# Patient Record
Sex: FEMALE | Race: WHITE | NOT HISPANIC OR LATINO | Employment: FULL TIME | URBAN - METROPOLITAN AREA
[De-identification: names, ages, dates, MRNs, and addresses within clinical notes are randomized per-mention and may not be internally consistent; named-entity substitution may affect disease eponyms.]

---

## 2019-06-10 ENCOUNTER — OFFICE VISIT (OUTPATIENT)
Dept: FAMILY MEDICINE | Facility: OTHER | Age: 49
End: 2019-06-10

## 2019-06-10 ENCOUNTER — ANCILLARY PROCEDURE (OUTPATIENT)
Dept: GENERAL RADIOLOGY | Facility: OTHER | Age: 49
End: 2019-06-10
Attending: PHYSICIAN ASSISTANT

## 2019-06-10 ENCOUNTER — TELEPHONE (OUTPATIENT)
Dept: FAMILY MEDICINE | Facility: OTHER | Age: 49
End: 2019-06-10

## 2019-06-10 VITALS
OXYGEN SATURATION: 97 % | TEMPERATURE: 98.3 F | SYSTOLIC BLOOD PRESSURE: 126 MMHG | RESPIRATION RATE: 16 BRPM | HEART RATE: 90 BPM | DIASTOLIC BLOOD PRESSURE: 88 MMHG | WEIGHT: 221 LBS | BODY MASS INDEX: 36.38 KG/M2

## 2019-06-10 DIAGNOSIS — Z90.710 S/P LAPAROSCOPIC HYSTERECTOMY: ICD-10-CM

## 2019-06-10 DIAGNOSIS — M54.50 ACUTE BILATERAL LOW BACK PAIN WITHOUT SCIATICA: ICD-10-CM

## 2019-06-10 DIAGNOSIS — K92.1 TARRY STOOL: ICD-10-CM

## 2019-06-10 DIAGNOSIS — M79.661 PAIN OF RIGHT LOWER LEG: Primary | ICD-10-CM

## 2019-06-10 DIAGNOSIS — R35.0 URINARY FREQUENCY: ICD-10-CM

## 2019-06-10 DIAGNOSIS — E66.01 MORBID OBESITY (H): ICD-10-CM

## 2019-06-10 DIAGNOSIS — R21 FACIAL RASH: ICD-10-CM

## 2019-06-10 DIAGNOSIS — R10.9 ABDOMINAL CRAMPING: ICD-10-CM

## 2019-06-10 DIAGNOSIS — N18.30 CKD (CHRONIC KIDNEY DISEASE) STAGE 3, GFR 30-59 ML/MIN (H): ICD-10-CM

## 2019-06-10 LAB
ALBUMIN UR-MCNC: NEGATIVE MG/DL
APPEARANCE UR: CLEAR
BASOPHILS # BLD AUTO: 0 10E9/L (ref 0–0.2)
BASOPHILS NFR BLD AUTO: 0.3 %
BILIRUB UR QL STRIP: NEGATIVE
COLOR UR AUTO: YELLOW
DIFFERENTIAL METHOD BLD: NORMAL
EOSINOPHIL # BLD AUTO: 0.2 10E9/L (ref 0–0.7)
EOSINOPHIL NFR BLD AUTO: 1.7 %
ERYTHROCYTE [DISTWIDTH] IN BLOOD BY AUTOMATED COUNT: 14.4 % (ref 10–15)
GLUCOSE UR STRIP-MCNC: NEGATIVE MG/DL
HCT VFR BLD AUTO: 40.7 % (ref 35–47)
HEMOCCULT STL QL: NORMAL
HGB BLD-MCNC: 13.1 G/DL (ref 11.7–15.7)
HGB UR QL STRIP: ABNORMAL
KETONES UR STRIP-MCNC: NEGATIVE MG/DL
LEUKOCYTE ESTERASE UR QL STRIP: NEGATIVE
LYMPHOCYTES # BLD AUTO: 2.5 10E9/L (ref 0.8–5.3)
LYMPHOCYTES NFR BLD AUTO: 25.8 %
MCH RBC QN AUTO: 28.2 PG (ref 26.5–33)
MCHC RBC AUTO-ENTMCNC: 32.2 G/DL (ref 31.5–36.5)
MCV RBC AUTO: 88 FL (ref 78–100)
MONOCYTES # BLD AUTO: 0.7 10E9/L (ref 0–1.3)
MONOCYTES NFR BLD AUTO: 6.9 %
NEUTROPHILS # BLD AUTO: 6.3 10E9/L (ref 1.6–8.3)
NEUTROPHILS NFR BLD AUTO: 65.3 %
NITRATE UR QL: NEGATIVE
PH UR STRIP: 5.5 PH (ref 5–7)
PLATELET # BLD AUTO: 415 10E9/L (ref 150–450)
RBC # BLD AUTO: 4.64 10E12/L (ref 3.8–5.2)
SOURCE: ABNORMAL
SP GR UR STRIP: 1.01 (ref 1–1.03)
UROBILINOGEN UR STRIP-ACNC: 0.2 EU/DL (ref 0.2–1)
WBC # BLD AUTO: 9.6 10E9/L (ref 4–11)

## 2019-06-10 PROCEDURE — 82607 VITAMIN B-12: CPT | Performed by: PHYSICIAN ASSISTANT

## 2019-06-10 PROCEDURE — 36415 COLL VENOUS BLD VENIPUNCTURE: CPT | Performed by: PHYSICIAN ASSISTANT

## 2019-06-10 PROCEDURE — 82728 ASSAY OF FERRITIN: CPT | Performed by: PHYSICIAN ASSISTANT

## 2019-06-10 PROCEDURE — 83550 IRON BINDING TEST: CPT | Performed by: PHYSICIAN ASSISTANT

## 2019-06-10 PROCEDURE — 83540 ASSAY OF IRON: CPT | Performed by: PHYSICIAN ASSISTANT

## 2019-06-10 PROCEDURE — 84443 ASSAY THYROID STIM HORMONE: CPT | Performed by: PHYSICIAN ASSISTANT

## 2019-06-10 PROCEDURE — 82272 OCCULT BLD FECES 1-3 TESTS: CPT | Performed by: PHYSICIAN ASSISTANT

## 2019-06-10 PROCEDURE — 74019 RADEX ABDOMEN 2 VIEWS: CPT

## 2019-06-10 PROCEDURE — 99214 OFFICE O/P EST MOD 30 MIN: CPT | Performed by: PHYSICIAN ASSISTANT

## 2019-06-10 PROCEDURE — 80053 COMPREHEN METABOLIC PANEL: CPT | Performed by: PHYSICIAN ASSISTANT

## 2019-06-10 PROCEDURE — 82746 ASSAY OF FOLIC ACID SERUM: CPT | Performed by: PHYSICIAN ASSISTANT

## 2019-06-10 PROCEDURE — 85025 COMPLETE CBC W/AUTO DIFF WBC: CPT | Performed by: PHYSICIAN ASSISTANT

## 2019-06-10 PROCEDURE — 81001 URINALYSIS AUTO W/SCOPE: CPT | Performed by: PHYSICIAN ASSISTANT

## 2019-06-10 PROCEDURE — 72100 X-RAY EXAM L-S SPINE 2/3 VWS: CPT

## 2019-06-10 PROCEDURE — 83735 ASSAY OF MAGNESIUM: CPT | Performed by: PHYSICIAN ASSISTANT

## 2019-06-10 RX ORDER — OMEPRAZOLE 10 MG/1
40 CAPSULE, DELAYED RELEASE ORAL EVERY MORNING
COMMUNITY
End: 2022-12-21

## 2019-06-10 ASSESSMENT — ENCOUNTER SYMPTOMS
DYSURIA: 0
NUMBNESS: 0
MYALGIAS: 1
HEMATOCHEZIA: 1
CHEST TIGHTNESS: 0
FATIGUE: 0
FLANK PAIN: 0
DIFFICULTY URINATING: 0
EYE REDNESS: 0
ABDOMINAL PAIN: 1
COUGH: 0
FREQUENCY: 1
ABDOMINAL DISTENTION: 1
SHORTNESS OF BREATH: 0
CONSTIPATION: 0
PALPITATIONS: 0
BACK PAIN: 1
RECTAL PAIN: 0
DIARRHEA: 0
HEMATURIA: 0
WHEEZING: 0
BRUISES/BLEEDS EASILY: 0
EYE DISCHARGE: 0
DIAPHORESIS: 0
NAUSEA: 0
PARESTHESIAS: 0
FEVER: 0
CHILLS: 0
SORE THROAT: 0
VOMITING: 0
JOINT SWELLING: 0
EYE PAIN: 0
SINUS PAIN: 0
ADENOPATHY: 0
HEADACHES: 1
HEARTBURN: 1

## 2019-06-10 NOTE — TELEPHONE ENCOUNTER
"Please triage appt.  Next 5 appointments (look out 90 days)    Will 10, 2019  6:30 PM CDT  Office Visit with Sulelen Millard PA-C  [facility] [address] [place] [id number] [phone]        \"burning stinging in upper part of rt leg occasionally in left leg - hurts to touch \"  "

## 2019-06-10 NOTE — PROGRESS NOTES
Subjective     Sailaja Mcgee is a 49 year old female who presents to clinic today for the following health issues:    HPI   Rash  Onset: started the same time as leg pain/abdominal pain    Description:   Location: face  Character: round, blotchy, raised, flakey, painful a little bit   Itching (Pruritis): no     Progression of Symptoms:  worsening and intermittent    Accompanying Signs & Symptoms:  Fever: no   Chills: yes  Body aches or joint pain: YES- right leg, mainly right thigh, when standing pelvic pain feels like its ripping.   Sore throat symptoms: YES- a little bit with a lump on the back of her tongue that started a little while ago(today/this afternoon)  Recent cold symptoms: no     History:   Previous similar rash: no     Precipitating factors:   Exposure to similar rash: no   New exposures: None   Recent travel: no     Alleviating factors:  none    Therapies Tried and outcome: noxema to wash, no chemicals Neutrogena(all natural)-nothing seems to help    Right leg pain:  2 months ago with injury pain started 1.5 months ago.   Washing machine fell onto her leg. She was lifting on bottom end, top part fell off and landed on her right leg.  Turned black and blue and eventually went away. Pain was not present until after the bruising went away.   Tender to touch  When standing at work can feel like it is burning (cold burning sensation), if standing too long will go across the groin and into the left leg. Initially only pain with standing but now getting more symptoms with sitting.   Pain getting worse.   Sometimes has some pain into the whole lower back pain.    Has recently had increased urinary frequency.  Sometimes has some small amount of leakage without knowing.  Started around the same time. Didn't have problems prior to the leg pain.  Has urinary frequency in past.    Had tarry stools for about a month, some red in it says she has hemorrhoids.  The last 2 bowel movements have been more normal and  "soft. Has a history of acid reflux but well treated if she takes her daily PPI medication, only rarely has recurrence of symptoms.  Denies tobacco use ( notes a short period of use 30 years ago but nothing regularly).   She'll have stomach contraction, couple times per day, has been going on for about the same amount of time as her other symptoms. Feels like \"air bubbles popping inside\".     From triage note 06/10:  She has multiple concerns that she believes are all related to some leg pain.   Everything started around the same time, about two months ago.   1. She has a pain in the front of her right thigh that is worst with standing. It either feels like it's on fire or it's numb and cold. There is no discoloration of the skin. It feels this way with pressure. Sitting makes it better, but it can still be present. Occasionally it will radiate to her groin and into her other leg and back.   2. She has generalized stomach concerns that can happen at the same time as the leg pain. \"It feels like a contraction\". There is no real \"pain\".   3. Her last two BM's have been \"closer to normal\", but prior to that her BM's have been \"like tar, but not black\". She has noticed blood in her stools, but has a history of hemorrhoids.   4. Her face has broken out in a \"rash\". It's red \"like acne, but not acne\".   Suellen was informed about patients concerns. She will see patient at 6 pm. Patient was advised that an extensive work up will be done and she can be given number for financial counseling to help.     Patient Active Problem List   Diagnosis     GERD (gastroesophageal reflux disease)     CARDIOVASCULAR SCREENING; LDL GOAL LESS THAN 160     Dysmenorrhea     Menorrhagia     Donor of kidney for transplant     Anemia     S/P laparoscopic hysterectomy     Obesity (BMI 35.0-39.9) with comorbidity (H)     Past Surgical History:   Procedure Laterality Date     C LAP,DONOR KIDNEY REMOV,LIVING  4/2/2009    Left laparoscopic " donor nephrectomy.  Donating to brother. [facility].     CYSTOSCOPY  10/24/2011    Procedure:CYSTOSCOPY; Surgeon:NAYELY MADRIGAL; Location:[facility] OR     HC BRAIN MAPPING, 1ST HOUR MD ATTENDANCE  2001    ablation of seizure focus     HC REDUCTION OF LARGE BREAST  2001     HYSTERECTOMY VAGINAL  10/24/2011    Procedure:HYSTERECTOMY VAGINAL; vaginal hysterectomy and cystoscopy; Surgeon:NAYELY MADRIGAL; Location:[facility] OR     HYSTEROSCOPY,ABLATION ENDOMETRIUM  2001       Social History     Tobacco Use     Smoking status: Never Smoker     Smokeless tobacco: Never Used     Tobacco comment: no smokers in the household   Substance Use Topics     Alcohol use: No     Family History   Problem Relation Age of Onset     Cancer Paternal Grandfather         colon cancer     Diabetes Maternal Grandmother      Cerebrovascular Disease Maternal Grandmother      Arthritis Maternal Grandmother      Arthritis Mother      Cardiovascular Maternal Grandfather         heart attack x2     Heart Disease Maternal Grandfather         heart attack x2     Gastrointestinal Disease Paternal Grandmother         liver failure     Genitourinary Problems Brother         kidney failure     Cancer Father         lung cancer diagnosed 7/11 due to chemical exposure war     Cancer Maternal Aunt      Asthma No family hx of      C.A.D. No family hx of      Hypertension No family hx of      Breast Cancer No family hx of      Cancer - colorectal No family hx of      Prostate Cancer No family hx of      Alzheimer Disease No family hx of      Blood Disease No family hx of      Circulatory No family hx of      Eye Disorder No family hx of      Lipids No family hx of      Musculoskeletal Disorder No family hx of      Neurologic Disorder No family hx of      Respiratory No family hx of      Thyroid Disease No family hx of          Current Outpatient Medications   Medication Sig Dispense Refill     omeprazole (PRILOSEC) 10 MG DR capsule Take 20 mg by mouth 2 times daily        Allergies   Allergen Reactions     Aspirin Rash     Rash on face     BP Readings from Last 3 Encounters:   06/10/19 126/88   04/12/16 104/80   04/21/15 126/76    Wt Readings from Last 3 Encounters:   06/10/19 100.2 kg (221 lb)   04/12/16 95.9 kg (211 lb 6.4 oz)   04/21/15 98.9 kg (218 lb)           Reviewed and updated as needed this visit by Provider  Tobacco  Allergies  Meds  Problems  Med Hx  Surg Hx  Fam Hx         Review of Systems   Constitutional: Negative for chills, diaphoresis, fatigue and fever.   HENT: Negative for congestion, ear pain, sinus pain and sore throat.    Eyes: Negative for pain, discharge, redness and visual disturbance.   Respiratory: Negative for cough, chest tightness, shortness of breath and wheezing.    Cardiovascular: Negative for chest pain, palpitations and peripheral edema.   Gastrointestinal: Positive for abdominal distention, abdominal pain, heartburn and hematochezia. Negative for constipation, diarrhea, nausea, rectal pain and vomiting.   Endocrine: Negative for cold intolerance and heat intolerance.   Genitourinary: Positive for frequency and urgency. Negative for decreased urine volume, difficulty urinating, dysuria, flank pain, hematuria, pelvic pain, vaginal bleeding, vaginal discharge and vaginal pain.   Musculoskeletal: Positive for back pain and myalgias. Negative for gait problem and joint swelling.   Skin: Positive for rash.   Neurological: Positive for headaches. Negative for syncope, numbness and paresthesias.   Hematological: Negative for adenopathy. Does not bruise/bleed easily.          Objective    /88   Pulse 90   Temp 98.3  F (36.8  C) (Temporal)   Resp 16   Wt 100.2 kg (221 lb)   LMP 09/19/2011   SpO2 97%   BMI 36.38 kg/m    Body mass index is 36.38 kg/m .  Physical Exam   GENERAL: healthy, alert and no distress  EYES: Eyes grossly normal to inspection, PERRL and conjunctivae and sclerae normal  HENT: ear canals and TM's normal, nose and  mouth without ulcers or lesions  NECK: no adenopathy, no asymmetry, masses, or scars and thyroid normal to palpation  RESP: lungs clear to auscultation - no rales, rhonchi or wheezes  CV: regular rate and rhythm, normal S1 S2, no S3 or S4, no murmur, click or rub, no peripheral edema and peripheral pulses strong  ABDOMEN: soft, mild distension noted of abdomen and noted abdominal muscle contraction RLQ region, mildly tender to palpation over area of contraction, no hepatosplenomegaly, no masses and bowel sounds normal  MS: no gross musculoskeletal defects noted, no edema, full active ROM of bilateral hips, lumbar spine, knees.  Tenderness to palpation along the right lumbar paraspinal region, right SI joint, mildly tender over the left SI joint, non-tender over the lumbar spinous processes.  Tenderness to light touch over the anterior right thigh, mild tenderness over the lateral and medial right thigh, non-tender over the posterior right thigh. Non-tender to palpation of the entire left thigh.  Non-tender to palpation over right knee and right calf muscles.  5/5 lower extremity strength.   SKIN: face mild erythema over cheeks with scattered papules, no contusions or erythema of the skin.   NEURO: Normal strength and tone, mentation intact and speech normal, gait normal, CN II-XII grossly intact.   PSYCH: mentation appears normal, affect normal/bright    Diagnostic Test Results:  Results for orders placed or performed in visit on 06/10/19 (from the past 24 hour(s))   CBC with platelets differential   Result Value Ref Range    WBC 9.6 4.0 - 11.0 10e9/L    RBC Count 4.64 3.8 - 5.2 10e12/L    Hemoglobin 13.1 11.7 - 15.7 g/dL    Hematocrit 40.7 35.0 - 47.0 %    MCV 88 78 - 100 fl    MCH 28.2 26.5 - 33.0 pg    MCHC 32.2 31.5 - 36.5 g/dL    RDW 14.4 10.0 - 15.0 %    Platelet Count 415 150 - 450 10e9/L    % Neutrophils 65.3 %    % Lymphocytes 25.8 %    % Monocytes 6.9 %    % Eosinophils 1.7 %    % Basophils 0.3 %     Absolute Neutrophil 6.3 1.6 - 8.3 10e9/L    Absolute Lymphocytes 2.5 0.8 - 5.3 10e9/L    Absolute Monocytes 0.7 0.0 - 1.3 10e9/L    Absolute Eosinophils 0.2 0.0 - 0.7 10e9/L    Absolute Basophils 0.0 0.0 - 0.2 10e9/L    Diff Method Automated Method    Comprehensive metabolic panel   Result Value Ref Range    Sodium 140 133 - 144 mmol/L    Potassium 3.7 3.4 - 5.3 mmol/L    Chloride 108 94 - 109 mmol/L    Carbon Dioxide 26 20 - 32 mmol/L    Anion Gap 6 3 - 14 mmol/L    Glucose 89 70 - 99 mg/dL    Urea Nitrogen 13 7 - 30 mg/dL    Creatinine 1.17 (H) 0.52 - 1.04 mg/dL    GFR Estimate 55 (L) >60 mL/min/[1.73_m2]    GFR Estimate If Black 64 >60 mL/min/[1.73_m2]    Calcium 8.5 8.5 - 10.1 mg/dL    Bilirubin Total 0.2 0.2 - 1.3 mg/dL    Albumin 3.3 (L) 3.4 - 5.0 g/dL    Protein Total 7.8 6.8 - 8.8 g/dL    Alkaline Phosphatase 94 40 - 150 U/L    ALT 17 0 - 50 U/L    AST 12 0 - 45 U/L   Iron and iron binding capacity   Result Value Ref Range    Iron 31 (L) 35 - 180 ug/dL    Iron Binding Cap 272 240 - 430 ug/dL    Iron Saturation Index 11 (L) 15 - 46 %   Magnesium   Result Value Ref Range    Magnesium 2.2 1.6 - 2.3 mg/dL   Ferritin   Result Value Ref Range    Ferritin 119 8 - 252 ng/mL   TSH with free T4 reflex   Result Value Ref Range    TSH 2.98 0.40 - 4.00 mU/L   Occult blood stool   Result Value Ref Range    Occult Blood Canceled, Test credited NEG^Negative   *UA reflex to Microscopic   Result Value Ref Range    Color Urine Yellow     Appearance Urine Clear     Glucose Urine Negative NEG^Negative mg/dL    Bilirubin Urine Negative NEG^Negative    Ketones Urine Negative NEG^Negative mg/dL    Specific Gravity Urine 1.015 1.003 - 1.035    Blood Urine Trace (A) NEG^Negative    pH Urine 5.5 5.0 - 7.0 pH    Protein Albumin Urine Negative NEG^Negative mg/dL    Urobilinogen Urine 0.2 0.2 - 1.0 EU/dL    Nitrite Urine Negative NEG^Negative    Leukocyte Esterase Urine Negative NEG^Negative    Source Unspecified Urine    Urine  Microscopic   Result Value Ref Range    WBC Urine 0 - 5 OTO5^0 - 5 /HPF    RBC Urine O - 2 OTO2^O - 2 /HPF    Squamous Epithelial /LPF Urine Few FEW^Few /LPF    Bacteria Urine Few (A) NEG^Negative /HPF     LUMBAR SPINE TWO-THREE  VIEWS  6/10/2019 6:49 PM      HISTORY: Acute bilateral low back pain without sciatica     COMPARISON: None.     FINDINGS: There is normal osseous alignment.  No fractures are  identified.  There is loss of disc space height at L5-S1. No  spondylolysis or spondylolisthesis is identified.                                                                      IMPRESSION: Degenerative disc disease at L5-S1.     PEDRO ORANTES MD      ABDOMEN TWO VIEWS 6/10/2019 6:50 PM      HISTORY: Urinary frequency; Abdominal cramping; Tarry stool     COMPARISON: None.                                                                      IMPRESSION: Surgical clips project in the medial left upper and mid  abdomen. Prominent stool in the ascending and proximal transverse  colon. Nonobstructive bowel gas pattern. No evidence for  pneumoperitoneum.     CHICHO RAHMAN MD        RIGHT LEG VENOUS ULTRASOUND 6/11/2019 11:34 AM     HISTORY:  Crushing type injury two months ago, hematoma developed,  persistent pain of right lower leg.     TECHNIQUE: Venous Doppler ultrasound with color flow and spectral  Doppler with waveform analysis performed. Compression and augmentation  performed.     COMPARISON:  None.     FINDINGS: There is no evidence for deep vein thrombus in the right  common femoral, superficial femoral, popliteal, or visualized portions  of posterior tibial veins. No greater saphenous vein thrombus. No  sonographic abnormality along the mid lateral thigh, negative for  fluid collection.                                                                      IMPRESSION:  Negative for deep vein thrombus right leg.     CHICHO RAHMAN MD    Assessment & Plan       ICD-10-CM    1. Pain of right lower leg M79.661 CBC  with platelets differential     Comprehensive metabolic panel     Iron and iron binding capacity     Magnesium     Ferritin     Folate     Vitamin B12     US Lower Extremity Venous Duplex Right     TSH with free T4 reflex   2. Acute bilateral low back pain without sciatica M54.5 CBC with platelets differential     Comprehensive metabolic panel     Iron and iron binding capacity     Magnesium     Ferritin     Folate     Vitamin B12     XR Lumbar Spine 2/3 Views     TSH with free T4 reflex   3. Abdominal cramping R10.9 CBC with platelets differential     Occult blood stool     XR Abdomen 2 Views     Comprehensive metabolic panel     Iron and iron binding capacity     Magnesium     Ferritin     Folate     Vitamin B12     TSH with free T4 reflex   4. Tarry stool K92.1 CBC with platelets differential     Occult blood stool     XR Abdomen 2 Views     Comprehensive metabolic panel     Iron and iron binding capacity     Magnesium     Ferritin     Folate     Vitamin B12   5. Urinary frequency R35.0 CBC with platelets differential     XR Abdomen 2 Views     Comprehensive metabolic panel     *UA reflex to Microscopic     Urine Microscopic   6. Facial rash R21 Comprehensive metabolic panel     TSH with free T4 reflex   7. Morbid obesity (H) E66.01    8. S/P laparoscopic hysterectomy Z90.710         Uncertain if her symptoms are related or not.  Differential diagnosis includes but not limited to hematoma of right leg, deep venous thrombosis, bilateral back pain with radiculopathy, cauda equina, UTI, upper or lower GI bleed, GERD, gastritis, abdominal perforation, constipation, hemorrhoids, ulcer disease, colon cancer, rosacea, acne, lupus malar rash, anemia.      In regards to the leg feel it is important to obtain x-ray of the lumbar spine, evaluate for degenerative disc disease, Recommended ultrasound to rule out deep venous thrombosis and any hematoma collection, no concerns about arterial blockage due to normal pulses and  coloration of the lower leg.  Consider MRI lumbar spine as this could be neuropathic pain causing symptoms but due to trauma ruling out local concerns to the thigh.     Because of patient noting dark tarry stools which are now more normal recommended obtain fecal occult lab, anemia work-up.  She is to stay on acid reducing medication. Obtained x-ray to make sure no air fluid levels or free air or signs of overt constipation.  Uncertain the cause of the abdominal cramping witnessed on exam, obtained metabolic panel.  Consider endoscopy/colonoscopy to further evaluate.     Urinary frequency low suspicion for cauda equina as she is not overtly losing control but has some leakage, could be constipation related with the abdomen.  Ruled out infection with UA.  Consider MRI to rule out back cause.     The rash on face suspicious for rosacea,  notes worse with sunlight, may be developing due to stress with these other symptoms and some acne type treatments are helping.      06/11/19: Her lab work thus far shows no immediate concerns that would explain all her symptoms.  Her hemoglobin is normal but her iron levels are low. Her creatinine is slightly elevated but has been in past and is not much changed from the past.  Her urine is negative for infection - trace blood noted on the dip but microscopic RBC are 0-2 so low concern. At this time would recommend going forward with MRI of the lumbar spine to see if this is the cause of the symptoms as the ultrasound showed no deep venous thrombosis or signs of fluid collection and her pain does not appear to be with the hip itself as she has more tenderness to touch not much change with movement. Recommend starting Miralax to try and clear out the bowels as stool was noted in colon.  Consider referral to GI and/or endoscopy to further assess if GI symptoms persist.  Recommend avoidance of any medications which could hurt kidneys and recommend starting iron supplement.  She  still needs to complete fecal occult to determine if blood is in stool. Her folate and vitamin B12 results were normal.     Advised ED if severe symptoms develop.     Return in about 1 week (around 6/17/2019) for If not improving, sooner if worse or new concerns.     Options for treatment and follow-up care were reviewed with the patient and/or guardian. Patient and/or guardian engaged in the decision making process and verbalized understanding of the options discussed and agreed with the final plan.     Suellen Millard PA-C  [facility]

## 2019-06-10 NOTE — TELEPHONE ENCOUNTER
"I spoke with patient.   She has multiple concerns that she believes are all related to some leg pain.   Everything started around the same time, about two months ago.   1. She has a pain in the front of her right thigh that is worst with standing. It either feels like it's on fire or it's numb and cold. There is no discoloration of the skin. It feels this way with pressure. Sitting makes it better, but it can still be present. Occasionally it will radiate to her groin and into her other leg and back.   2. She has generalized stomach concerns that can happen at the same time as the leg pain. \"It feels like a contraction\". There is no real \"pain\".   3. Her last two BM's have been \"closer to normal\", but prior to that her BM's have been \"like tar, but not black\". She has noticed blood in her stools, but has a history of hemorrhoids.   4. Her face has broken out in a \"rash\". It's red \"like acne, but not acne\".   Suellen was informed about patients concerns. She will see patient at 6 pm. Patient was advised that an extensive work up will be done and she can be given number for financial counseling to help.     Next 5 appointments (look out 90 days)    Will 10, 2019  6:30 PM CDT  Office Visit with Suellen Millard PA-C  [facility] [address]  [facility] [id number] [phone]        Thelma Darnell, RN, BSN    "

## 2019-06-11 ENCOUNTER — ANCILLARY PROCEDURE (OUTPATIENT)
Dept: ULTRASOUND IMAGING | Facility: OTHER | Age: 49
End: 2019-06-11
Attending: PHYSICIAN ASSISTANT

## 2019-06-11 DIAGNOSIS — M79.661 PAIN OF RIGHT LOWER LEG: ICD-10-CM

## 2019-06-11 LAB
ALBUMIN SERPL-MCNC: 3.3 G/DL (ref 3.4–5)
ALP SERPL-CCNC: 94 U/L (ref 40–150)
ALT SERPL W P-5'-P-CCNC: 17 U/L (ref 0–50)
ANION GAP SERPL CALCULATED.3IONS-SCNC: 6 MMOL/L (ref 3–14)
AST SERPL W P-5'-P-CCNC: 12 U/L (ref 0–45)
BACTERIA #/AREA URNS HPF: ABNORMAL /HPF
BILIRUB SERPL-MCNC: 0.2 MG/DL (ref 0.2–1.3)
BUN SERPL-MCNC: 13 MG/DL (ref 7–30)
CALCIUM SERPL-MCNC: 8.5 MG/DL (ref 8.5–10.1)
CHLORIDE SERPL-SCNC: 108 MMOL/L (ref 94–109)
CO2 SERPL-SCNC: 26 MMOL/L (ref 20–32)
CREAT SERPL-MCNC: 1.17 MG/DL (ref 0.52–1.04)
FERRITIN SERPL-MCNC: 119 NG/ML (ref 8–252)
FOLATE SERPL-MCNC: 9.8 NG/ML
GFR SERPL CREATININE-BSD FRML MDRD: 55 ML/MIN/{1.73_M2}
GLUCOSE SERPL-MCNC: 89 MG/DL (ref 70–99)
IRON SATN MFR SERPL: 11 % (ref 15–46)
IRON SERPL-MCNC: 31 UG/DL (ref 35–180)
MAGNESIUM SERPL-MCNC: 2.2 MG/DL (ref 1.6–2.3)
NON-SQ EPI CELLS #/AREA URNS LPF: ABNORMAL /LPF
POTASSIUM SERPL-SCNC: 3.7 MMOL/L (ref 3.4–5.3)
PROT SERPL-MCNC: 7.8 G/DL (ref 6.8–8.8)
RBC #/AREA URNS AUTO: ABNORMAL /HPF
SODIUM SERPL-SCNC: 140 MMOL/L (ref 133–144)
TIBC SERPL-MCNC: 272 UG/DL (ref 240–430)
TSH SERPL DL<=0.005 MIU/L-ACNC: 2.98 MU/L (ref 0.4–4)
VIT B12 SERPL-MCNC: 474 PG/ML (ref 193–986)
WBC #/AREA URNS AUTO: ABNORMAL /HPF

## 2019-06-11 PROCEDURE — 93971 EXTREMITY STUDY: CPT | Mod: RT

## 2019-06-17 ENCOUNTER — TELEPHONE (OUTPATIENT)
Dept: FAMILY MEDICINE | Facility: OTHER | Age: 49
End: 2019-06-17

## 2019-06-17 NOTE — TELEPHONE ENCOUNTER
Spoke with patient.   Symptoms are not improving.   Waiting to figure out what they used for her Brain surgery so waiting to hear back to schedule MRI if able.   She needs to come in to do lab for occult stool, if positive consider endoscopy versus CT Abdomen/Pelvis.   Uncertain if there is possibly some abdominal mass that could be causing her symptoms.  She will keep us up to date and will determine next step based on results.     Suellen Millard PA-C

## 2019-06-20 ENCOUNTER — HOSPITAL ENCOUNTER (OUTPATIENT)
Dept: MRI IMAGING | Facility: CLINIC | Age: 49
Discharge: HOME | End: 2019-06-20
Attending: PHYSICIAN ASSISTANT | Admitting: PHYSICIAN ASSISTANT

## 2019-06-20 ENCOUNTER — TELEPHONE (OUTPATIENT)
Dept: FAMILY MEDICINE | Facility: OTHER | Age: 49
End: 2019-06-20

## 2019-06-20 DIAGNOSIS — R19.00 PELVIC MASS: Primary | ICD-10-CM

## 2019-06-20 PROCEDURE — 72148 MRI LUMBAR SPINE W/O DYE: CPT

## 2019-06-20 NOTE — TELEPHONE ENCOUNTER
Spoke with patient regarding results. Will get CT Abdomen/Pelvis scheduled for patient Monday and contact with time of imaging.     Suellen Millard PA-C

## 2019-06-20 NOTE — TELEPHONE ENCOUNTER
Received call from radiology     MRI lumbar spine   - Diffuse degenerative disc disease   - 17 cm cystic mass in peritoneum     Recommend MRI abdomen or CT ABD/Pelvis for further evalation     Results relayed to patient's ordering provider, SIOMARA Brito PA-C  [facility]

## 2019-06-21 ENCOUNTER — TELEPHONE (OUTPATIENT)
Dept: FAMILY MEDICINE | Facility: OTHER | Age: 49
End: 2019-06-21

## 2019-06-21 ENCOUNTER — HOSPITAL ENCOUNTER (OUTPATIENT)
Dept: CT IMAGING | Facility: CLINIC | Age: 49
Discharge: HOME | End: 2019-06-21
Attending: PHYSICIAN ASSISTANT | Admitting: PHYSICIAN ASSISTANT

## 2019-06-21 DIAGNOSIS — R19.00 PELVIC MASS: Primary | ICD-10-CM

## 2019-06-21 DIAGNOSIS — R19.00 PELVIC MASS: ICD-10-CM

## 2019-06-21 PROCEDURE — 25000128 H RX IP 250 OP 636: Performed by: PHYSICIAN ASSISTANT

## 2019-06-21 PROCEDURE — 25000125 ZZHC RX 250: Performed by: PHYSICIAN ASSISTANT

## 2019-06-21 PROCEDURE — 74177 CT ABD & PELVIS W/CONTRAST: CPT

## 2019-06-21 RX ORDER — IOPAMIDOL 755 MG/ML
500 INJECTION, SOLUTION INTRAVASCULAR ONCE
Status: COMPLETED | OUTPATIENT
Start: 2019-06-21 | End: 2019-06-21

## 2019-06-21 RX ADMIN — SODIUM CHLORIDE 60 ML: 9 INJECTION, SOLUTION INTRAVENOUS at 13:38

## 2019-06-21 RX ADMIN — IOPAMIDOL 100 ML: 755 INJECTION, SOLUTION INTRAVENOUS at 13:38

## 2019-06-21 NOTE — TELEPHONE ENCOUNTER
Called patient and she can go today to get this done.  Scheduled her at [place] for 12:30, NPO 2 hours prior.     Patient placed on 3L O2 via [place].

## 2019-06-25 ENCOUNTER — ONCOLOGY VISIT (OUTPATIENT)
Dept: ONCOLOGY | Facility: CLINIC | Age: 49
End: 2019-06-25
Attending: PHYSICIAN ASSISTANT

## 2019-06-25 VITALS
BODY MASS INDEX: 35.27 KG/M2 | TEMPERATURE: 98.2 F | WEIGHT: 219.5 LBS | OXYGEN SATURATION: 99 % | HEIGHT: 66 IN | HEART RATE: 105 BPM | DIASTOLIC BLOOD PRESSURE: 91 MMHG | SYSTOLIC BLOOD PRESSURE: 135 MMHG

## 2019-06-25 DIAGNOSIS — R19.03 RIGHT LOWER QUADRANT ABDOMINAL MASS: ICD-10-CM

## 2019-06-25 LAB
CANCER AG125 SERPL-ACNC: 282 U/ML (ref 0–30)
CEA SERPL-MCNC: 6.9 UG/L (ref 0–2.5)

## 2019-06-25 PROCEDURE — 99204 OFFICE O/P NEW MOD 45 MIN: CPT | Performed by: INTERNAL MEDICINE

## 2019-06-25 PROCEDURE — 36415 COLL VENOUS BLD VENIPUNCTURE: CPT | Performed by: INTERNAL MEDICINE

## 2019-06-25 PROCEDURE — 86304 IMMUNOASSAY TUMOR CA 125: CPT | Performed by: INTERNAL MEDICINE

## 2019-06-25 PROCEDURE — 82378 CARCINOEMBRYONIC ANTIGEN: CPT | Performed by: INTERNAL MEDICINE

## 2019-06-25 RX ORDER — MULTIPLE VITAMINS W/ MINERALS TAB 9MG-400MCG
1 TAB ORAL EVERY MORNING
COMMUNITY
End: 2019-08-06

## 2019-06-25 ASSESSMENT — MIFFLIN-ST. JEOR: SCORE: 1634.46

## 2019-06-25 ASSESSMENT — PAIN SCALES - GENERAL: PAINLEVEL: WORST PAIN (10)

## 2019-06-25 NOTE — LETTER
"    6/25/2019         RE: Sailaja Mcgee  [address] [facility] [place] [address]        Dear Colleague,    Thank you for referring your patient, Sailaja Mcgee, to the [facility]. Please see a copy of my visit note below.    Oncology Rooming Note    June 25, 2019 2:53 PM   Sailaja Mcgee is a 49 year old female who presents for:    Chief Complaint   Patient presents with     Consult     Pelvic Mass Ref: Suellen Millard     Initial Vitals: BP (!) 135/91 (BP Location: Right arm, Patient Position: Sitting, Cuff Size: Adult Regular)   Pulse 105   Temp 98.2  F (36.8  C) (Temporal)   Ht 1.664 m (5' 5.5\")   Wt 99.6 kg (219 lb 8 oz)   LMP 09/19/2011   SpO2 99%   BMI 35.97 kg/m    Estimated body mass index is 35.97 kg/m  as calculated from the following:    Height as of this encounter: 1.664 m (5' 5.5\").    Weight as of this encounter: 99.6 kg (219 lb 8 oz). Body surface area is 2.15 meters squared.  Worst Pain (10) Comment: Data Unavailable   Patient's last menstrual period was 09/19/2011.  Allergies reviewed: Yes  Medications reviewed: Yes    Medications: Medication refills not needed today.  Pharmacy name entered into [employer]: [facility] [address] [place] [address] [id number] [place] [address]    Clinical concerns: none Dr. Recinos was notified.      Nikia Weber              DATE OF VISIT: Jun 25, 2019    REASON FOR REFERRAL: Pelvic mass    CHIEF COMPLAINT:   Chief Complaint   Patient presents with     Consult     Pelvic Mass Ref: Suellen Millard       HISTORY OF PRESENT ILLNESS:   Sailaja Mcgee 49-year-old female patient who presented with right leg pain mostly in the right thigh for more than 1 month.  Feels like burning sensation that is tender to touch.  Patient also has been complaining of abdominal distention.  Subsequently she went on to have MRI of the lumbar spine on June 20, 2019.  It showed L5-S1 severe degenerative disc disease without neural impingement.  There is a very large multiloculated cystic lesion occupying " most of the pelvic peritoneum.  A CT of the abdomen was done showed large multiseptated cystic mass with thickened septae in the abdomen and pelvis measuring 27 x 15 x 19 concerning for malignancy.  There is moderate amount of ascites mild stranding in the peritoneal adipose tissue anteriorly.  Patient has been having increasing abdominal distention and occasional nausea and poor appetite.  Here today to discuss further management.    REVIEW OF SYSTEMS:   Constitutional: Negative for fever, chills, and night sweats.  Skin: negative.  Eyes: negative.  Ears/Nose/Throat: negative.  Respiratory: No shortness of breath, dyspnea on exertion, cough, or hemoptysis.  Cardiovascular: negative.  Gastrointestinal:  Genitourinary: negative.  Musculoskeletal: negative.  Neurologic: negative.  Psychiatric: negative.  Hematologic/Lymphatic/Immunologic: negative.  Endocrine: negative.    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  Donating to brother. [facility].     CYSTOSCOPY  10/24/2011    Procedure:CYSTOSCOPY; Surgeon:NAYELY MADRIGAL; Location:[facility] OR     HC BRAIN MAPPING, 1ST HOUR MD ATTENDANCE  2001    ablation of seizure focus     HC REDUCTION OF LARGE BREAST  2001     HYSTERECTOMY VAGINAL  10/24/2011    Procedure:HYSTERECTOMY VAGINAL; vaginal hysterectomy and cystoscopy; Surgeon:NAYELY MADRIGAL; Location:[facility] OR     HYSTEROSCOPY,ABLATION ENDOMETRIUM  2001       ALLERGIES:   Allergies as of 06/25/2019 - Reviewed 06/25/2019   Allergen Reaction Noted     Aspirin Rash 10/20/2011       MEDICATIONS:   Current  Outpatient Medications   Medication Sig Dispense Refill     multivitamin w/minerals (MULTI-VITAMIN) tablet Take 1 tablet by mouth daily       omeprazole (PRILOSEC) 10 MG DR capsule Take 20 mg by mouth 2 times daily          FAMILY HISTORY:   Family History   Problem Relation Age of Onset     Cancer Paternal Grandfather         colon cancer     Diabetes Maternal Grandmother      Cerebrovascular Disease Maternal Grandmother      Arthritis Maternal Grandmother      Arthritis Mother      Cardiovascular Maternal Grandfather         heart attack x2     Heart Disease Maternal Grandfather         heart attack x2     Gastrointestinal Disease Paternal Grandmother         liver failure     Genitourinary Problems Brother         kidney failure     Cancer Father         lung cancer diagnosed 7/11 due to chemical exposure war     Cancer Maternal Aunt      Asthma No family hx of      C.A.D. No family hx of      Hypertension No family hx of      Breast Cancer No family hx of      Cancer - colorectal No family hx of      Prostate Cancer No family hx of      Alzheimer Disease No family hx of      Blood Disease No family hx of      Circulatory No family hx of      Eye Disorder No family hx of      Lipids No family hx of      Musculoskeletal Disorder No family hx of      Neurologic Disorder No family hx of      Respiratory No family hx of      Thyroid Disease No family hx of         SOCIAL HISTORY:   Social History     Socioeconomic History     Marital status:      Spouse name: None     Number of children: None     Years of education: None     Highest education level: None   Occupational History     None   Social Needs     Financial resource strain: None     Food insecurity:     Worry: None     Inability: None     Transportation needs:     Medical: None     Non-medical: None   Tobacco Use     Smoking status: Never Smoker     Smokeless tobacco: Never Used     Tobacco comment: no smokers in the household   Substance and Sexual  "Activity     Alcohol use: No     Drug use: No     Sexual activity: Yes     Partners: Male   Lifestyle     Physical activity:     Days per week: None     Minutes per session: None     Stress: None   Relationships     Social connections:     Talks on phone: None     Gets together: None     Attends Confucianism service: None     Active member of club or organization: None     Attends meetings of clubs or organizations: None     Relationship status: None     Intimate partner violence:     Fear of current or ex partner: None     Emotionally abused: None     Physically abused: None     Forced sexual activity: None   Other Topics Concern     Parent/sibling w/ CABG, MI or angioplasty before 65F 55M? Not Asked   Social History Narrative     None       PHYSICAL EXAMINATION:   BP (!) 135/91 (BP Location: Right arm, Patient Position: Sitting, Cuff Size: Adult Regular)   Pulse 105   Temp 98.2  F (36.8  C) (Temporal)   Ht 1.664 m (5' 5.5\")   Wt 99.6 kg (219 lb 8 oz)   LMP 09/19/2011   SpO2 99%   BMI 35.97 kg/m     Wt Readings from Last 10 Encounters:   06/25/19 99.6 kg (219 lb 8 oz)   06/10/19 100.2 kg (221 lb)   04/12/16 95.9 kg (211 lb 6.4 oz)   04/21/15 98.9 kg (218 lb)   02/10/14 93 kg (205 lb)   01/17/14 91.6 kg (202 lb)   01/09/14 92.5 kg (204 lb)   01/05/14 93 kg (205 lb)   11/05/11 97.1 kg (214 lb)   10/24/11 98.7 kg (217 lb 9.5 oz)      ECOG performance status: 1  GENERAL APPEARANCE: Healthy, alert and in no acute distress.  HEENT: Sclerae anicteric. PERRLA. Oropharynx without ulcers, lesions, or thrush.  NECK: Supple. No asymmetry or masses.  LYMPHATICS: No palpable cervical, supraclavicular, axillary, or inguinal lymphadenopathy.  RESP: Lungs clear to auscultation bilaterally without rales, rhonchi or wheezes.  CARDIOVASCULAR: Regular rate and rhythm. Normal S1, S2; no S3 or S4. No murmur, gallop, or rub.  ABDOMEN: Abdominal distention tenderness in the suprapubic area. Bowel sounds normal. No palpable organomegaly " or masses.  MUSCULOSKELETAL: Extremities without gross deformities noted. No edema of bilateral lower extremities.  SKIN: No suspicious lesions or rashes.  NEURO: Alert and oriented x 3. Cranial nerves II-XII grossly intact.  PSYCHIATRIC: Mentation and affect appear normal.    LABORATORY RESULTS:  Hospital Outpatient Visit on 06/12/2019   Component Date Value Ref Range Status     Radiologist flags 06/20/2019 Large abdominal and pelvic cystic mass, probably an*  Final       IMAGING RESULTS:  Recent Results (from the past 744 hour(s))   XR Lumbar Spine 2/3 Views    Narrative    LUMBAR SPINE TWO-THREE  VIEWS  6/10/2019 6:49 PM     HISTORY: Acute bilateral low back pain without sciatica    COMPARISON: None.    FINDINGS: There is normal osseous alignment.  No fractures are  identified.  There is loss of disc space height at L5-S1. No  spondylolysis or spondylolisthesis is identified.      Impression    IMPRESSION: Degenerative disc disease at L5-S1.    PEDRO ORANTES MD   XR Abdomen 2 Views    Narrative    ABDOMEN TWO VIEWS 6/10/2019 6:50 PM     HISTORY: Urinary frequency; Abdominal cramping; Tarry stool    COMPARISON: None.      Impression    IMPRESSION: Surgical clips project in the medial left upper and mid  abdomen. Prominent stool in the ascending and proximal transverse  colon. Nonobstructive bowel gas pattern. No evidence for  pneumoperitoneum.    CHICHO RAHMAN MD   US Lower Extremity Venous Duplex Right    Narrative    RIGHT LEG VENOUS ULTRASOUND 6/11/2019 11:34 AM    HISTORY:  Crushing type injury two months ago, hematoma developed,  persistent pain of right lower leg.    TECHNIQUE: Venous Doppler ultrasound with color flow and spectral  Doppler with waveform analysis performed. Compression and augmentation  performed.    COMPARISON:  None.     FINDINGS: There is no evidence for deep vein thrombus in the right  common femoral, superficial femoral, popliteal, or visualized portions  of posterior tibial veins. No  greater saphenous vein thrombus. No  sonographic abnormality along the mid lateral thigh, negative for  fluid collection.      Impression    IMPRESSION:  Negative for deep vein thrombus right leg.    CHICHO RAHMAN MD   MR Lumbar Spine w/o Contrast   Result Value    Radiologist flags (Urgent)     Large abdominal and pelvic cystic mass, probably an    Narrative    MRI LUMBAR SPINE WITHOUT CONTRAST   6/20/2019 3:12 PM     HISTORY: Bilateral back pain, leg pain, tingling and numbness.    TECHNIQUE: Multiplanar, multisequence MRI of the lumbar spine without  contrast.    COMPARISON: Radiographs 6/10/2019    FINDINGS: Radiographs show 5 lumbar-type vertebral bodies. The distal  spinal cord and cauda equina appear normal.    T12-L1: Normal disc, facet joints, spinal canal and neural foramina.      L1-L2: Normal disc, facet joints, spinal canal and neural foramina.      L2-L3: Normal disc, facet joints, spinal canal and neural foramina.      L3-L4: Normal disc, facet joints, spinal canal and neural foramina.     L4-L5: Normal disc, facet joints, spinal canal and neural foramina.      L5-S1: Severe degenerative disc disease. Posterior disc bulge does not  cause any neural impingement. Normal facet joints, spinal canal and  neural foramina.    Paraspinous soft tissues: There is a very large multiloculated cystic  lesion occupying all of the peritoneum seen on the scan, at least 17  cm transverse diameter.    Bone marrow: Normal.       Impression    IMPRESSION:    1. L5-S1 severe degenerative disc disease without neural impingement.  2. There is a very large multiloculated cystic lesion occupying most  of the pelvic peritoneum. MRI or CT scan of the abdomen and pelvis is  recommended for further evaluation.    I called the report to the clinic and talked with Tae Coppola at 4:05 PM.    [Access Center: Large abdominal and pelvic cystic mass, probably an  ovarian tumor.]    This report will be copied to the  [facility] to ensure a  provider acknowledges the finding. Access Center is available Monday  through Friday 8 am-3:30 pm.       FARA LOWERY MD   CT Abdomen Pelvis w Contrast    Narrative    CT ABDOMEN AND PELVIS WITH CONTRAST  6/21/2019 1:43 PM    HISTORY: 17 cm cystic pelvic mass found on Lumbar MRI. Pelvic mass.    TECHNIQUE: Scans obtained from the diaphragm through the pelvis with  IV contrast. Radiation dose for this scan was reduced using automated  exposure control, adjustment of the mA and/or kV according to patient  size, or iterative reconstruction technique.    COMPARISON:  MRI lumbar spine dated 6/20/2019, CT abdomen dated  7/21/2008.    FINDINGS: Calcified granuloma measuring 0.4 cm is seen in the right  lower lobe medially. Visualized portions of the lung bases are  otherwise grossly clear. There is a moderate-sized hiatal hernia  containing a portion of the stomach, some fluid, and adipose tissue.  Calcified subcarinal lymph node is noted. Visualized mediastinal  contents are otherwise grossly unremarkable. There are no aggressive  osseous lesions.      The liver, gallbladder, pancreas, spleen, bilateral adrenal glands and  right kidney enhance normally. Left kidney is surgically absent.  Patient was a renal donor per history. No hydronephrosis,  nephrolithiasis, hydroureter or ureteral calculus. Urinary bladder is  unremarkable.    There is a large mostly cystic multiseptated mass with some thickening  of septae in the lower abdomen and pelvis measuring up to  approximately 27.0 x 14.5 x 18.9 cm in the transverse, AP and  craniocaudal dimensions, respectively. This is of uncertain etiology  but could be ovarian. Patient is status post hysterectomy. Possible  collapsing cyst is seen in the right adnexa, likely in the right  ovary. The left ovary is not well seen.    There is a moderate amount of ascites. There are some stranding in the  peritoneal adipose tissue anteriorly in the  abdomen. This could  represent edema versus early omental caking.    No adenopathy or free air is seen in the peritoneal cavity. Aorta is  grossly normal in appearance.    The colon is of normal caliber without pericolonic inflammatory change  to suggest diverticulitis. Appendix is not well seen. No pericecal  inflammatory change to suggest acute appendicitis. Small bowel is  grossly of normal caliber.    IMPRESSION  1. Large multiseptated cystic mass with thickened septae in the  abdomen and pelvis measures up to 27 x 15 x 19 cm. 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 A benign serocystadenoma is considered  less likely.  2. Moderate amount of ascites could represent malignant fluid or  ascites of other etiology.  3. Mild stranding in the peritoneal adipose tissues anteriorly in the  upper abdomen could represent early omental caking but could also  represent edema of other cause.  4. Moderate-sized hiatal hernia. Left nephrectomy. Evidence of chronic  granulomatous disease of the right lung.    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  Today I will check Ca125 and CEA level.  We would make arrangements for the patient to see gynecologic oncology as treatment options.  I gave the patient overview for very malignancies, we talked about different modalities in the treatment including systemic chemotherapy.  We also talked about symptom management.    The patient is ready to learn, no apparent learning barriers were identified, Diagnosis and treatment plans were explained to the patient. The patient expressed understanding of the content. The patient questions were answered to her satisfaction.    Bert Recinos MD     Time spent 45 minutes more than 50% of the time in counseling and  coordination of care including reviewing imaging studies, discussion of differential diagnosis, management of pelvic malignancies, side effects of chemotherapy, follow-up and prognosis.    Chart documentation with Dragon Voice recognition Software. Although reviewed after completion, some words and grammatical errors may remain.      Again, thank you for allowing me to participate in the care of your patient.        Sincerely,        Bert Recinos MD

## 2019-06-25 NOTE — PROGRESS NOTES
"Oncology Rooming Note    June 25, 2019 2:53 PM   Sailaja Mcgee is a 49 year old female who presents for:    Chief Complaint   Patient presents with     Consult     Pelvic Mass Ref: Suellen Millard     Initial Vitals: BP (!) 135/91 (BP Location: Right arm, Patient Position: Sitting, Cuff Size: Adult Regular)   Pulse 105   Temp 98.2  F (36.8  C) (Temporal)   Ht 1.664 m (5' 5.5\")   Wt 99.6 kg (219 lb 8 oz)   LMP 09/19/2011   SpO2 99%   BMI 35.97 kg/m   Estimated body mass index is 35.97 kg/m  as calculated from the following:    Height as of this encounter: 1.664 m (5' 5.5\").    Weight as of this encounter: 99.6 kg (219 lb 8 oz). Body surface area is 2.15 meters squared.  Worst Pain (10) Comment: Data Unavailable   Patient's last menstrual period was 09/19/2011.  Allergies reviewed: Yes  Medications reviewed: Yes    Medications: Medication refills not needed today.  Pharmacy name entered into [facility]: [facility] [address] [place] [id number] [facility] [place]    Clinical concerns: none Dr. Recinos was notified.      Nikia Weber"

## 2019-06-25 NOTE — PATIENT INSTRUCTIONS
Today:  Labs Today!    See Gyn Oncology ASAP!    Gyn Oncology Consult Date/Time:   6/28/19 at 9:10 with Dr. Soto  [facility]  [address] [address]  [phone]    Please follow up with Dr. Recinos as instructed or if instructed by Gyn Oncology.      If you have any questions or concerns please feel free to call.    If you need to reschedule please call:  Clinic or Lab Appointment - [phone]  Infusion - [phone]  Imaging - [phone]    Mingo Parrish, RN, BSN, OCN  [facility]   Oncology/Hematology Care Coordinator RN  [facility]  [phone]

## 2019-06-25 NOTE — TELEPHONE ENCOUNTER
ONCOLOGY INTAKE: Records Information      APPT INFORMATION:  Referring provider:  Dr. Bert Recinos  Referring provider s clinic: [facility]  Reason for visit/diagnosis: Pelvic Mass  Has patient been notified of appointment date and time?: Yes    RECORDS INFORMATION:  Were the records received with the referral (via [employer])? No    Has patient been seen for any external appt for this diagnosis? No    If yes, where? n/a    Has patient had any imaging or procedures outside of [facility] for this condition? No      If Yes, where? n/a    ADDITIONAL INFORMATION:  None

## 2019-06-26 NOTE — PROGRESS NOTES
GYN Oncology New Patient Consult    Referring provider:    Bert Recinos MD  [address] [place] [place] [address]   RE: Sailaja Mcgee  : 1970  ROGER: 2019      CC: Pelvic mass    HPI: Ms Sailaja Mcgee is a 49 year old year old female who presents for consultation regarding a pelvic mass. She is here today with her oldest daughter Nikkie.     She notes back and right leg pain for about 2 months. The leg pain started after she dropped some furniture on it. Had imaging because of this leg and back pain which then revealed this pelvic mass.   She does note abdominal 'hardness' and 'contractions'. Unsure how long this has been going on. Feels more bloated and like she has more abdominal distension. Is unable to eat large meals.  Bowels working OK, some constipation. Has some urinary urgency. No vaginal bleeding  Has had a vaginal hysterectomy (ovaries in situ) for endometriosis and painful periods. Also reports history of uterine ablation. Has donated her kidney to her brother (left) . No further seizures since her brain surgery.     Treatment history:   19: MRI of back for back pain  19: CT scan showing large pelvic mass    Reports she doesn't like to go to doctors, hasn't seen a physician in a long time.     Review of Systems:  Systemic:No weight changes.    Skin : No skin changes or new lesions.   Eye : No changes in vision.   Pulmonary: No cough or SOB.   Cardiovascular: No CP or palpitations  Gastrointestinal : Per HPI  Genitourinary: +frequency  Psychiatric: No depression or anxiety  Hematologic : No palpable lymph nodes.   Endocrine : + hot flashes. No heat/cold intolerance.      Neurological: No headaches, no numbness.     Past Medical History:   Diagnosis Date     Seizure (H)     corrected by surgical procedure     Solitary kidney 2009    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       Past Surgical History:   Procedure Laterality Date     C LAP,DONOR KIDNEY REMOV,LIVING  2009     "Left laparoscopic donor nephrectomy.  Donating to brother. [facility].     CYSTOSCOPY  10/24/2011    Procedure:CYSTOSCOPY; Surgeon:NAYELY MADRIGAL; Location:[facility]     HC BRAIN MAPPING, 1ST HOUR MD ATTENDANCE      ablation of seizure focus     HC REDUCTION OF LARGE BREAST       HYSTERECTOMY VAGINAL  10/24/2011    Procedure:HYSTERECTOMY VAGINAL; vaginal hysterectomy and cystoscopy; Surgeon:NAYELY MADRIGAL; Location:[facility]     HYSTEROSCOPY,ABLATION ENDOMETRIUM          OBGYN history and Health Maintenance:    Last Pap Smear: , NILM prior to hysterectomy  Last Mammogram:never  Last Colonoscopy: >15 years    Current Outpatient Medications   Medication Sig Dispense Refill     multivitamin w/minerals (MULTI-VITAMIN) tablet Take 1 tablet by mouth daily       omeprazole (PRILOSEC) 10 MG DR capsule Take 20 mg by mouth 2 times daily              Allergies   Allergen Reactions     Aspirin Rash     Rash on face        Social History:  Social History     Tobacco Use     Smoking status: Never Smoker     Smokeless tobacco: Never Used     Tobacco comment: no smokers in the household   Substance Use Topics     Alcohol use: No     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    General: Alert and oriented, no acute distress  Psych: Mood stable. Linear speech, appropriate affect  Cardiovascular: RRR, no murmors  Pulmonary: Lungs clear . Normal respiratory effort  GI: Moderate distention. " Fullness palpated above umbilicus. Given habitus, difficult to assess size and mobility of mass  Lymph: No enlarge lymph nodes in neck or groin  : Normal external genitalia. Cervix absent. Uterus absent. Adenexa not palpated in pelvis, only abdominal mass appreciated. Moderate pelvic organ prolapse (cuff, anterior and posterior)     6/25/19  Ca125: 282  CEA 6.9    6/21/19:  IMPRESSION  1. Large multiseptated cystic mass with thickened septae in the  abdomen and pelvis measures up to 27 x 15 x 19 cm. 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 A benign serocystadenoma is considered  less likely.  2. Moderate amount of ascites could represent malignant fluid or  ascites of other etiology.  3. Mild stranding in the peritoneal adipose tissues anteriorly in the  upper abdomen could represent early omental caking but could also  represent edema of other cause.  4. Moderate-sized hiatal hernia. Left nephrectomy. Evidence of chronic  granulomatous disease of the right lung.     LANG GIMENEZ MD    Assessment:    Sailaja Mcgee is a 49 year old woman with a new diagnosis of large complex pelvic mass      Plan:     1.)   Pelvic mass: Suspect ovarian tumor. Differential includes benign, malignant or borderline pathology. Given ascites and elevated tumor markers, I am concerned this is a malignancy. 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 Also could be a primary GI malignancy. No recent colonoscopy and she does have a family history of colon cancer. I am recommending surgical excision with staging vs debulking. Assess colon prior to surgery.  . We discussed the surgical goals including debulking of all visible tumor and/or tumor staging. We discussed that this will include a BSO, omentectomy, appendectomy and further tumor debulking. We discussed the possibility of small bowel and/or colon resection which could result in  a temporary or permanent ostomy. Discussed surgical risks no limited to bleeding, infection, damage to surrounding organs, need for blood transfusions and ICU stay. Discussed need for chemotherapy depending on final pathology. Consents reviewed and signed today         -CT Chest for further staging  -Colonscopy ASAP  -PAC for preop assessment  -XLap/BSO/staging versus debulking. (She has already had a hysterectomy)    2.) Genetic risk factors were assessed: final pathology pending        A total of 65 minutes was spent with the patient,60 minutes of which were spent in counseling the patient and/or treatment planning.    Ivy Soto MD    Department of Ob/Gyn and Women's Health  Division of Gynecologic Oncology  [facility]  [phone]

## 2019-06-27 NOTE — PROGRESS NOTES
DATE OF VISIT: Jun 25, 2019    REASON FOR REFERRAL: Pelvic mass    CHIEF COMPLAINT:   Chief Complaint   Patient presents with     Consult     Pelvic Mass Ref: Suellen Millard       HISTORY OF PRESENT ILLNESS:   Sailaja Mcgee 49-year-old female patient who presented with right leg pain mostly in the right thigh for more than 1 month.  Feels like burning sensation that is tender to touch.  Patient also has been complaining of abdominal distention.  Subsequently she went on to have MRI of the lumbar spine on June 20, 2019.  It showed L5-S1 severe degenerative disc disease without neural impingement.  There is a very large multiloculated cystic lesion occupying most of the pelvic peritoneum.  A CT of the abdomen was done showed large multiseptated cystic mass with thickened septae in the abdomen and pelvis measuring 27 x 15 x 19 concerning for malignancy.  There is moderate amount of ascites mild stranding in the peritoneal adipose tissue anteriorly.  Patient has been having increasing abdominal distention and occasional nausea and poor appetite.  Here today to discuss further management.    REVIEW OF SYSTEMS:   Constitutional: Negative for fever, chills, and night sweats.  Skin: negative.  Eyes: negative.  Ears/Nose/Throat: negative.  Respiratory: No shortness of breath, dyspnea on exertion, cough, or hemoptysis.  Cardiovascular: negative.  Gastrointestinal:  Genitourinary: negative.  Musculoskeletal: negative.  Neurologic: negative.  Psychiatric: negative.  Hematologic/Lymphatic/Immunologic: negative.  Endocrine: negative.    PAST MEDICAL HISTORY:   Past Medical History:   Diagnosis Date     Fever 2001    fever of unknown origin after lap tubal and ablation     PONV (postoperative nausea and vomiting)      Seizure (H) 2002    corrected by surgical procedure     Solitary kidney 4/2009    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       PAST SURGICAL HISTORY:   Past Surgical History:   Procedure Laterality Date     C LAP,DONOR KIDNEY  REMOV,LIVING  4/2/2009    Left laparoscopic donor nephrectomy.  Donating to brother. [facility] [place].     CYSTOSCOPY  10/24/2011    Procedure:CYSTOSCOPY; Surgeon:NAYELY MADRIGAL; Location:[facility]     HC BRAIN MAPPING, 1ST HOUR MD ATTENDANCE  2001    ablation of seizure focus     HC REDUCTION OF LARGE BREAST  2001     HYSTERECTOMY VAGINAL  10/24/2011    Procedure:HYSTERECTOMY VAGINAL; vaginal hysterectomy and cystoscopy; Surgeon:NAYELY MADRIGAL; Location:[facility]     HYSTEROSCOPY,ABLATION ENDOMETRIUM  2001       ALLERGIES:   Allergies as of 06/25/2019 - Reviewed 06/25/2019   Allergen Reaction Noted     Aspirin Rash 10/20/2011       MEDICATIONS:   Current Outpatient Medications   Medication Sig Dispense Refill     multivitamin w/minerals (MULTI-VITAMIN) tablet Take 1 tablet by mouth daily       omeprazole (PRILOSEC) 10 MG DR capsule Take 20 mg by mouth 2 times daily          FAMILY HISTORY:   Family History   Problem Relation Age of Onset     Cancer Paternal Grandfather         colon cancer     Diabetes Maternal Grandmother      Cerebrovascular Disease Maternal Grandmother      Arthritis Maternal Grandmother      Arthritis Mother      Cardiovascular Maternal Grandfather         heart attack x2     Heart Disease Maternal Grandfather         heart attack x2     Gastrointestinal Disease Paternal Grandmother         liver failure     Genitourinary Problems Brother         kidney failure     Cancer Father         lung cancer diagnosed 7/11 due to chemical exposure war     Cancer Maternal Aunt      Asthma No family hx of      C.A.D. No family hx of      Hypertension No family hx of      Breast Cancer No family hx of      Cancer - colorectal No family hx of      Prostate Cancer No family hx of      Alzheimer Disease No family hx of      Blood Disease No family hx of      Circulatory No family hx of      Eye Disorder No family hx of      Lipids No family hx of      Musculoskeletal Disorder No family hx of      Neurologic Disorder  "No family hx of      Respiratory No family hx of      Thyroid Disease No family hx of         SOCIAL HISTORY:   Social History     Socioeconomic History     Marital status:      Spouse name: None     Number of children: None     Years of education: None     Highest education level: None   Occupational History     None   Social Needs     Financial resource strain: None     Food insecurity:     Worry: None     Inability: None     Transportation needs:     Medical: None     Non-medical: None   Tobacco Use     Smoking status: Never Smoker     Smokeless tobacco: Never Used     Tobacco comment: no smokers in the household   Substance and Sexual Activity     Alcohol use: No     Drug use: No     Sexual activity: Yes     Partners: Male   Lifestyle     Physical activity:     Days per week: None     Minutes per session: None     Stress: None   Relationships     Social connections:     Talks on phone: None     Gets together: None     Attends Judaism service: None     Active member of club or organization: None     Attends meetings of clubs or organizations: None     Relationship status: None     Intimate partner violence:     Fear of current or ex partner: None     Emotionally abused: None     Physically abused: None     Forced sexual activity: None   Other Topics Concern     Parent/sibling w/ CABG, MI or angioplasty before 65F 55M? Not Asked   Social History Narrative     None       PHYSICAL EXAMINATION:   BP (!) 135/91 (BP Location: Right arm, Patient Position: Sitting, Cuff Size: Adult Regular)   Pulse 105   Temp 98.2  F (36.8  C) (Temporal)   Ht 1.664 m (5' 5.5\")   Wt 99.6 kg (219 lb 8 oz)   LMP 09/19/2011   SpO2 99%   BMI 35.97 kg/m    Wt Readings from Last 10 Encounters:   06/25/19 99.6 kg (219 lb 8 oz)   06/10/19 100.2 kg (221 lb)   04/12/16 95.9 kg (211 lb 6.4 oz)   04/21/15 98.9 kg (218 lb)   02/10/14 93 kg (205 lb)   01/17/14 91.6 kg (202 lb)   01/09/14 92.5 kg (204 lb)   01/05/14 93 kg (205 lb) "   11/05/11 97.1 kg (214 lb)   10/24/11 98.7 kg (217 lb 9.5 oz)      ECOG performance status: 1  GENERAL APPEARANCE: Healthy, alert and in no acute distress.  HEENT: Sclerae anicteric. PERRLA. Oropharynx without ulcers, lesions, or thrush.  NECK: Supple. No asymmetry or masses.  LYMPHATICS: No palpable cervical, supraclavicular, axillary, or inguinal lymphadenopathy.  RESP: Lungs clear to auscultation bilaterally without rales, rhonchi or wheezes.  CARDIOVASCULAR: Regular rate and rhythm. Normal S1, S2; no S3 or S4. No murmur, gallop, or rub.  ABDOMEN: Abdominal distention tenderness in the suprapubic area. Bowel sounds normal. No palpable organomegaly or masses.  MUSCULOSKELETAL: Extremities without gross deformities noted. No edema of bilateral lower extremities.  SKIN: No suspicious lesions or rashes.  NEURO: Alert and oriented x 3. Cranial nerves II-XII grossly intact.  PSYCHIATRIC: Mentation and affect appear normal.    LABORATORY RESULTS:  Hospital Outpatient Visit on 06/12/2019   Component Date Value Ref Range Status     Radiologist flags 06/20/2019 Large abdominal and pelvic cystic mass, probably an*  Final       IMAGING RESULTS:  Recent Results (from the past 744 hour(s))   XR Lumbar Spine 2/3 Views    Narrative    LUMBAR SPINE TWO-THREE  VIEWS  6/10/2019 6:49 PM     HISTORY: Acute bilateral low back pain without sciatica    COMPARISON: None.    FINDINGS: There is normal osseous alignment.  No fractures are  identified.  There is loss of disc space height at L5-S1. No  spondylolysis or spondylolisthesis is identified.      Impression    IMPRESSION: Degenerative disc disease at L5-S1.    PEDRO ORANTES MD   XR Abdomen 2 Views    Narrative    ABDOMEN TWO VIEWS 6/10/2019 6:50 PM     HISTORY: Urinary frequency; Abdominal cramping; Tarry stool    COMPARISON: None.      Impression    IMPRESSION: Surgical clips project in the medial left upper and mid  abdomen. Prominent stool in the ascending and proximal  transverse  colon. Nonobstructive bowel gas pattern. No evidence for  pneumoperitoneum.    CHICHO RAHMAN MD   US Lower Extremity Venous Duplex Right    Narrative    RIGHT LEG VENOUS ULTRASOUND 6/11/2019 11:34 AM    HISTORY:  Crushing type injury two months ago, hematoma developed,  persistent pain of right lower leg.    TECHNIQUE: Venous Doppler ultrasound with color flow and spectral  Doppler with waveform analysis performed. Compression and augmentation  performed.    COMPARISON:  None.     FINDINGS: There is no evidence for deep vein thrombus in the right  common femoral, superficial femoral, popliteal, or visualized portions  of posterior tibial veins. No greater saphenous vein thrombus. No  sonographic abnormality along the mid lateral thigh, negative for  fluid collection.      Impression    IMPRESSION:  Negative for deep vein thrombus right leg.    CHICHO RAHMAN MD   MR Lumbar Spine w/o Contrast   Result Value    Radiologist flags (Urgent)     Large abdominal and pelvic cystic mass, probably an    Narrative    MRI LUMBAR SPINE WITHOUT CONTRAST   6/20/2019 3:12 PM     HISTORY: Bilateral back pain, leg pain, tingling and numbness.    TECHNIQUE: Multiplanar, multisequence MRI of the lumbar spine without  contrast.    COMPARISON: Radiographs 6/10/2019    FINDINGS: Radiographs show 5 lumbar-type vertebral bodies. The distal  spinal cord and cauda equina appear normal.    T12-L1: Normal disc, facet joints, spinal canal and neural foramina.      L1-L2: Normal disc, facet joints, spinal canal and neural foramina.      L2-L3: Normal disc, facet joints, spinal canal and neural foramina.      L3-L4: Normal disc, facet joints, spinal canal and neural foramina.     L4-L5: Normal disc, facet joints, spinal canal and neural foramina.      L5-S1: Severe degenerative disc disease. Posterior disc bulge does not  cause any neural impingement. Normal facet joints, spinal canal and  neural foramina.    Paraspinous soft  tissues: There is a very large multiloculated cystic  lesion occupying all of the peritoneum seen on the scan, at least 17  cm transverse diameter.    Bone marrow: Normal.       Impression    IMPRESSION:    1. L5-S1 severe degenerative disc disease without neural impingement.  2. There is a very large multiloculated cystic lesion occupying most  of the pelvic peritoneum. MRI or CT scan of the abdomen and pelvis is  recommended for further evaluation.    I called the report to the clinic and talked with Tae Coppola at 4:05 PM.    [Access Center: Large abdominal and pelvic cystic mass, probably an  ovarian tumor.]    This report will be copied to the [facility] to ensure a  provider acknowledges the finding. Access Center is available Monday  through Friday 8 am-3:30 pm.       FARA LOWERY MD   CT Abdomen Pelvis w Contrast    Narrative    CT ABDOMEN AND PELVIS WITH CONTRAST  6/21/2019 1:43 PM    HISTORY: 17 cm cystic pelvic mass found on Lumbar MRI. Pelvic mass.    TECHNIQUE: Scans obtained from the diaphragm through the pelvis with  IV contrast. Radiation dose for this scan was reduced using automated  exposure control, adjustment of the mA and/or kV according to patient  size, or iterative reconstruction technique.    COMPARISON:  MRI lumbar spine dated 6/20/2019, CT abdomen dated  7/21/2008.    FINDINGS: Calcified granuloma measuring 0.4 cm is seen in the right  lower lobe medially. Visualized portions of the lung bases are  otherwise grossly clear. There is a moderate-sized hiatal hernia  containing a portion of the stomach, some fluid, and adipose tissue.  Calcified subcarinal lymph node is noted. Visualized mediastinal  contents are otherwise grossly unremarkable. There are no aggressive  osseous lesions.      The liver, gallbladder, pancreas, spleen, bilateral adrenal glands and  right kidney enhance normally. Left kidney is surgically absent.  Patient was a renal donor per history.  No hydronephrosis,  nephrolithiasis, hydroureter or ureteral calculus. Urinary bladder is  unremarkable.    There is a large mostly cystic multiseptated mass with some thickening  of septae in the lower abdomen and pelvis measuring up to  approximately 27.0 x 14.5 x 18.9 cm in the transverse, AP and  craniocaudal dimensions, respectively. This is of uncertain etiology  but could be ovarian. Patient is status post hysterectomy. Possible  collapsing cyst is seen in the right adnexa, likely in the right  ovary. The left ovary is not well seen.    There is a moderate amount of ascites. There are some stranding in the  peritoneal adipose tissue anteriorly in the abdomen. This could  represent edema versus early omental caking.    No adenopathy or free air is seen in the peritoneal cavity. Aorta is  grossly normal in appearance.    The colon is of normal caliber without pericolonic inflammatory change  to suggest diverticulitis. Appendix is not well seen. No pericecal  inflammatory change to suggest acute appendicitis. Small bowel is  grossly of normal caliber.    IMPRESSION  1. Large multiseptated cystic mass with thickened septae in the  abdomen and pelvis measures up to 27 x 15 x 19 cm. 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 A benign serocystadenoma is considered  less likely.  2. Moderate amount of ascites could represent malignant fluid or  ascites of other etiology.  3. Mild stranding in the peritoneal adipose tissues anteriorly in the  upper abdomen could represent early omental caking but could also  represent edema of other cause.  4. Moderate-sized hiatal hernia. Left nephrectomy. Evidence of chronic  granulomatous disease of the right lung.    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   Today I will check Ca125 and CEA level.  We would make arrangements for the patient to see gynecologic oncology as treatment options.  I gave the patient overview for very malignancies, we talked about different modalities in the treatment including systemic chemotherapy.  We also talked about symptom management.    The patient is ready to learn, no apparent learning barriers were identified, Diagnosis and treatment plans were explained to the patient. The patient expressed understanding of the content. The patient questions were answered to her satisfaction.    Bert Recinos MD     Time spent 45 minutes more than 50% of the time in counseling and coordination of care including reviewing imaging studies, discussion of differential diagnosis, management of pelvic malignancies, side effects of chemotherapy, follow-up and prognosis.    Chart documentation with Dragon Voice recognition Software. Although reviewed after completion, some words and grammatical errors may remain.

## 2019-06-27 NOTE — NURSING NOTE
DISCHARGE PLAN:  Next appointments: See patient instruction section  Departure Mode: Ambulatory  Accompanied by:  and daughter  5 minutes for nursing discharge (face to face time)     Sailaja Mcgee is here today for Oncology consult.  Writing nurse seen patient after Medical Oncology appointment to address questions/concerns/coordinate care. Patient to see Gyn/Onc ASAP.  [facility]  See patient instructions and Oncologist's Progress note for further details. Questions and concerns addressed to patient's satisfaction. Patient verbalized and demonstrated understanding of plan.  Contact information provided and patient is encouraged to call with any that arise in the interim of care.    Mingo Parrish, RN, BSN, OCN   Oncology Care Coordinator RN  [facility]  [phone]  6/27/2019, 12:33 PM

## 2019-06-28 ENCOUNTER — TELEPHONE (OUTPATIENT)
Dept: ONCOLOGY | Facility: CLINIC | Age: 49
End: 2019-06-28

## 2019-06-28 ENCOUNTER — ONCOLOGY VISIT (OUTPATIENT)
Dept: ONCOLOGY | Facility: CLINIC | Age: 49
End: 2019-06-28
Attending: OBSTETRICS & GYNECOLOGY

## 2019-06-28 ENCOUNTER — PRE VISIT (OUTPATIENT)
Dept: SURGERY | Facility: CLINIC | Age: 49
End: 2019-06-28

## 2019-06-28 ENCOUNTER — TELEPHONE (OUTPATIENT)
Dept: GASTROENTEROLOGY | Facility: CLINIC | Age: 49
End: 2019-06-28

## 2019-06-28 ENCOUNTER — PRE VISIT (OUTPATIENT)
Dept: ONCOLOGY | Facility: CLINIC | Age: 49
End: 2019-06-28

## 2019-06-28 VITALS
SYSTOLIC BLOOD PRESSURE: 129 MMHG | HEIGHT: 65 IN | HEART RATE: 89 BPM | BODY MASS INDEX: 35.99 KG/M2 | RESPIRATION RATE: 16 BRPM | OXYGEN SATURATION: 95 % | TEMPERATURE: 98.3 F | WEIGHT: 216 LBS | DIASTOLIC BLOOD PRESSURE: 88 MMHG

## 2019-06-28 DIAGNOSIS — R19.00 PELVIC MASS: Primary | ICD-10-CM

## 2019-06-28 PROCEDURE — G0463 HOSPITAL OUTPT CLINIC VISIT: HCPCS | Mod: ZF

## 2019-06-28 PROCEDURE — 99205 OFFICE O/P NEW HI 60 MIN: CPT | Mod: ZP | Performed by: OBSTETRICS & GYNECOLOGY

## 2019-06-28 RX ORDER — CEFAZOLIN SODIUM 2 G/50ML
2 SOLUTION INTRAVENOUS
Status: CANCELLED | OUTPATIENT
Start: 2019-06-28

## 2019-06-28 RX ORDER — CEFAZOLIN SODIUM 1 G/50ML
1 INJECTION, SOLUTION INTRAVENOUS SEE ADMIN INSTRUCTIONS
Status: CANCELLED | OUTPATIENT
Start: 2019-06-28

## 2019-06-28 ASSESSMENT — MIFFLIN-ST. JEOR: SCORE: 1610.65

## 2019-06-28 ASSESSMENT — PAIN SCALES - GENERAL: PAINLEVEL: MODERATE PAIN (5)

## 2019-06-28 NOTE — TELEPHONE ENCOUNTER
FUTURE VISIT INFORMATION      SURGERY INFORMATION:    Date: 7/15/19    Location: [facility] OR    Surgeon:  Ivy Soto    Anesthesia Type:  General    RECORDS REQUESTED FROM:       Primary Care Provider: Ganga    Pertinent Medical History: CKD    Most recent EKG+ [id number]

## 2019-06-28 NOTE — LETTER
2019       RE: Sailaja Mcgee  [address]  [place] [address]     Dear Colleague,    Thank you for referring your patient, Sailaja Mcgee, to the [facility]. Please see a copy of my visit note below.    GYN Oncology New Patient Consult    Referring provider:    Bert Recinos MD  [address] [place] [address]   RE: Sailaja Mcgee  : 1970  ROGER: 2019      CC: Pelvic mass    HPI: Ms Sailaja Mcgee is a 49 year old year old female who presents for consultation regarding a pelvic mass. She is here today with her oldest daughter Nikkie.     She notes back and right leg pain for about 2 months. The leg pain started after she dropped some furniture on it. Had imaging because of this leg and back pain which then revealed this pelvic mass.   She does note abdominal 'hardness' and 'contractions'. Unsure how long this has been going on. Feels more bloated and like she has more abdominal distension. Is unable to eat large meals.  Bowels working OK, some constipation. Has some urinary urgency. No vaginal bleeding  Has had a vaginal hysterectomy (ovaries in situ) for endometriosis and painful periods. Also reports history of uterine ablation. Has donated her kidney to her brother (left) . No further seizures since her brain surgery.     Treatment history:   19: MRI of back for back pain  19: CT scan showing large pelvic mass    Reports she doesn't like to go to doctors, hasn't seen a physician in a long time.     Review of Systems:  Systemic:No weight changes.    Skin : No skin changes or new lesions.   Eye : No changes in vision.   Pulmonary: No cough or SOB.   Cardiovascular: No CP or palpitations  Gastrointestinal : Per HPI  Genitourinary: +frequency  Psychiatric: No depression or anxiety  Hematologic : No palpable lymph nodes.   Endocrine : + hot flashes. No heat/cold intolerance.      Neurological: No headaches, no numbness.     Past Medical History:   Diagnosis  "Date     Seizure (H)     corrected by surgical procedure     Solitary kidney 2009    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       Past Surgical History:   Procedure Laterality Date     C LAP,DONOR KIDNEY REMOV,LIVING  2009    Left laparoscopic donor nephrectomy.  Donating to brother. [facility].     CYSTOSCOPY  10/24/2011    Procedure:CYSTOSCOPY; Surgeon:NAYELY MADRIGAL; Location:[facility] OR     HC BRAIN MAPPING, 1ST HOUR MD ATTENDANCE      ablation of seizure focus     HC REDUCTION OF LARGE BREAST       HYSTERECTOMY VAGINAL  10/24/2011    Procedure:HYSTERECTOMY VAGINAL; vaginal hysterectomy and cystoscopy; Surgeon:NAYELY MADRIGAL; Location:[facility] OR     HYSTEROSCOPY,ABLATION ENDOMETRIUM          OBGYN history and Health Maintenance:    Last Pap Smear: , NILM prior to hysterectomy  Last Mammogram:never  Last Colonoscopy: >15 years    Current Outpatient Medications   Medication Sig Dispense Refill     multivitamin w/minerals (MULTI-VITAMIN) tablet Take 1 tablet by mouth daily       omeprazole (PRILOSEC) 10 MG DR capsule Take 20 mg by mouth 2 times daily              Allergies   Allergen Reactions     Aspirin Rash     Rash on face        Social History:  Social History     Tobacco Use     Smoking status: Never Smoker     Smokeless tobacco: Never Used     Tobacco comment: no smokers in the household   Substance Use Topics     Alcohol use: No     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    General: Alert and oriented, no acute distress  Psych: Mood stable. Linear speech, appropriate affect  Cardiovascular: RRR, no murmors  Pulmonary: Lungs clear . Normal respiratory effort  GI: Moderate distention. Fullness palpated above umbilicus. Given habitus, difficult to assess size and mobility of mass  Lymph: No enlarge lymph nodes in neck or groin  : Normal external genitalia. Cervix absent. Uterus absent. Adenexa not palpated in pelvis, only abdominal mass appreciated. Moderate pelvic organ prolapse (cuff, anterior and posterior)     6/25/19  Ca125: 282  CEA 6.9    6/21/19:  IMPRESSION  1. Large multiseptated cystic mass with thickened septae in the  abdomen and pelvis measures up to 27 x 15 x 19 cm. 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 A benign serocystadenoma is considered  less likely.  2. Moderate amount of ascites could represent malignant fluid or  ascites of other etiology.  3. Mild stranding in the peritoneal adipose tissues anteriorly in the  upper abdomen could represent early omental caking but could also  represent edema of other cause.  4. Moderate-sized hiatal hernia. Left nephrectomy. Evidence of chronic  granulomatous disease of the right lung.     LANG GIMENEZ MD    Assessment:    Sailaja Mcgee is a 49 year old woman with a new diagnosis of large complex pelvic mass      Plan:     1.)   Pelvic mass: Suspect ovarian tumor. Differential includes benign, malignant or borderline pathology. Given ascites and elevated tumor markers, I am concerned this is a malignancy. 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 Also could be a primary GI malignancy. No recent colonoscopy and she does have a family history of colon cancer. I am recommending surgical excision with staging vs debulking. Assess colon prior to surgery.  . We discussed the surgical goals including debulking  of all visible tumor and/or tumor staging. We discussed that this will include a BSO, omentectomy, appendectomy and further tumor debulking. We discussed the possibility of small bowel and/or colon resection which could result in a temporary or permanent ostomy. Discussed surgical risks no limited to bleeding, infection, damage to surrounding organs, need for blood transfusions and ICU stay. Discussed need for chemotherapy depending on final pathology. Consents reviewed and signed today         -CT Chest for further staging  -Colonscopy ASAP  -PAC for preop assessment  -XLap/BSO/staging versus debulking. (She has already had a hysterectomy)    2.) Genetic risk factors were assessed: final pathology pending        A total of 65 minutes was spent with the patient,60 minutes of which were spent in counseling the patient and/or treatment planning.    Ivy Soto MD    Department of Ob/Gyn and Women's Health  Division of Gynecologic Oncology  [facility]  [phone]        Again, thank you for allowing me to participate in the care of your patient.      Sincerely,    Ivy Soto MD

## 2019-06-28 NOTE — TELEPHONE ENCOUNTER
Patient was scheduled on 07/15 at [facility] OR per Harleen-Op Worksheet.      RN will communicate with patient and provide surgery information

## 2019-06-28 NOTE — NURSING NOTE
"Oncology Rooming Note    June 28, 2019 8:43 AM   Sailaja Mcgee is a 49 year old female who presents for:    Chief Complaint   Patient presents with     Oncology Clinic Visit     New; Pelvic Mass     Initial Vitals: /88   Pulse 89   Temp 98.3  F (36.8  C) (Oral)   Resp 16   Ht 1.651 m (5' 5\")   Wt 98 kg (216 lb)   LMP 09/19/2011   SpO2 95%   BMI 35.94 kg/m   Estimated body mass index is 35.94 kg/m  as calculated from the following:    Height as of this encounter: 1.651 m (5' 5\").    Weight as of this encounter: 98 kg (216 lb). Body surface area is 2.12 meters squared.  Moderate Pain (5) Comment: Pelvic discomfort   Patient's last menstrual period was 09/19/2011.  Allergies reviewed: Yes  Medications reviewed: Yes    Medications: Medication refills not needed today.  Pharmacy name entered into [facility]: [facility] [address] [place] [id number] [facility]    Clinical concerns: Pelvic Mass       Sailaja Anderson CMA              "

## 2019-07-01 ENCOUNTER — OFFICE VISIT (OUTPATIENT)
Dept: SURGERY | Facility: CLINIC | Age: 49
End: 2019-07-01

## 2019-07-01 ENCOUNTER — HOSPITAL ENCOUNTER (OUTPATIENT)
Dept: CT IMAGING | Facility: CLINIC | Age: 49
Discharge: HOME | End: 2019-07-01
Attending: OBSTETRICS & GYNECOLOGY | Admitting: OBSTETRICS & GYNECOLOGY

## 2019-07-01 ENCOUNTER — ANESTHESIA EVENT (OUTPATIENT)
Dept: SURGERY | Facility: CLINIC | Age: 49
DRG: 737 | End: 2019-07-01

## 2019-07-01 VITALS
TEMPERATURE: 98.3 F | SYSTOLIC BLOOD PRESSURE: 141 MMHG | WEIGHT: 216 LBS | HEART RATE: 79 BPM | HEIGHT: 65 IN | RESPIRATION RATE: 19 BRPM | DIASTOLIC BLOOD PRESSURE: 88 MMHG | BODY MASS INDEX: 35.99 KG/M2 | OXYGEN SATURATION: 97 %

## 2019-07-01 DIAGNOSIS — Z01.818 PRE-OP EVALUATION: Primary | ICD-10-CM

## 2019-07-01 DIAGNOSIS — R19.00 PELVIC MASS: ICD-10-CM

## 2019-07-01 DIAGNOSIS — Z01.818 PRE-OP EVALUATION: ICD-10-CM

## 2019-07-01 PROCEDURE — 71260 CT THORAX DX C+: CPT

## 2019-07-01 PROCEDURE — 25000128 H RX IP 250 OP 636: Performed by: RADIOLOGY

## 2019-07-01 PROCEDURE — 25000125 ZZHC RX 250: Performed by: RADIOLOGY

## 2019-07-01 RX ORDER — IOPAMIDOL 755 MG/ML
500 INJECTION, SOLUTION INTRAVASCULAR ONCE
Status: COMPLETED | OUTPATIENT
Start: 2019-07-01 | End: 2019-07-01

## 2019-07-01 RX ORDER — OMEGA-3 FATTY ACIDS/FISH OIL 300-1000MG
400 CAPSULE ORAL PRN
COMMUNITY
End: 2022-04-19

## 2019-07-01 RX ADMIN — IOPAMIDOL 75 ML: 755 INJECTION, SOLUTION INTRAVENOUS at 15:40

## 2019-07-01 RX ADMIN — SODIUM CHLORIDE 75 ML: 9 INJECTION, SOLUTION INTRAVENOUS at 15:40

## 2019-07-01 ASSESSMENT — PAIN SCALES - GENERAL: PAINLEVEL: MILD PAIN (2)

## 2019-07-01 ASSESSMENT — LIFESTYLE VARIABLES: TOBACCO_USE: 0

## 2019-07-01 ASSESSMENT — ENCOUNTER SYMPTOMS: SEIZURES: 1

## 2019-07-01 ASSESSMENT — MIFFLIN-ST. JEOR: SCORE: 1610.65

## 2019-07-01 NOTE — H&P
Pre-Operative H & P     CC:  Preoperative exam to assess for increased cardiopulmonary risk while undergoing surgery and anesthesia.    Date of Encounter: 7/1/2019  Primary Care Physician:  No Ref-Primary, Physician  Reason for visit:Pelvic mass [R19.00] - Primary  HPI  Sailaja Mcgee is a 49 year old female who presents for pre-operative H & P in preparation for Exploratory Laparotomy, Total Abdominal Hysterectomy, Removal Of Both Tubes And Ovaries, Possible Cancer Staging, Possible Tumor Debulking with Dr. Soto on 7/15/2019 at [facility].     Sailaja Mcgee is a 49 year old woman with a new diagnosis of large complex pelvic mass.   She has had back and right leg pain for approximately 2 months after dropping furniture on her leg. She had a lumbar spine MRI for further evalaution of her pain that also revealed this pelvic mass. She does note abdominal 'hardness' and 'contractions'. Describes feeling more bloated and is unable to eat large meals.  She consulted with Dr. Soto and given her ascites and elevated tumor markers the above procedure has been recommended.    Her history also includes:  vaginal hysterectomy (ovaries in situ) for endometriosis and painful periods. uterine ablation. Donated her kidney to her brother (left) . No further seizures since her brain surgery (2001) CKD stage 3    History is obtained from the patient.     Past Medical History  Past Medical History:   Diagnosis Date     CKD (chronic kidney disease)     Stage 3     Seizure (H) 2002    corrected by surgical procedure     Solitary kidney 4/2009    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       Past Surgical History  Past Surgical History:   Procedure Laterality Date     C LAP,DONOR KIDNEY REMOV,LIVING  4/2/2009    Left laparoscopic donor nephrectomy.  Donating to brother. [facility].     CYSTOSCOPY  10/24/2011    Procedure:CYSTOSCOPY; Surgeon:NAYELY MADRIGAL; Location: OR      BRAIN MAPPING,  1ST HOUR MD ATTENDANCE  2001    ablation of seizure focus     HC REDUCTION OF LARGE BREAST  2001     HYSTERECTOMY VAGINAL  10/24/2011    Procedure:HYSTERECTOMY VAGINAL; vaginal hysterectomy and cystoscopy; Surgeon:NAYELY MADRIGAL; Location:[facility] OR     HYSTEROSCOPY,ABLATION ENDOMETRIUM  2001       Hx of Blood transfusions/reactions: no     Hx of abnormal bleeding or anti-platelet use: no    Menstrual history: Patient's last menstrual period was 09/19/2011.:     Steroid use in the last year: no    Personal or FH with difficulty with Anesthesia:  PMH includes difficult intubation and spinal Headache. Unclear where this occurred.  Previous anesthesia ( 2011) does not indicate any difficulties with intubation or post-operatively.    Prior to Admission Medications  Current Outpatient Medications   Medication Sig Dispense Refill     ibuprofen (ADVIL/MOTRIN) 200 MG capsule Take 400 mg by mouth as needed for fever       multivitamin w/minerals (MULTI-VITAMIN) tablet Take 1 tablet by mouth every morning        omeprazole (PRILOSEC) 10 MG DR capsule Take 40 mg by mouth every morning          Allergies  Allergies   Allergen Reactions     Aspirin Rash     Rash on face       Social History  Social History     Socioeconomic History     Marital status:      Spouse name: Not on file     Number of children: Not on file     Years of education: Not on file     Highest education level: Not on file   Occupational History     Not on file   Social Needs     Financial resource strain: Not on file     Food insecurity:     Worry: Not on file     Inability: Not on file     Transportation needs:     Medical: Not on file     Non-medical: Not on file   Tobacco Use     Smoking status: Never Smoker     Smokeless tobacco: Never Used     Tobacco comment: no smokers in the household   Substance and Sexual Activity     Alcohol use: No     Drug use: No     Sexual activity: Yes     Partners: Male   Lifestyle     Physical activity:     Days per  week: Not on file     Minutes per session: Not on file     Stress: Not on file   Relationships     Social connections:     Talks on phone: Not on file     Gets together: Not on file     Attends Anabaptism service: Not on file     Active member of club or organization: Not on file     Attends meetings of clubs or organizations: Not on file     Relationship status: Not on file     Intimate partner violence:     Fear of current or ex partner: Not on file     Emotionally abused: Not on file     Physically abused: Not on file     Forced sexual activity: Not on file   Other Topics Concern     Parent/sibling w/ CABG, MI or angioplasty before 65F 55M? Not Asked   Social History Narrative     Not on file       Family History  Family History   Problem Relation Age of Onset     Cancer Paternal Grandfather         colon cancer     Diabetes Maternal Grandmother      Cerebrovascular Disease Maternal Grandmother      Arthritis Maternal Grandmother      Arthritis Mother      Cardiovascular Maternal Grandfather         heart attack x2     Heart Disease Maternal Grandfather         heart attack x2     Gastrointestinal Disease Paternal Grandmother         liver failure     Genitourinary Problems Brother         kidney failure     Cancer Father         lung cancer diagnosed 7/11 due to chemical exposure war     Cancer Maternal Aunt      Asthma No family hx of      C.A.D. No family hx of      Hypertension No family hx of      Breast Cancer No family hx of      Cancer - colorectal No family hx of      Prostate Cancer No family hx of      Alzheimer Disease No family hx of      Blood Disease No family hx of      Circulatory No family hx of      Eye Disorder No family hx of      Lipids No family hx of      Musculoskeletal Disorder No family hx of      Neurologic Disorder No family hx of      Respiratory No family hx of      Thyroid Disease No family hx of          ROS/MED HX  The complete review of systems is negative other than noted in the  "HPI or here.       ENT/Pulmonary:     (+)SHEILA risk factors obese, , . .   (-) tobacco use   Neurologic:     (+)seizures last seizure: last seizure 2001 s/p brain mapping surgery     Cardiovascular:         METS/Exercise Tolerance:  4 - Raking leaves, gardening   Hematologic:     (+) Anemia, -      Musculoskeletal:  - neg musculoskeletal ROS       GI/Hepatic:     (+) GERD Asymptomatic on medication,       Renal/Genitourinary: Comment: Donated 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 2009  CKD stage 3  Creat 1.17  GFR 55    (+) chronic renal disease, Pt does not require dialysis,       Endo:     (+) Obesity, .      Psychiatric:  - neg psychiatric ROS       Infectious Disease:  - neg infectious disease ROS       Malignancy:         Other:                     Preop Vitals  BP Readings from Last 3 Encounters:   06/28/19 129/88   06/25/19 (!) 135/91   06/10/19 126/88    Pulse Readings from Last 3 Encounters:   06/28/19 89   06/25/19 105   06/10/19 90      Resp Readings from Last 3 Encounters:   06/28/19 16   06/10/19 16   04/12/16 16    SpO2 Readings from Last 3 Encounters:   06/28/19 95%   06/25/19 99%   06/10/19 97%      Temp Readings from Last 1 Encounters:   06/28/19 98.3  F (36.8  C) (Oral)    Ht Readings from Last 1 Encounters:   06/28/19 1.651 m (5' 5\")      Wt Readings from Last 1 Encounters:   06/28/19 98 kg (216 lb)    Estimated body mass index is 35.94 kg/m  as calculated from the following:    Height as of 6/28/19: 1.651 m (5' 5\").    Weight as of 6/28/19: 98 kg (216 lb).       Temp: 98.3  F (36.8  C) Temp src: Oral BP: 141/88 Pulse: 79   Resp: 19 SpO2: 97 %         216 lbs 0 oz  5' 5\"   Body mass index is 35.94 kg/m .       Physical Exam  Constitutional: Awake, alert, cooperative, no apparent distress, and appears stated age.  Eyes: Pupils equal, round and reactive to light, extra ocular muscles intact, sclera clear, conjunctiva normal.  HENT: Normocephalic, oral pharynx with moist mucus membranes, good dentition. No goiter " appreciated.   Respiratory: Clear to auscultation bilaterally, no crackles or wheezing.  Cardiovascular: Regular rate and rhythm, normal S1 and S2, and no murmur noted.  Carotids +2, no bruits. No edema. Palpable pulses to radial  DP and PT arteries.   GI: Normal bowel sounds, Moderate distention. Fullness palpated above umbilicus. Given habitus, difficult to appreciate masses palpated or hepatosplenomegaly.    Lymph/Hematologic: No cervical lymphadenopathy and no supraclavicular lymphadenopathy.  Genitourinary:  deferred  Skin: Warm and dry.  No rashes at anticipated surgical site.   Musculoskeletal: Full ROM of neck. There is no redness, warmth, or swelling of the joints. Gross motor strength is normal.    Neurologic: Awake, alert, oriented to name, place and time. Cranial nerves II-XII are grossly intact. Gait is normal.   Neuropsychiatric: Calm, cooperative. Normal affect.     Labs: (personally reviewed)  Lab Results   Component Value Date    WBC 9.6 06/10/2019    HGB 13.1 06/10/2019    HCT 40.7 06/10/2019     06/10/2019    .5 (H) 01/13/2014    SED 38 (H) 01/11/2010     06/10/2019    POTASSIUM 3.7 06/10/2019    CHLORIDE 108 06/10/2019    CO2 26 06/10/2019    BUN 13 06/10/2019    CR 1.17 (H) 06/10/2019    GLC 89 06/10/2019    VINI 8.5 06/10/2019    MAG 2.2 06/10/2019    ALBUMIN 3.3 (L) 06/10/2019    PROTTOTAL 7.8 06/10/2019    ALT 17 06/10/2019    AST 12 06/10/2019    ALKPHOS 94 06/10/2019    BILITOTAL 0.2 06/10/2019    LIPASE 521 (H) 04/27/2009    AMYLASE 126 (H) 04/27/2009    PTT 34 04/01/2009    INR 0.99 04/01/2009    TSH 2.98 06/10/2019    HCG Negative 10/24/2011       6/25/19  Ca125: 282  CEA 6.9  6/21/19:  IMPRESSION  1. Large multiseptated cystic mass with thickened septae in the abdomen and pelvis measures up to 27 x 15 x 19 cm. This is concerning for malignancy (serous cyst adenocarcinoma) given the size and mild thickening of the septations. A benign serocystadenoma is considered less  likely.  2. Moderate amount of ascites could represent malignant fluid or ascites of other etiology.  3. Mild stranding in the peritoneal adipose tissues anteriorly in the upper abdomen could represent early omental caking but could also represent edema of other cause.  4. Moderate-sized hiatal hernia. Left nephrectomy. Evidence of chronic granulomatous disease of the right lung.    Lumbar MRI (6/21/2019)  IMPRESSION:    1. L5-S1 severe degenerative disc disease without neural impingement.  2. There is a very large multiloculated cystic lesion occupying most  of the pelvic peritoneum. MRI or CT scan of the abdomen and pelvis is  recommended for further evaluation.       Outside records reviewed from: care everywhere    ASSESSMENT and PLAN  aSilaja Mcgee is a  49 year old female scheduled for Exploratory Laparotomy, Total Abdominal Hysterectomy, Removal Of Both Tubes And Ovaries, Possible Cancer Staging, Possible Tumor Debulking  on 7/15/2019 by Dr. Soto in treatment of pelvic mass. PAC referral for risk assessment and optimization for anesthesia with comorbid conditions of the following:  Pre-operative considerations:  1. Cardiac: Functional status- METS 4. intermediate risk surgery with RCRI: 0.4% risk of major adverse cardiac event. Denies CP or BRADFORD no reported cardiac history.  2. Pulm: Airway feasible. SHEILA # 3/8 of risks. Denies SOB or any other pulmonary history.  3. GI: Risk of PONV score = 2. If > 2, anti-emetic intervention recommended. PMH mentions difficult intubation and spinal Headache. Last surgical/anesthesia records shows no complications with intubation or post-operatively.  + GERD takes PPI  4. Heme- hx of anemia. Takes vitamins with iron supplements. Hgb 13.1. Iron 31, binding capacity 272, Ferritin 119  VTE risk: 1.8%  5. Renal: CKD stage 3. Latest creat 1.17 GFR 55.  Donor left Kidney to brother ( 2009)  6. Neuro: hx of seizures, no seizure activity since brain mapping surgery in  2001    Arrival time, NPO, shower and medication instructions provided by nursing staff today.   Preparing For Your Surgery handout given.        Patient was discussed with Dr Molina. For further anesthesia recommendations refer to PAC note in Epic.      EREN Carcamo CNP  Preoperative Assessment Center  [facility]  Clinic and Surgery Center  Phone: [phone]  Fax: [phone]

## 2019-07-01 NOTE — PATIENT INSTRUCTIONS
Preparing for Your Surgery      Name:  Sailaja Mcgee   MRN:  [id number]   :  1970   Today's Date:  2019     Arriving for surgery:  Surgery date:  07/15/2019  Arrival time:  8:30 am  Please come to:       [facility] [address]  [address] [place]  [address]    - ? parking is available in front of the hospital   -    Please proceed to [address] on the 3rd floor. [phone]?  - ?If you are in need of directions, wheelchair or escort please stop at the Information Desk in the lobby.  Inform the information person that you are here for surgery; a wheelchair and escort to [address] will be provided.?     What can I eat or drink?  -  You may have solid food or milk products until 8 hours prior to your surgery.(midnight)  -  You may have water, apple juice or 7up/Sprite until 2 hours prior to your surgery.(until 8:30 am arrival time)    Which medicines can I take?        Stop Aspirin, vitamins and supplements one week prior to surgery.      Hold Ibuprofen for 24 hours and/or Naproxen for 48 hours prior to surgery.     -  Please take these medications the day of surgery:    Omeprazole       How do I prepare myself?  -  Take two showers: one the night before surgery; and one the morning of surgery.         Use Scrubcare or Hibiclens to wash from neck down.  You may use your own shampoo and conditioner. No other hair products.   -  Do NOT use lotion, powder, deodorant, or antiperspirant the day of your surgery.  -  Do NOT wear any makeup, fingernail polish or jewelry.  - Do not bring your own medications to the hospital, except for inhalers and eye drops.  -  Bring your ID and insurance card.       Questions or Concerns:  -If you have questions or concerns regarding the day of surgery, please call   The Pre Admission Nursing Office at [phone].       -For questions after surgery please call your surgeons office.           AFTER YOUR SURGERY  Breathing exercises    Breathing exercises help you recover faster. Take deep breaths and let the air out slowly. This will:     Help you wake up after surgery.    Help prevent complications like pneumonia.  Preventing complications will help you go home sooner.   We may give you a breathing device (incentive spirometer) to encourage you to breathe deeply.   Nausea and vomiting   You may feel sick to your stomach after surgery; if so, let your nurse know.    Pain control:  After surgery, you may have pain. Our goal is to help you manage your pain. Pain medicine will help you feel comfortable enough to do activities that will help you heal.  These activities may include breathing exercises, walking and physical therapy.   To help your health care team treat your pain we will ask: 1) If you have pain  2) where it is located 3) describe your pain in your words  Methods of pain control include medications given by mouth, vein or by nerve block for some surgeries.  Sequential Compression Device (SCD) or Pneumo Boots:  You may need to wear SCD S on your legs or feet. These are wraps connected to a machine that pumps in air and releases it. The repeated pumping helps prevent blood clots from forming.

## 2019-07-01 NOTE — ANESTHESIA PREPROCEDURE EVALUATION
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  Donating to brother. [facility].     CYSTOSCOPY  10/24/2011    Procedure:CYSTOSCOPY; Surgeon:NAYELY MADRIGAL; Location:[facility] OR     HC BRAIN MAPPING, 1ST HOUR MD ATTENDANCE      ablation of seizure focus     HC REDUCTION OF LARGE BREAST       HYSTERECTOMY VAGINAL  10/24/2011    Procedure:HYSTERECTOMY VAGINAL; vaginal hysterectomy and cystoscopy; Surgeon:NAYELY MADRIGAL; Location:[facility] OR     HYSTEROSCOPY,ABLATION ENDOMETRIUM            Anesthesia Evaluation     . Pt has had prior anesthetic. Type: General    History of anesthetic complications   - difficult intubation and PONV  difficult intubation in medical history. Anesthesia in  no difficulties noted      ROS/MED HX    ENT/Pulmonary:     (+)SHEILA risk factors obese, , . .   (-) tobacco use   Neurologic:     (+)seizures last seizure: last seizure  s/p brain mapping surgery     Cardiovascular:         METS/Exercise Tolerance:  4 - Raking leaves, gardening   Hematologic:     (+) Anemia, -      Musculoskeletal:  - neg musculoskeletal ROS       GI/Hepatic:     (+) GERD Asymptomatic on medication,       Renal/Genitourinary: Comment: Donated 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   CKD stage 3  Creat 1.17  GFR 55    (+) chronic renal disease, Pt does not require dialysis,       Endo:     (+) Obesity, .      Psychiatric:  - neg  "psychiatric ROS       Infectious Disease:  - neg infectious disease ROS       Malignancy:         Other:                         PHYSICAL EXAM:   Mental Status/Neuro: A/A/O   Airway: Facies: Feasible  Mallampati: II  Mouth/Opening: Full  TM distance: > 6 cm  Neck ROM: Full   Respiratory: Auscultation: CTAB     Resp. Rate: Normal     Resp. Effort: Normal      CV: Rhythm: Regular  Rate: Age appropriate  Heart: Normal Sounds   Comments:      Dental:  Dental Comments: Back molars chipped, one missing left tooth                Lab Results   Component Value Date    WBC 9.6 06/10/2019    HGB 13.1 06/10/2019    HCT 40.7 06/10/2019     06/10/2019    .5 (H) 01/13/2014    SED 38 (H) 01/11/2010     06/10/2019    POTASSIUM 3.7 06/10/2019    CHLORIDE 108 06/10/2019    CO2 26 06/10/2019    BUN 13 06/10/2019    CR 1.17 (H) 06/10/2019    GLC 89 06/10/2019    VINI 8.5 06/10/2019    MAG 2.2 06/10/2019    ALBUMIN 3.3 (L) 06/10/2019    PROTTOTAL 7.8 06/10/2019    ALT 17 06/10/2019    AST 12 06/10/2019    ALKPHOS 94 06/10/2019    BILITOTAL 0.2 06/10/2019    LIPASE 521 (H) 04/27/2009    AMYLASE 126 (H) 04/27/2009    PTT 34 04/01/2009    INR 0.99 04/01/2009    TSH 2.98 06/10/2019    HCG Negative 10/24/2011       Preop Vitals  BP Readings from Last 3 Encounters:   06/28/19 129/88   06/25/19 (!) 135/91   06/10/19 126/88    Pulse Readings from Last 3 Encounters:   06/28/19 89   06/25/19 105   06/10/19 90      Resp Readings from Last 3 Encounters:   06/28/19 16   06/10/19 16   04/12/16 16    SpO2 Readings from Last 3 Encounters:   06/28/19 95%   06/25/19 99%   06/10/19 97%      Temp Readings from Last 1 Encounters:   06/28/19 98.3  F (36.8  C) (Oral)    Ht Readings from Last 1 Encounters:   06/28/19 1.651 m (5' 5\")      Wt Readings from Last 1 Encounters:   06/28/19 98 kg (216 lb)    Estimated body mass index is 35.94 kg/m  as calculated from the following:    Height as of 6/28/19: 1.651 m (5' 5\").    Weight as of 6/28/19: " 98 kg (216 lb).     LDA:  Peripheral IV 06/21/19 Left Upper forearm (Active)   Number of days: 10            Assessment:   ASA SCORE: 2       Documentation: H&P complete; Preop Testing complete; Consents complete   Proceeding: Proceed without further delay  Tobacco Use:  NO Active use of Tobacco/UNKNOWN Tobacco use status     Plan:   Anes. Type:  General   Pre-Induction: Midazolam IV; Acetaminophen PO   Induction:  IV (Standard); Propofol; Rocuronium   Airway: Oral ETT   Access/Monitoring: PIV; 2nd PIV   Maintenance: Balanced; Propofol   Emergence: Procedure Site   Logistics: Same Day Surgery     Postop Pain/Sedation Strategy:  Standard-Options: Opioids PRN     PONV Management:  Adult Risk Factors: Female, Non-Smoker, Postop Opioids  Prevention: Ondansetron; Scop. Patch     CONSENT: Direct conversation   Plan and risks discussed with: Patient   Blood Products: Consented (ALL Blood Products)       Comments for Plan/Consent:  History of difficult intubation is difficult to understand.  Very ordinary airway with great mouth opening and neck extension.  Will proceed with adjuncts but will first attempt with DL for addressing airway management..  Jesse                  PAC Discussion and Assessment    ASA Classification: 3  Case is suitable for: [place]  Anesthetic techniques and relevant risks discussed: GA  Invasive monitoring and risk discussed: Yes  Types:   Possibility and Risk of blood transfusion discussed: Yes  NPO instructions given:   Additional anesthetic preparation and risks discussed:   Needs early admission to pre-op area:   Other:     PAC Resident/NP Anesthesia Assessment:  Sailaja Mcgee is a  49 year old female scheduled for Exploratory Laparotomy, Total Abdominal Hysterectomy, Removal Of Both Tubes And Ovaries, Possible Cancer Staging, Possible Tumor Debulking  on 7/15/2019 by Dr. Soto in treatment of pelvic mass. PAC referral for risk assessment and optimization for anesthesia with comorbid  conditions of the following:  Pre-operative considerations:  1. Cardiac: Functional status- METS 4. intermediate risk surgery with RCRI: 0.4% risk of major adverse cardiac event. Denies CP or [place] no reported cardiac history.  2. Pulm: Airway feasible. SHEILA # 3/8 of risks. Denies SOB or any other pulmonary history.  3. GI: Risk of PONV score = 2. If > 2, anti-emetic intervention recommended. PMH mentions difficult intubation and spinal Headache. Last surgical/anesthesia records shows no complications with intubation or post-operatively.  + GERD takes PPI  4. Heme- hx of anemia. Takes vitamins with iron supplements. Hgb 13.1. Iron 31, binding capacity 272, Ferritin 119  VTE risk: 1.8%  5. Renal: CKD stage 3. Latest creat 1.17 GFR 55.  Donor left Kidney to brother ( 2009)  6. Neuro: hx of seizures, no seizure activity since brain mapping surgery in 2001  Patient is optimized and is acceptable candidate for the proposed procedure. No further diagnostic evaluation is needed.  Patient also discussed with Dr. Molina. See recommendations below.  For further details of assessment, testing, and physical exam please see H and P completed on same date.    Reviewed and Signed by PAC Mid-Level Provider/Resident  Mid-Level Provider/Resident: Chelle JASON CNP  Date: 7/1/2019  Time:     Attending Anesthesiologist Anesthesia Assessment:        Anesthesiologist:   Date:   Time:   Pass/Fail:   Disposition:     PAC Pharmacist Assessment:        Pharmacist:   Date:   Time:        EREN Carcamo CNP

## 2019-07-02 ENCOUNTER — TELEPHONE (OUTPATIENT)
Dept: GASTROENTEROLOGY | Facility: CLINIC | Age: 49
End: 2019-07-02

## 2019-07-02 DIAGNOSIS — R19.00 PELVIC MASS: Primary | ICD-10-CM

## 2019-07-02 NOTE — NURSING NOTE
Pre Op Nurse Teaching Template    Relevant Diagnosis: pelvic mass    Teaching Topic: exploratory laparotomy  Removal of tubes and ovaries possible cancer staging  And debulking    Person(s) involved in teaching :  Patient  & significant other  Motivation Level:  Asks Questions:    Yes      Eager to Learn:     Yes     Cooperative:          Yes    Receptive (willing. Able to accept information):    Yes      Patient and those who are listed above demonstrates understanding of the following:   Reason for the appointment, diagnosis and treatment plan:   Yes   Knowledge of proper use of medications and conditions for which they are ordered (with special attention to potential side effects or drug interactions): Yes   Which situations necessitate calling provider and whom to contact: Yes         Nutritional needs and diet plan:  Yes      Pain management techniques:     Yes, Pain Scale   Diet:   Yes, [facility] Diet Instructions    Teaching Concerns addressed: Yes    Infection Prevention:  Patient and those who are listed above demonstrate understanding of the following:  Pre-Op CHG Bathing Instructions: Yes  Surgical procedure site care taught:   Yes   Signs and symptoms of infection taught: Yes       Instructional Materials Used/Given:  The [place] Before [facility] Surgery Booklet  Showering or Bathing before Surgery Instructions & CHG Product  Hysterectomy Guidelines  Pain Assessment Tool   Home Care after Major Abdominal or Vaginal Surgery  Map  Accommodations Brochure  Phone numbers for [facility] and [facility]  Copy of Surgical Consent    Done Today:  Lab work, EKG, Chest Xray,   Preop Visit needed/not needed   Tests Ordered: colonoscopy  Post Op Visit Scheduled:8/6  Comments:    Surgery date/time:7/15    Consent to file in medical records  .

## 2019-07-02 NOTE — TELEPHONE ENCOUNTER
Patient Name: Sailaja Mcgee   : 1970  MRN: [id number]       : [x] N/A   [] Yes:  Language /  ID:     VM with request pt contact Endoscopy Pre-assessment RN to review upcoming procedure information.  Telephone call-back number provided.    Morenita Ruiz RN  [facility]    Additional Information regarding appointment:      Patient scheduled for:  [] EGD  [x] Colonoscopy  [] EUS  [] Flex Sig   [] Other:     Indication for procedure. [] Screening   [x] coPelvic mass    Sedation Type: [x] Conscious Sedation   [] MAC   [] None    Procedure Provider:  Lam Jones      Referring Provider. Ivy Soto; Zi Recinos; Suellen Millard    Arrival time verified: Wed / 7/10/19 / 1130    Facility location verified:   [facility] - [address], 1st Floor, [address]    Pt meets medical necessity for outpatient procedure in hospital Endoscopy Unit:   [x] [facility] [place], 5th floor     [][facility] OR - [address], 3rd Floor Surgery check-in      Prep Type:   [x]Golytely eRx: (not sent) ;  [] MoviPrep: , [] MiraLax: , [] Other:   []NPO /p [place], No solid food /p 2200 the night before    Anticoagulants or blood thinners: []None [] ASA 81mg  - may continue           [] Warfarin   [] Warfarin + Lovenox bridge [] Plavix [] Effient [] Eliquis         [] Xarelto  [] Brilinta [x] NSAIDS  [] Other    LAST anticoagulant dose: Date/Time:   INR:     Electronic implanted devices: [x] No  [] IPG  []  ICD  []  LVAD  []     H&P / Pre op physical completed: [x] N/A, [] Complete, Date , [] Scheduled, Date , [] No,     Additional Information: Instructions mailed 19 - No email or Proxsyshart.    _______________________________________________

## 2019-07-03 RX ORDER — BISACODYL 5 MG/1
10 TABLET, DELAYED RELEASE ORAL DAILY
Qty: 4 TABLET | Refills: 0 | Status: ON HOLD | OUTPATIENT
Start: 2019-07-03 | End: 2019-07-17

## 2019-07-03 NOTE — TELEPHONE ENCOUNTER
VM with request pt contact Endoscopy Pre-assessment RN to review upcoming procedure information.  Information needed regarding pre-assessment screening included.  Telephone call-back number provided.     Morenita Ruiz RN  [facility] Endoscopy

## 2019-07-03 NOTE — TELEPHONE ENCOUNTER
Patient scheduled for:  [] EGD  [x] Colonoscopy  [] EUS  [] Flex Sig   [] Other:     Indication for procedure. [] Screening   [x] Pelvic mass    Sedation Type: [x] Conscious Sedation   [] MAC   [] None    Procedure Provider:  Lam Jones                          Referring Provider. Ivy Soto; Zi Recinos; Suellen Millard    Arrival time verified: Wed / 7/10/19 / 1130    Facility location verified:   [][facility] - [address], 1st Floor, [address]    Pt meets medical necessity for outpatient procedure in hospital Endoscopy Unit:   [x] [facility] [place], 5th floor      [][facility] OR - [address], 3rd Floor Surgery check-in       Prep Type:   [x]Golytely eRx:[facility] - [place], [place] - [address];  [] MoviPrep: , [] MiraLax: , [] Other:   []NPO /p [place], No solid food /p 2200 the night before    Anticoagulants or blood thinners: []None [] ASA 81mg  - may continue           [] Warfarin   [] Warfarin + Lovenox bridge [] Plavix [] Effient [] Eliquis         [] Xarelto  [] Brilinta [x] NSAIDS  [] Other    LAST anticoagulant dose: Date/Time:   INR:     Electronic implanted devices: [x] No  [] IPG  []  ICD  []  LVAD  []     H&P / Pre op physical completed: [x] N/A, [] Complete, Date , [] Scheduled, Date , [] No,     Additional Information: Instructions mailed 7/2/19 - No email or PartSimple.  _______________________________________________      Instructions given: [] Rec'd & Read   [x] Reviewed         [] Resent via PartSimple -      [] Resent via eMail -       Pre procedure teaching completed: [x] Yes - Reviewed, [] No,     [x] No questions regarding Sedation as ordered, []     Transportation from procedure & responsible adult to be with patient following procedure for a minimum of 6 hrs (Conscious Sedation) 24 hrs (MAC): [x] Yes  - confirmed will have post-procedure companionship as required, [] Pending , [] No     Morenita Ruiz RN  [facility]/[place]  Endoscopy

## 2019-07-10 ENCOUNTER — RESEARCH ENCOUNTER (OUTPATIENT)
Dept: ONCOLOGY | Facility: CLINIC | Age: 49
End: 2019-07-10

## 2019-07-10 ENCOUNTER — HOSPITAL ENCOUNTER (OUTPATIENT)
Facility: AMBULATORY SURGERY CENTER | Age: 49
End: 2019-07-10
Attending: INTERNAL MEDICINE

## 2019-07-10 VITALS
DIASTOLIC BLOOD PRESSURE: 72 MMHG | SYSTOLIC BLOOD PRESSURE: 115 MMHG | TEMPERATURE: 98.1 F | RESPIRATION RATE: 16 BRPM | HEART RATE: 77 BPM | OXYGEN SATURATION: 96 %

## 2019-07-10 LAB — COLONOSCOPY: NORMAL

## 2019-07-10 RX ORDER — ONDANSETRON 2 MG/ML
4 INJECTION INTRAMUSCULAR; INTRAVENOUS
Status: DISCONTINUED | OUTPATIENT
Start: 2019-07-10 | End: 2019-07-11 | Stop reason: HOSPADM

## 2019-07-10 RX ORDER — FENTANYL CITRATE 50 UG/ML
INJECTION, SOLUTION INTRAMUSCULAR; INTRAVENOUS PRN
Status: DISCONTINUED | OUTPATIENT
Start: 2019-07-10 | End: 2019-07-10 | Stop reason: HOSPADM

## 2019-07-10 RX ORDER — LIDOCAINE 40 MG/G
CREAM TOPICAL
Status: DISCONTINUED | OUTPATIENT
Start: 2019-07-10 | End: 2019-07-11 | Stop reason: HOSPADM

## 2019-07-10 RX ORDER — SIMETHICONE
LIQUID (ML) MISCELLANEOUS PRN
Status: DISCONTINUED | OUTPATIENT
Start: 2019-07-10 | End: 2019-07-10 | Stop reason: HOSPADM

## 2019-07-10 NOTE — PATIENT INSTRUCTIONS
Surgery scheduled on 7/15/19  PAC and colonoscopy appt to be scheduled prior to surgery   CT chest scheduled, you will be notified with results

## 2019-07-10 NOTE — PROGRESS NOTES
[id number]: Informed Consent Note     The consent form, including purpose, risks and benefits, was reviewed with Sailaja Mcgee, and all questions were answered before she signed the consent form. The patient understands that the study involves an active treatment phase as well as a post-treatment follow up phase.     Present during the discussion was the patient's significant other. A copy of the signed form was provided to the patient. No procedures specific to this study were performed prior to the patient signing the consent form.    Consent Version Date: 07 DEC 2018  Consent obtained by: Allan Pereira    Date: 06/28/2019  HIPAA authorization signed?: yes  HIPAA authorization version date: 21 NOV 2018    Allan Grad    Form 503.03.01 (Version 2)     Effective date: 01AUG2018     Next Review Date: 01AUG2020  :  Nelly Stacy  Phone: [phone]  Primary Clinical Research Coordinator:  Ava Small  Phone: [phone]  Pager: [phone]

## 2019-07-15 ENCOUNTER — ANESTHESIA (OUTPATIENT)
Dept: SURGERY | Facility: CLINIC | Age: 49
DRG: 737 | End: 2019-07-15

## 2019-07-15 ENCOUNTER — SURGERY (OUTPATIENT)
Age: 49
End: 2019-07-15

## 2019-07-15 ENCOUNTER — HOSPITAL ENCOUNTER (INPATIENT)
Facility: CLINIC | Age: 49
LOS: 3 days | Discharge: HOME | DRG: 737 | End: 2019-07-18
Attending: OBSTETRICS & GYNECOLOGY | Admitting: OBSTETRICS & GYNECOLOGY

## 2019-07-15 DIAGNOSIS — R19.00 PELVIC MASS: ICD-10-CM

## 2019-07-15 DIAGNOSIS — Z90.722 S/P BSO (BILATERAL SALPINGO-OOPHORECTOMY): Primary | ICD-10-CM

## 2019-07-15 PROBLEM — T88.4XXA HARD TO INTUBATE: Status: ACTIVE | Noted: 2019-07-15

## 2019-07-15 LAB
ABO + RH BLD: NORMAL
ABO + RH BLD: NORMAL
BASE DEFICIT BLDA-SCNC: 1.4 MMOL/L
BLD GP AB SCN SERPL QL: NORMAL
BLOOD BANK CMNT PATIENT-IMP: NORMAL
BLOOD BANK CMNT PATIENT-IMP: NORMAL
CA-I BLD-MCNC: 4.7 MG/DL (ref 4.4–5.2)
CREAT SERPL-MCNC: 1.02 MG/DL (ref 0.52–1.04)
GFR SERPL CREATININE-BSD FRML MDRD: 65 ML/MIN/{1.73_M2}
GLUCOSE BLD-MCNC: 123 MG/DL (ref 70–99)
GLUCOSE BLDC GLUCOMTR-MCNC: 90 MG/DL (ref 70–99)
HCO3 BLD-SCNC: 23 MMOL/L (ref 21–28)
HGB BLD-MCNC: 11 G/DL (ref 11.7–15.7)
LACTATE BLD-SCNC: 1 MMOL/L (ref 0.7–2)
O2/TOTAL GAS SETTING VFR VENT: 58 %
PCO2 BLD: 39 MM HG (ref 35–45)
PH BLD: 7.39 PH (ref 7.35–7.45)
PO2 BLD: 113 MM HG (ref 80–105)
POTASSIUM BLD-SCNC: 4 MMOL/L (ref 3.4–5.3)
POTASSIUM SERPL-SCNC: 4.3 MMOL/L (ref 3.4–5.3)
SODIUM BLD-SCNC: 138 MMOL/L (ref 133–144)
SPECIMEN EXP DATE BLD: NORMAL

## 2019-07-15 PROCEDURE — C9290 INJ, BUPIVACAINE LIPOSOME: HCPCS | Performed by: ANESTHESIOLOGY

## 2019-07-15 PROCEDURE — 40000170 ZZH STATISTIC PRE-PROCEDURE ASSESSMENT II: Performed by: OBSTETRICS & GYNECOLOGY

## 2019-07-15 PROCEDURE — 37000009 ZZH ANESTHESIA TECHNICAL FEE, EACH ADDTL 15 MIN: Performed by: OBSTETRICS & GYNECOLOGY

## 2019-07-15 PROCEDURE — 88305 TISSUE EXAM BY PATHOLOGIST: CPT | Performed by: OBSTETRICS & GYNECOLOGY

## 2019-07-15 PROCEDURE — 36415 COLL VENOUS BLD VENIPUNCTURE: CPT | Performed by: OBSTETRICS & GYNECOLOGY

## 2019-07-15 PROCEDURE — 0DBW0ZX EXCISION OF PERITONEUM, OPEN APPROACH, DIAGNOSTIC: ICD-10-PCS | Performed by: OBSTETRICS & GYNECOLOGY

## 2019-07-15 PROCEDURE — 25000128 H RX IP 250 OP 636: Performed by: NURSE ANESTHETIST, CERTIFIED REGISTERED

## 2019-07-15 PROCEDURE — 0DBU0ZZ EXCISION OF OMENTUM, OPEN APPROACH: ICD-10-PCS | Performed by: OBSTETRICS & GYNECOLOGY

## 2019-07-15 PROCEDURE — 58952 RESECT OVARIAN MALIGNANCY: CPT | Mod: GC | Performed by: OBSTETRICS & GYNECOLOGY

## 2019-07-15 PROCEDURE — 88112 CYTOPATH CELL ENHANCE TECH: CPT | Performed by: OBSTETRICS & GYNECOLOGY

## 2019-07-15 PROCEDURE — 25000131 ZZH RX MED GY IP 250 OP 636 PS 637: Performed by: STUDENT IN AN ORGANIZED HEALTH CARE EDUCATION/TRAINING PROGRAM

## 2019-07-15 PROCEDURE — 0DTJ0ZZ RESECTION OF APPENDIX, OPEN APPROACH: ICD-10-PCS | Performed by: OBSTETRICS & GYNECOLOGY

## 2019-07-15 PROCEDURE — P9041 ALBUMIN (HUMAN),5%, 50ML: HCPCS | Performed by: NURSE ANESTHETIST, CERTIFIED REGISTERED

## 2019-07-15 PROCEDURE — 25000128 H RX IP 250 OP 636: Performed by: ANESTHESIOLOGY

## 2019-07-15 PROCEDURE — 25000125 ZZHC RX 250: Performed by: NURSE ANESTHETIST, CERTIFIED REGISTERED

## 2019-07-15 PROCEDURE — 25000128 H RX IP 250 OP 636: Performed by: OBSTETRICS & GYNECOLOGY

## 2019-07-15 PROCEDURE — 12000001 ZZH R&B MED SURG/OB UMMC

## 2019-07-15 PROCEDURE — 25800030 ZZH RX IP 258 OP 636: Performed by: ANESTHESIOLOGY

## 2019-07-15 PROCEDURE — 40000014 ZZH STATISTIC ARTERIAL MONITORING DAILY

## 2019-07-15 PROCEDURE — 25000132 ZZH RX MED GY IP 250 OP 250 PS 637: Performed by: STUDENT IN AN ORGANIZED HEALTH CARE EDUCATION/TRAINING PROGRAM

## 2019-07-15 PROCEDURE — 84132 ASSAY OF SERUM POTASSIUM: CPT | Performed by: OBSTETRICS & GYNECOLOGY

## 2019-07-15 PROCEDURE — 0UT70ZZ RESECTION OF BILATERAL FALLOPIAN TUBES, OPEN APPROACH: ICD-10-PCS | Performed by: OBSTETRICS & GYNECOLOGY

## 2019-07-15 PROCEDURE — 0DJD8ZZ INSPECTION OF LOWER INTESTINAL TRACT, VIA NATURAL OR ARTIFICIAL OPENING ENDOSCOPIC: ICD-10-PCS | Performed by: OBSTETRICS & GYNECOLOGY

## 2019-07-15 PROCEDURE — 84295 ASSAY OF SERUM SODIUM: CPT | Performed by: OBSTETRICS & GYNECOLOGY

## 2019-07-15 PROCEDURE — 36000062 ZZH SURGERY LEVEL 4 1ST 30 MIN - UMMC: Performed by: OBSTETRICS & GYNECOLOGY

## 2019-07-15 PROCEDURE — 44955 APPENDECTOMY ADD-ON: CPT | Mod: 59 | Performed by: OBSTETRICS & GYNECOLOGY

## 2019-07-15 PROCEDURE — 88309 TISSUE EXAM BY PATHOLOGIST: CPT | Performed by: OBSTETRICS & GYNECOLOGY

## 2019-07-15 PROCEDURE — 88331 PATH CONSLTJ SURG 1 BLK 1SPC: CPT | Performed by: OBSTETRICS & GYNECOLOGY

## 2019-07-15 PROCEDURE — 37000008 ZZH ANESTHESIA TECHNICAL FEE, 1ST 30 MIN: Performed by: OBSTETRICS & GYNECOLOGY

## 2019-07-15 PROCEDURE — 25800030 ZZH RX IP 258 OP 636: Performed by: STUDENT IN AN ORGANIZED HEALTH CARE EDUCATION/TRAINING PROGRAM

## 2019-07-15 PROCEDURE — 25000132 ZZH RX MED GY IP 250 OP 250 PS 637: Performed by: NURSE ANESTHETIST, CERTIFIED REGISTERED

## 2019-07-15 PROCEDURE — 40000275 ZZH STATISTIC RCP TIME EA 10 MIN

## 2019-07-15 PROCEDURE — 25800030 ZZH RX IP 258 OP 636: Performed by: NURSE ANESTHETIST, CERTIFIED REGISTERED

## 2019-07-15 PROCEDURE — 71000015 ZZH RECOVERY PHASE 1 LEVEL 2 EA ADDTL HR: Performed by: OBSTETRICS & GYNECOLOGY

## 2019-07-15 PROCEDURE — 00000155 ZZHCL STATISTIC H-CELL BLOCK W/STAIN: Performed by: OBSTETRICS & GYNECOLOGY

## 2019-07-15 PROCEDURE — 82565 ASSAY OF CREATININE: CPT | Performed by: OBSTETRICS & GYNECOLOGY

## 2019-07-15 PROCEDURE — 25000565 ZZH ISOFLURANE, EA 15 MIN: Performed by: OBSTETRICS & GYNECOLOGY

## 2019-07-15 PROCEDURE — 83605 ASSAY OF LACTIC ACID: CPT | Performed by: OBSTETRICS & GYNECOLOGY

## 2019-07-15 PROCEDURE — 88304 TISSUE EXAM BY PATHOLOGIST: CPT | Performed by: OBSTETRICS & GYNECOLOGY

## 2019-07-15 PROCEDURE — 71000014 ZZH RECOVERY PHASE 1 LEVEL 2 FIRST HR: Performed by: OBSTETRICS & GYNECOLOGY

## 2019-07-15 PROCEDURE — 00000146 ZZHCL STATISTIC GLUCOSE BY METER IP

## 2019-07-15 PROCEDURE — 82330 ASSAY OF CALCIUM: CPT | Performed by: OBSTETRICS & GYNECOLOGY

## 2019-07-15 PROCEDURE — 82803 BLOOD GASES ANY COMBINATION: CPT | Performed by: OBSTETRICS & GYNECOLOGY

## 2019-07-15 PROCEDURE — 36000064 ZZH SURGERY LEVEL 4 EA 15 ADDTL MIN - UMMC: Performed by: OBSTETRICS & GYNECOLOGY

## 2019-07-15 PROCEDURE — 27210794 ZZH OR GENERAL SUPPLY STERILE: Performed by: OBSTETRICS & GYNECOLOGY

## 2019-07-15 PROCEDURE — 82947 ASSAY GLUCOSE BLOOD QUANT: CPT | Performed by: OBSTETRICS & GYNECOLOGY

## 2019-07-15 PROCEDURE — 00000102 ZZHCL STATISTIC CYTO WRIGHT STAIN TC: Performed by: OBSTETRICS & GYNECOLOGY

## 2019-07-15 PROCEDURE — 0UT20ZZ RESECTION OF BILATERAL OVARIES, OPEN APPROACH: ICD-10-PCS | Performed by: OBSTETRICS & GYNECOLOGY

## 2019-07-15 PROCEDURE — 88307 TISSUE EXAM BY PATHOLOGIST: CPT | Performed by: OBSTETRICS & GYNECOLOGY

## 2019-07-15 RX ORDER — CEFAZOLIN SODIUM 2 G/100ML
2 INJECTION, SOLUTION INTRAVENOUS
Status: COMPLETED | OUTPATIENT
Start: 2019-07-15 | End: 2019-07-15

## 2019-07-15 RX ORDER — NALOXONE HYDROCHLORIDE 0.4 MG/ML
.1-.4 INJECTION, SOLUTION INTRAMUSCULAR; INTRAVENOUS; SUBCUTANEOUS
Status: ACTIVE | OUTPATIENT
Start: 2019-07-15 | End: 2019-07-16

## 2019-07-15 RX ORDER — FLUMAZENIL 0.1 MG/ML
0.2 INJECTION, SOLUTION INTRAVENOUS
Status: DISCONTINUED | OUTPATIENT
Start: 2019-07-15 | End: 2019-07-15 | Stop reason: HOSPADM

## 2019-07-15 RX ORDER — ONDANSETRON 2 MG/ML
4 INJECTION INTRAMUSCULAR; INTRAVENOUS EVERY 30 MIN PRN
Status: DISCONTINUED | OUTPATIENT
Start: 2019-07-15 | End: 2019-07-15 | Stop reason: HOSPADM

## 2019-07-15 RX ORDER — SODIUM CHLORIDE, SODIUM LACTATE, POTASSIUM CHLORIDE, CALCIUM CHLORIDE 600; 310; 30; 20 MG/100ML; MG/100ML; MG/100ML; MG/100ML
INJECTION, SOLUTION INTRAVENOUS CONTINUOUS
Status: DISCONTINUED | OUTPATIENT
Start: 2019-07-15 | End: 2019-07-15 | Stop reason: HOSPADM

## 2019-07-15 RX ORDER — DEXAMETHASONE SODIUM PHOSPHATE 4 MG/ML
INJECTION, SOLUTION INTRA-ARTICULAR; INTRALESIONAL; INTRAMUSCULAR; INTRAVENOUS; SOFT TISSUE PRN
Status: DISCONTINUED | OUTPATIENT
Start: 2019-07-15 | End: 2019-07-15

## 2019-07-15 RX ORDER — DIPHENHYDRAMINE HYDROCHLORIDE 50 MG/ML
25 INJECTION INTRAMUSCULAR; INTRAVENOUS EVERY 6 HOURS PRN
Status: DISCONTINUED | OUTPATIENT
Start: 2019-07-15 | End: 2019-07-18 | Stop reason: HOSPADM

## 2019-07-15 RX ORDER — LIDOCAINE HYDROCHLORIDE 20 MG/ML
INJECTION, SOLUTION INFILTRATION; PERINEURAL PRN
Status: DISCONTINUED | OUTPATIENT
Start: 2019-07-15 | End: 2019-07-15

## 2019-07-15 RX ORDER — ONDANSETRON 4 MG/1
4 TABLET, ORALLY DISINTEGRATING ORAL EVERY 8 HOURS
Status: DISPENSED | OUTPATIENT
Start: 2019-07-15 | End: 2019-07-16

## 2019-07-15 RX ORDER — LIDOCAINE 40 MG/G
CREAM TOPICAL
Status: DISCONTINUED | OUTPATIENT
Start: 2019-07-15 | End: 2019-07-15 | Stop reason: HOSPADM

## 2019-07-15 RX ORDER — PROPOFOL 10 MG/ML
INJECTION, EMULSION INTRAVENOUS CONTINUOUS PRN
Status: DISCONTINUED | OUTPATIENT
Start: 2019-07-15 | End: 2019-07-15

## 2019-07-15 RX ORDER — FENTANYL CITRATE 50 UG/ML
25-50 INJECTION, SOLUTION INTRAMUSCULAR; INTRAVENOUS
Status: DISCONTINUED | OUTPATIENT
Start: 2019-07-15 | End: 2019-07-15 | Stop reason: HOSPADM

## 2019-07-15 RX ORDER — AMOXICILLIN 250 MG
1 CAPSULE ORAL 2 TIMES DAILY PRN
Status: DISCONTINUED | OUTPATIENT
Start: 2019-07-15 | End: 2019-07-18 | Stop reason: HOSPADM

## 2019-07-15 RX ORDER — ALBUTEROL SULFATE 90 UG/1
AEROSOL, METERED RESPIRATORY (INHALATION) PRN
Status: DISCONTINUED | OUTPATIENT
Start: 2019-07-15 | End: 2019-07-15

## 2019-07-15 RX ORDER — FENTANYL CITRATE 50 UG/ML
INJECTION, SOLUTION INTRAMUSCULAR; INTRAVENOUS PRN
Status: DISCONTINUED | OUTPATIENT
Start: 2019-07-15 | End: 2019-07-15

## 2019-07-15 RX ORDER — ONDANSETRON 2 MG/ML
INJECTION INTRAMUSCULAR; INTRAVENOUS PRN
Status: DISCONTINUED | OUTPATIENT
Start: 2019-07-15 | End: 2019-07-15

## 2019-07-15 RX ORDER — CEFAZOLIN SODIUM 1 G/3ML
1 INJECTION, POWDER, FOR SOLUTION INTRAMUSCULAR; INTRAVENOUS SEE ADMIN INSTRUCTIONS
Status: DISCONTINUED | OUTPATIENT
Start: 2019-07-15 | End: 2019-07-15 | Stop reason: HOSPADM

## 2019-07-15 RX ORDER — OXYCODONE HYDROCHLORIDE 5 MG/1
5-10 TABLET ORAL
Status: DISCONTINUED | OUTPATIENT
Start: 2019-07-15 | End: 2019-07-18 | Stop reason: HOSPADM

## 2019-07-15 RX ORDER — LIDOCAINE 40 MG/G
CREAM TOPICAL
Status: DISCONTINUED | OUTPATIENT
Start: 2019-07-15 | End: 2019-07-18 | Stop reason: HOSPADM

## 2019-07-15 RX ORDER — ONDANSETRON 4 MG/1
4 TABLET, ORALLY DISINTEGRATING ORAL EVERY 8 HOURS PRN
Status: DISCONTINUED | OUTPATIENT
Start: 2019-07-16 | End: 2019-07-18 | Stop reason: HOSPADM

## 2019-07-15 RX ORDER — ONDANSETRON 4 MG/1
4 TABLET, ORALLY DISINTEGRATING ORAL EVERY 30 MIN PRN
Status: DISCONTINUED | OUTPATIENT
Start: 2019-07-15 | End: 2019-07-15 | Stop reason: HOSPADM

## 2019-07-15 RX ORDER — SODIUM CHLORIDE 9 MG/ML
INJECTION, SOLUTION INTRAVENOUS CONTINUOUS
Status: DISCONTINUED | OUTPATIENT
Start: 2019-07-15 | End: 2019-07-16

## 2019-07-15 RX ORDER — HYDROMORPHONE HCL/0.9% NACL/PF 0.2MG/0.2
0.2 SYRINGE (ML) INTRAVENOUS
Status: DISCONTINUED | OUTPATIENT
Start: 2019-07-15 | End: 2019-07-17

## 2019-07-15 RX ORDER — NALOXONE HYDROCHLORIDE 0.4 MG/ML
.1-.4 INJECTION, SOLUTION INTRAMUSCULAR; INTRAVENOUS; SUBCUTANEOUS
Status: DISCONTINUED | OUTPATIENT
Start: 2019-07-16 | End: 2019-07-18 | Stop reason: HOSPADM

## 2019-07-15 RX ORDER — AMOXICILLIN 250 MG
2 CAPSULE ORAL 2 TIMES DAILY PRN
Status: DISCONTINUED | OUTPATIENT
Start: 2019-07-15 | End: 2019-07-18 | Stop reason: HOSPADM

## 2019-07-15 RX ORDER — HYDROMORPHONE HYDROCHLORIDE 1 MG/ML
.3-.5 INJECTION, SOLUTION INTRAMUSCULAR; INTRAVENOUS; SUBCUTANEOUS EVERY 5 MIN PRN
Status: DISCONTINUED | OUTPATIENT
Start: 2019-07-15 | End: 2019-07-15 | Stop reason: HOSPADM

## 2019-07-15 RX ORDER — BUPIVACAINE HYDROCHLORIDE 2.5 MG/ML
INJECTION, SOLUTION EPIDURAL; INFILTRATION; INTRACAUDAL PRN
Status: DISCONTINUED | OUTPATIENT
Start: 2019-07-15 | End: 2019-07-15

## 2019-07-15 RX ORDER — EPHEDRINE SULFATE 50 MG/ML
INJECTION, SOLUTION INTRAMUSCULAR; INTRAVENOUS; SUBCUTANEOUS PRN
Status: DISCONTINUED | OUTPATIENT
Start: 2019-07-15 | End: 2019-07-15

## 2019-07-15 RX ORDER — BISACODYL 10 MG
10 SUPPOSITORY, RECTAL RECTAL DAILY
Status: DISCONTINUED | OUTPATIENT
Start: 2019-07-15 | End: 2019-07-18 | Stop reason: HOSPADM

## 2019-07-15 RX ORDER — DIPHENHYDRAMINE HCL 25 MG
25 CAPSULE ORAL EVERY 6 HOURS PRN
Status: DISCONTINUED | OUTPATIENT
Start: 2019-07-15 | End: 2019-07-18 | Stop reason: HOSPADM

## 2019-07-15 RX ORDER — SIMETHICONE 80 MG
80 TABLET,CHEWABLE ORAL 4 TIMES DAILY PRN
Status: DISCONTINUED | OUTPATIENT
Start: 2019-07-15 | End: 2019-07-18 | Stop reason: HOSPADM

## 2019-07-15 RX ORDER — ACETAMINOPHEN 325 MG/1
650 TABLET ORAL EVERY 6 HOURS
Status: DISCONTINUED | OUTPATIENT
Start: 2019-07-15 | End: 2019-07-18 | Stop reason: HOSPADM

## 2019-07-15 RX ORDER — NALOXONE HYDROCHLORIDE 0.4 MG/ML
.1-.4 INJECTION, SOLUTION INTRAMUSCULAR; INTRAVENOUS; SUBCUTANEOUS
Status: DISCONTINUED | OUTPATIENT
Start: 2019-07-15 | End: 2019-07-15 | Stop reason: HOSPADM

## 2019-07-15 RX ORDER — PROPOFOL 10 MG/ML
INJECTION, EMULSION INTRAVENOUS PRN
Status: DISCONTINUED | OUTPATIENT
Start: 2019-07-15 | End: 2019-07-15

## 2019-07-15 RX ORDER — ALBUMIN, HUMAN INJ 5% 5 %
SOLUTION INTRAVENOUS CONTINUOUS PRN
Status: DISCONTINUED | OUTPATIENT
Start: 2019-07-15 | End: 2019-07-15

## 2019-07-15 RX ADMIN — CEFAZOLIN 1 G: 1 INJECTION, POWDER, FOR SOLUTION INTRAMUSCULAR; INTRAVENOUS at 11:55

## 2019-07-15 RX ADMIN — FENTANYL CITRATE 50 MCG: 50 INJECTION, SOLUTION INTRAMUSCULAR; INTRAVENOUS at 09:52

## 2019-07-15 RX ADMIN — OXYCODONE HYDROCHLORIDE 5 MG: 5 TABLET ORAL at 18:38

## 2019-07-15 RX ADMIN — SODIUM CHLORIDE: 9 INJECTION, SOLUTION INTRAVENOUS at 21:43

## 2019-07-15 RX ADMIN — FENTANYL CITRATE 50 MCG: 50 INJECTION INTRAMUSCULAR; INTRAVENOUS at 14:07

## 2019-07-15 RX ADMIN — Medication 5 MG: at 10:51

## 2019-07-15 RX ADMIN — PROPOFOL 130 MG: 10 INJECTION, EMULSION INTRAVENOUS at 09:42

## 2019-07-15 RX ADMIN — FENTANYL CITRATE 25 MCG: 50 INJECTION, SOLUTION INTRAMUSCULAR; INTRAVENOUS at 10:09

## 2019-07-15 RX ADMIN — ALBUMIN HUMAN: 0.05 INJECTION, SOLUTION INTRAVENOUS at 11:10

## 2019-07-15 RX ADMIN — ROCURONIUM BROMIDE 40 MG: 10 INJECTION INTRAVENOUS at 09:42

## 2019-07-15 RX ADMIN — ONDANSETRON 4 MG: 4 TABLET, ORALLY DISINTEGRATING ORAL at 16:48

## 2019-07-15 RX ADMIN — ALBUMIN HUMAN: 0.05 INJECTION, SOLUTION INTRAVENOUS at 11:32

## 2019-07-15 RX ADMIN — ACETAMINOPHEN 650 MG: 325 TABLET, FILM COATED ORAL at 21:43

## 2019-07-15 RX ADMIN — ROCURONIUM BROMIDE 20 MG: 10 INJECTION INTRAVENOUS at 10:52

## 2019-07-15 RX ADMIN — ROCURONIUM BROMIDE 10 MG: 10 INJECTION INTRAVENOUS at 10:10

## 2019-07-15 RX ADMIN — DEXAMETHASONE SODIUM PHOSPHATE 6 MG: 4 INJECTION, SOLUTION INTRA-ARTICULAR; INTRALESIONAL; INTRAMUSCULAR; INTRAVENOUS; SOFT TISSUE at 10:41

## 2019-07-15 RX ADMIN — FENTANYL CITRATE 50 MCG: 50 INJECTION INTRAMUSCULAR; INTRAVENOUS at 13:40

## 2019-07-15 RX ADMIN — SODIUM CHLORIDE: 9 INJECTION, SOLUTION INTRAVENOUS at 13:33

## 2019-07-15 RX ADMIN — ALBUTEROL SULFATE 6 PUFF: 90 AEROSOL, METERED RESPIRATORY (INHALATION) at 12:46

## 2019-07-15 RX ADMIN — SODIUM CHLORIDE, POTASSIUM CHLORIDE, SODIUM LACTATE AND CALCIUM CHLORIDE: 600; 310; 30; 20 INJECTION, SOLUTION INTRAVENOUS at 10:00

## 2019-07-15 RX ADMIN — METRONIDAZOLE 500 MG: 500 INJECTION, SOLUTION INTRAVENOUS at 12:10

## 2019-07-15 RX ADMIN — LIDOCAINE HYDROCHLORIDE 80 MG: 20 INJECTION, SOLUTION INFILTRATION; PERINEURAL at 09:42

## 2019-07-15 RX ADMIN — FENTANYL CITRATE 75 MCG: 50 INJECTION, SOLUTION INTRAMUSCULAR; INTRAVENOUS at 09:42

## 2019-07-15 RX ADMIN — SODIUM CHLORIDE, POTASSIUM CHLORIDE, SODIUM LACTATE AND CALCIUM CHLORIDE: 600; 310; 30; 20 INJECTION, SOLUTION INTRAVENOUS at 11:01

## 2019-07-15 RX ADMIN — BUPIVACAINE HYDROCHLORIDE 20 ML: 2.5 INJECTION, SOLUTION EPIDURAL; INFILTRATION; INTRACAUDAL; PERINEURAL at 13:05

## 2019-07-15 RX ADMIN — ACETAMINOPHEN 650 MG: 325 TABLET, FILM COATED ORAL at 16:48

## 2019-07-15 RX ADMIN — BUPIVACAINE 20 ML: 13.3 INJECTION, SUSPENSION, LIPOSOMAL INFILTRATION at 13:05

## 2019-07-15 RX ADMIN — HYDROMORPHONE HYDROCHLORIDE 0.5 MG: 1 INJECTION, SOLUTION INTRAMUSCULAR; INTRAVENOUS; SUBCUTANEOUS at 14:40

## 2019-07-15 RX ADMIN — OXYCODONE HYDROCHLORIDE 10 MG: 5 TABLET ORAL at 20:14

## 2019-07-15 RX ADMIN — ONDANSETRON 4 MG: 2 INJECTION INTRAMUSCULAR; INTRAVENOUS at 12:27

## 2019-07-15 RX ADMIN — PHENYLEPHRINE HYDROCHLORIDE 100 MCG: 10 INJECTION INTRAVENOUS at 10:56

## 2019-07-15 RX ADMIN — PHENYLEPHRINE HYDROCHLORIDE 100 MCG: 10 INJECTION INTRAVENOUS at 11:10

## 2019-07-15 RX ADMIN — SUGAMMADEX 200 MG: 100 INJECTION, SOLUTION INTRAVENOUS at 12:53

## 2019-07-15 RX ADMIN — ROCURONIUM BROMIDE 10 MG: 10 INJECTION INTRAVENOUS at 11:33

## 2019-07-15 RX ADMIN — FENTANYL CITRATE 50 MCG: 50 INJECTION, SOLUTION INTRAMUSCULAR; INTRAVENOUS at 10:15

## 2019-07-15 RX ADMIN — MIDAZOLAM 2 MG: 1 INJECTION INTRAMUSCULAR; INTRAVENOUS at 09:32

## 2019-07-15 RX ADMIN — OXYCODONE HYDROCHLORIDE 5 MG: 5 TABLET ORAL at 16:48

## 2019-07-15 RX ADMIN — CEFAZOLIN SODIUM 2 G: 2 INJECTION, SOLUTION INTRAVENOUS at 09:55

## 2019-07-15 RX ADMIN — PROPOFOL 125 MCG/KG/MIN: 10 INJECTION, EMULSION INTRAVENOUS at 09:45

## 2019-07-15 RX ADMIN — SODIUM CHLORIDE, POTASSIUM CHLORIDE, SODIUM LACTATE AND CALCIUM CHLORIDE: 600; 310; 30; 20 INJECTION, SOLUTION INTRAVENOUS at 09:32

## 2019-07-15 RX ADMIN — ROCURONIUM BROMIDE 5 MG: 10 INJECTION INTRAVENOUS at 12:15

## 2019-07-15 RX ADMIN — HYDROMORPHONE HYDROCHLORIDE 0.5 MG: 1 INJECTION, SOLUTION INTRAMUSCULAR; INTRAVENOUS; SUBCUTANEOUS at 12:27

## 2019-07-15 RX ADMIN — MIDAZOLAM 1 MG: 1 INJECTION INTRAMUSCULAR; INTRAVENOUS at 11:33

## 2019-07-15 ASSESSMENT — PAIN DESCRIPTION - DESCRIPTORS
DESCRIPTORS: SORE
DESCRIPTORS: ACHING
DESCRIPTORS: SORE
DESCRIPTORS: ACHING
DESCRIPTORS: ACHING;CONSTANT
DESCRIPTORS: ACHING

## 2019-07-15 ASSESSMENT — ACTIVITIES OF DAILY LIVING (ADL)
ADLS_ACUITY_SCORE: 14
ADLS_ACUITY_SCORE: 14

## 2019-07-15 ASSESSMENT — MIFFLIN-ST. JEOR: SCORE: 1615.81

## 2019-07-15 NOTE — DISCHARGE SUMMARY
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  - s/p XL, BSO, omentectomy, appendectomy, peritoneal biopsies    Patient Active Problem List   Diagnosis     GERD (gastroesophageal reflux disease)     CARDIOVASCULAR SCREENING; LDL GOAL LESS THAN 160     Dysmenorrhea     Menorrhagia     Donor of kidney for transplant     Anemia     S/P laparoscopic hysterectomy     Obesity (BMI 35.0-39.9) with comorbidity (H)     CKD (chronic kidney disease) stage 3, GFR 30-59 ml/min (H)     Right lower quadrant abdominal mass     Difficult intubation     S/P BSO (bilateral salpingo-oophorectomy)     Procedures:  XL, BSO, omentectomy, appendectomy, peritoneal biopsies    Prior to Admission Medications:  Medications Prior to Admission   Medication Sig Dispense Refill Last Dose     multivitamin w/minerals (MULTI-VITAMIN) tablet Take 1 tablet by mouth every morning    Past Month at Unknown time     omeprazole (PRILOSEC) 10 MG DR capsule Take 40 mg by mouth every morning    7/15/2019 at 0600     ibuprofen (ADVIL/MOTRIN) 200 MG capsule Take 400 mg by mouth as needed for fever   More than a month at Unknown time     [DISCONTINUED] bisacodyl (DULCOLAX) 5 MG EC tablet Take 2 tablets (10 mg) by mouth daily for 2 doses 4 tablet 0      [DISCONTINUED] polyethylene glycol (GOLYTELY/NULYTELY) 236 g suspension Take 4,000 mLs (4 L) by mouth once for 1 dose 4000 mL 0      Discharge Medications:     Review of your medicines      START taking      Dose / Directions   acetaminophen 325 MG tablet  Commonly known as:  TYLENOL      Dose:  650 mg  Take 2 tablets (650 mg) by mouth every 6 hours  Quantity:  12  tablet  Refills:  0     oxyCODONE 5 MG tablet  Commonly known as:  ROXICODONE      Dose:  5-10 mg  Take 1-2 tablets (5-10 mg) by mouth every 3 hours as needed for pain  Quantity:  16 tablet  Refills:  0     senna-docusate 8.6-50 MG tablet  Commonly known as:  SENOKOT-S/PERICOLACE      Dose:  2 tablet  Take 2 tablets by mouth 2 times daily as needed for constipation  Quantity:  30 tablet  Refills:  0        CONTINUE these medicines which have NOT CHANGED      Dose / Directions   ibuprofen 200 MG capsule  Commonly known as:  ADVIL/MOTRIN      Dose:  400 mg  Take 400 mg by mouth as needed for fever  Refills:  0     Multi-vitamin tablet      Dose:  1 tablet  Take 1 tablet by mouth every morning  Refills:  0     omeprazole 10 MG DR capsule  Commonly known as:  priLOSEC  Indication:  Pt isn't sure if she is taking 20 or 40 mg a day      Dose:  40 mg  Take 40 mg by mouth every morning  Refills:  0        STOP taking    bisacodyl 5 MG EC tablet  Commonly known as:  DULCOLAX        polyethylene glycol 236 g suspension  Commonly known as:  GoLYTELY/NuLYTELY              Where to get your medicines      These medications were sent to [facility] [place], [place] - [address] [address]    Phone:  [phone]     acetaminophen 325 MG tablet    senna-docusate 8.6-50 MG tablet     Some of these will need a paper prescription and others can be bought over the counter. Ask your nurse if you have questions.    Bring a paper prescription for each of these medications    oxyCODONE 5 MG tablet       Consultations:   Anesthesia    Brief History of Illness:  Sailaja Mcgee is a 48YO  with a PMH of brain seizures (s/p brain surgery, no more seizures), CKD and L nephrectomy (donated to brother) who presented to Dr. Soto's clinic on 2019 for evaluation of a  pelvic mass. Approximately 2 months prior to presenting to clinic, Sailaja had noted worsening back and R leg pain  "which prompted CT/MRI. She also had associated symptoms of abdominal distention, abdominal hardness, \"contractions,\" early satiety, constipation, and urinary urgency. CT/MRI revealed moderate ascites and a large multiseptated cystic mass with thickened septae in the abdomen and pelvis concerning for possible ovarian malignancy. 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 Colonoscopy was obtained to rule out GI origin and was found to be normal. Patient underwent XL, BSO, omentectomy, appendectomy, peritoneal biopsies on 7/15/2019.     Hospital Course:  Dz:   - Preoperative diagnosis was pelvic mass concerning for malignancy.  Frozen section at the time of the surgery showed mucinous borderline tumor.  Final pathology is pending at the time of discharge.  She will follow-up postoperatively for a care plan.  FEN:   - She was maintained on IVF until POD#1, when her diet was slowly advanced . By discharge, she was tolerating a regular diet without nausea and vomiting and able to maintain her hydration without IVF supplementation.  Pain:   - Her pain was initially controlled on IV dilaudid.  Once tolerating PO pain meds, she was transitioned to a PO oxycodone and tylenol. Her pain was well controlled on this and she was discharged home with these medications.  CV:   - She has no history of CV issues.  Her vital signs were stable while in house and she had no acute CV issues.  PULM:   - She has no history of pulmonary issues.  She was initially given O2 supplementation in to maintain her O2 sats in the immediate postop period and was transitioned off of this without difficulty.  By discharge, her O2 sats were greater than 94% on RA.  She was encouraged to use her bedside IS while in house.  She had no acute pulmonary issues while in house.  HEME:   - Her preoperative Hgb was 11.0. Her hgb dropped to 8.3 postoperatively and was stable at 8.0 at the time of discharge.  She had " no other acute heme issues while in house.  GI:   - She was made NPO prior to the procedure. On POD#0, her diet was advanced to clear liquids and then advanced slowly as tolerated.  At the time of discharge, she was tolerating a regular diet without nausea and vomiting.  She will be discharged with a bowel regimen to prevent constipation in the postoperative period.  She had no acute GI issues while in house.  :    -  A batres catheter was placed at the time of the surgery.  Once ambulating unassisted, the batres catheter was removed.  Prior to discharge, the patient was voiding spontaneously without difficulty. Patient has a history of TVH (for endometriosis), L nephrectomy and CKD. Creatinine was 1.02 preoperatively and remained stable throughout hospitalization. She had no acute  issues while in house.  ID:   - The patient was AF during her hospitalization.  She received standard preoperative antibiotics without incident.    ENDO:   - no issues  PSYCH/NEURO:   - Patient has a h/o seizure disorder, s/p ablation of seizure focus, and she was not on any medications  PPX:    -  She was given SCDs, IS, PPI and lovenox during her hospital course.  She tolerated these prophylactic interventions without incident.  They were discontinued at the time of her discharge. We had originally planned to discharge the patient with 28 days of lovenox due to suspicion for malignancy despite frozen pathology showing borderline, but due to lack of insurance the patient was unwilling to pay out of pocket for the lovenox ($900). She was discharged without lovenox and will follow-up with financial counseling post-operatively.    Discharge Instructions and Follow up:  Ms. Sailaja Mcgee was discharged from the hospital with follow up for treatment planning.    Discharge Diet: Regular  Discharge Activity:   No lifting, driving, or strenuous exercise for 6 week(s)  No heavy lifting, pushing, pulling for 6 week(s)  No sex for 6 week(s)  No  driving or operating machinery while on narcotic analgesics  Pelvic rest: abstain from intercourse and do not use tampons for 6 week(s)  Discharge Follow up:   8/6/2019 10:50 AM Ivy Soto MD  Gyn Oncology     Discharge Disposition:  Discharged to home    Discharge Staff: YOLA ISDIRO MD Jill Miller MD  OBGYN Resident PGY3  07/18/2019 9:48 AM     The patient was seen and examined with the resident, the labs and vital signs were reviewed.The discharge care plan outlined above was provided under my directives and direct supervision. Patient ready for discharge    Yola Isidro MD, MPH  Gynecologic Oncology

## 2019-07-15 NOTE — ANESTHESIA PROCEDURE NOTES
Arterial Line Procedure Note  Staff:     Anesthesiologist:  Fish Molina MD  Location: In OR After Induction  Procedure Start/Stop Times:     patient identified, IV checked, site marked, risks and benefits discussed, informed consent, monitors and equipment checked, pre-op evaluation and at physician/surgeon's request      Correct Patient: Yes      Correct Position: Yes      Correct Site: Yes      Correct Procedure: Yes      Correct Laterality:  Yes    Site Marked:  Yes  Line Placement:     Procedure:  Arterial Line    Insertion Site:  Radial    Insertion laterality:  Left    Skin Prep: Chloraprep      Patient Prep: patient draped, mask, sterile gloves, hat and hand hygiene      Local skin infiltration:  None    Ultrasound Guided?: No      Catheter size:  20 gauge, Quick cath    Cath secured with: anchor securement device      Dressing:  Tegaderm    Complications:  None obvious    Arterial waveform: Yes      IBP within 10% of NIBP: Yes

## 2019-07-15 NOTE — PROGRESS NOTES
"Gynecologic Oncology Postoperative Check Note  7/15/2019    S: Patient reports she is doing well postoperatively. Reports abdominal and back pain that is well controlled with current pain medications. Reports some R hand swelling and mild numbness. Has not yet ambulated. Braswell in place and draining clear yellow urine. Tolerating crackers and water without nausea or vomiting. Has not yet had bowel movement or passed gas. Denies chest pain, shortness of breath, dizziness, or other concerns at this time.     O:  Vitals:    07/15/19 0844   BP: 133/88   BP Location: Right arm   Pulse: 78   Resp: 18   Temp: 98.9  F (37.2  C)   TempSrc: Oral   SpO2: 99%   Weight: 97.7 kg (215 lb 6.2 oz)   Height: 1.664 m (5' 5.5\")     Gen: NAD, laying in bed surrounded by family  Cardio: RRR, S1/S2, no murmurs  Resp: CTAB, no wheezing or crackles. Breathing comfortable on 2L NC.   Abdomen: soft, appropriately tender, non-distended. Vertical midline incision c/d/i.  Extremities: LEs non-tender, no edema. R hand: no erythema, warmth, or tenderness; mild swelling but full ROM and normal strength.      A/P: 49 year old POD#0 s/p XL, BSO, omentectomy, appendectomy, peritoneal biopsies. Patient is afebrile,  hemodynamically stable, and pain is well controlled at this time.      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 Clinical suspicion for malignancy.   FEN: Advance as tolerated. NS at 125ml/hr.   Pain: Tylenol, PO oxycodone, IV dilaudid PRN. No NSAIDs.  Heme: Hgb 11 >  > repeat CBC in AM   CV: No issues.   Pulm: No issues. Maintain SpO2 >94%  GI: Antiemetics/bowel regimen as needed.   : Braswell in place. H/o L nephrectomy and total hysterectomy.   ID: S/p preoperative antibiotics   Endo: No issues.  Psych/Neuro/MSK: H/o seizure disorder, s/p ablation of seizure focus, no meds/recurrent seizures. R hand swelling likely 2/2 intraoperative positioning given normal exam: ice/heating pad PRN  PPX: SCDs, IS, lovenox " POD#1  Drains/Lines: PIV    Dispo: Admitted to     Merced Caballero, MS4

## 2019-07-15 NOTE — ANESTHESIA CARE TRANSFER NOTE
Patient: Sailaja Mcgee    Procedure(s):  Exploratory Laparotomy, Total Abdominal Hysterectomy, Removal Of Both Tubes And Ovaries, Omentectomy, Appendectomy, Peritoneal Biopsies    Diagnosis: Pelvic Mass  Diagnosis Additional Information: No value filed.    Anesthesia Type:   No value filed.     Note:  Airway :Face Mask  Patient transferred to:PACU  Comments: VSS on arrival, report to RN. Handoff Report: Identifed the Patient, Identified the Reponsible Provider, Reviewed the pertinent medical history, Discussed the surgical course, Reviewed Intra-OP anesthesia mangement and issues during anesthesia, Set expectations for post-procedure period and Allowed opportunity for questions and acknowledgement of understanding      Vitals: (Last set prior to Anesthesia Care Transfer)    CRNA VITALS  7/15/2019 1240 - 7/15/2019 1315      7/15/2019             Pulse:  83    ART BP:  111/57    ART Mean:  82    SpO2:  100 %    Resp Rate (observed):  11                Electronically Signed By: EREN Gallegos CRNA  July 15, 2019  1:15 PM

## 2019-07-15 NOTE — OP NOTE
Procedure Date: 07/15/2019      PREOPERATIVE DIAGNOSIS:  Complex pelvic mass with elevated CA-125 and CEA.      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      SURGEON:  Ivy Soto MD      ASSISTANT:  Amarilis Del Cid MD (OB/GYN Resident).      PROCEDURES PERFORMED:   1.  Exploratory laparotomy.   2.  Bilateral salpingo-oophorectomy.   3.  Appendectomy.   4.  Omentectomy.   5.  Peritoneal biopsies.   6.  Lysis of adhesions for approximately 40 minutes.     EBL: 200 ml    COMPLICATIONS: none    SPECIMENS REMOVED:  1. Bilateral ovaries and fallopian tubes  2. Left IP and pelvic peritoneum  3. Appendix  4. Omentum  5. Peritoneal biopsies     FINDINGS:  On bimanual exam, she had normal external genitalia.  She had an absent cervix and uterus.  She had a large abdominal mass palpable roughly to her umbilicus.  On laparotomy, she had approximately 2 liters of red ascites.  She had a very large complex pelvic mass arising from her left adnexa.  It was densely adherent to the sigmoid colon, left pelvic sidewall and bladder peritoneum.  Her right tube and ovary appeared grossly normal.  Her appendix was normal.  Her small bowel was diffusely inflamed but no obvious metastatic disease was noted.  She did have small bowel adherent to her anterior abdominal wall at the site of her old midline laparotomy.  Her omentum was adherent to her anterior abdominal wall.  She had no gross lymphadenopathy.      DESCRIPTION OF PROCEDURE:  After informed consent, the patient was brought to the operating room where general anesthesia was administered.  She was prepped and draped in the dorsal lithotomy position and Braswell catheter was placed in her bladder.  A surgical timeout was performed ensuring correct patient and procedure.  She was given Ancef for prophylactic antibiotics and later Flagyl was when I performed an appendectomy.  SCDs were placed on her lower extremities for DVT  prevention.      A vertical midline incision was made from her pubic symphysis to above her umbilicus.  This was carried down to the fascia, which was incised sharply.  The fascial incision was extended superiorly and inferiorly.  The peritoneum was identified, tented and entered sharply.  The abdomen and pelvis were then inspected with the above findings noted.  A Bookwalter retractor was placed and the abdominal wall was retracted laterally to facilitate visualization of the pelvis.  I first  the mass from the areas of adhesions to the sigmoid colon.  This was done with scissors as well as Bovie cautery.  Ultimately, I was able to identify the left IP vascular complex.  Notably, she had a history of a left nephrectomy.  I isolated the left IP, clamped, cut and suture ligated this pedicle.  This mass was still adherent to the left pelvic sidewall and her anatomy was very distorted here.  It appeared the round ligament was providing a large amount of blood supply to this mass.  I was able to elevate the mass and release it from its posterior attachments.  At one point, it seemed that the appendix was adherent to the mass, so I stapled across what I thought was the base of the appendix.  I later realized that this was likely just a large adhesive band, as I was then able to identify the appendix later in the case.  Ultimately, the mass was freed from the pelvic sidewall and was sent to frozen pathology.  At this point, there was a moderate amount of bleeding from the left hemipelvis.  I therefore opened the left retroperitoneum.  Again, she had had a previous nephrectomy, so I was unable to clearly identify the ureter.  I re-isolated the left IP and clamped, cut and suture ligated this pedicle.  I then resected the left pelvic peritoneum including a portion of the left round ligament.      I then performed a right salpingo-oophorectomy and entered the right retroperitoneal space through the right round  ligament. I identified the right ureter in the retroperitoneum.  I isolated the right IP, clamped, cut and doubly suture ligated this pedicle.  I then released the ovary from the right pelvic sidewall and sent the adnexa for permanent pathology.  At this point, I placed a hemostatic agent, SNoW, in the pelvis and then packed the pelvis, and then attention was turned to the upper abdomen.      The bowel was then run in its entirety.  There was a loop of ileum that was adherent to the anterior abdominal wall.  This was taken down with scissors and inspected and no defects were noted..  There were no abnormalities of the bowel noted.  The appendix was identified.  Given that the frozen section returned as a likely mucinous borderline versus adenocarcinoma, I performed an appendectomy.  I transected the appendiceal artery with LigaSure device and then used a HARRIS stapler with a blue load to separate the appendix from the cecum.  This area was irrigated and very hemostatic.      I then further turned attention to the upper abdomen.  I  the omentum from the anterior abdominal wall.  I then performed an omentectomy with good visualization of the transverse colon.  Given the amount of adhesive disease, I essentially performed an infracolic omentectomy, all with good visualization of the transverse colon.      I then collected peritoneal biopsies from the left and right hemidiaphragm, left and right pericolic gutter, left and right pelvis and the bladder peritoneum.  These were all sent for permanent pathology.  I then copiously irrigated the abdomen and pelvis.  Pedicles were noted to be very hemostatic.  I performed an air leak test by placing a proctoscope in the rectum and inflating the rectum with air and filling the pelvis with saline.  No air leak was noted.      The fascia was then closed from inferior and superior meeting in the middle with #1 looped PDS suture.  The subcutaneous tissue was copiously  irrigated and the skin was closed with staples.  A sterile dressing was placed over the incision.      The patient then received TAP blocks postoperatively, while in the operating room.  The patient was awoken from anesthesia.  She was brought to the recovery room in stable condition.         SWETA BLOOD MD             D: 07/15/2019   T: 07/15/2019   MT: SRI      Name:     YAN ORLANDO   MRN:      [id number]        Account:        [id number]   :      1970           Procedure Date: 07/15/2019      Document: [id number]

## 2019-07-15 NOTE — BRIEF OP NOTE
[facility]  Gynecology Oncology Brief Operative Note    Pre-operative diagnosis:   - Pelvic mass  - S/p TVH  - Seizure disorder  - Solitary kidney    Post-operative diagnosis:  - Same s/p procedure    Procedure: XL, BSO, omentectomy, appendectomy, peritoneal biopsies    Surgeon:   Ivy Soto MD    Assistants:  Amarilis Del Cid MD, PGY3  Merced Caballero MS4    Anesthesia: General     Estimated blood loss: 300 mL  IV Fluids: 2200 mL crystalloid  UOP: 145 mL    Drains: Braswell    Specimens:    ID Type Source Tests Collected by Time Destination   1 : ascites Fluid Pelvis CYTOLOGY NON GYN Ivy Soto MD 7/15/2019 10:17 AM    A : Left Fallopian Tube and Ovary and Appendix- stitich on appendix Organ Fallopian Tube and Ovary, Left SURGICAL PATHOLOGY EXAM Ivy Soto MD 7/15/2019 10:53 AM    B : Left IP and pelvic peritoneum Tissue Peritoneum SURGICAL PATHOLOGY EXAM Ivy Soto MD 7/15/2019 11:06 AM    C : Right ovary and fallopian tube Tissue Fallopian Tube and Ovary, Right SURGICAL PATHOLOGY EXAM Ivy Soto MD 7/15/2019 11:16 AM    D : Omentum Tissue Omentum SURGICAL PATHOLOGY EXAM Ivy Soto MD 7/15/2019 11:46 AM    E : Right Abdi-diaphragm biopsy Tissue Diaphragm, Right SURGICAL PATHOLOGY EXAM Ivy Soto MD 7/15/2019 11:50 AM    F : Right jalen-colic gutter Tissue Paracolic Gutter, Right SURGICAL PATHOLOGY EXAM Ivy Soto MD 7/15/2019 11:52 AM    G : Left abdi-diaphragm biopsy Tissue Abdi Diaphragm SURGICAL PATHOLOGY EXAM Ivy Soto MD 7/15/2019 11:52 AM    H : Left jalen-colic gutter biopsy Tissue Paracolic Gutter, Left SURGICAL PATHOLOGY EXAM Ivy Soto MD 7/15/2019 11:53 AM    I : Appendix Organ Appendix SURGICAL PATHOLOGY EXAM Ivy Soto MD 7/15/2019 12:03 PM    J : Right Pelvic Sidewall  Tissue Pelvis SURGICAL PATHOLOGY EXAM Ivy Soto MD 7/15/2019 12:04 PM    K : Left Pelvic Sidewall Biopsy Tissue Pelvis  SURGICAL PATHOLOGY EXAM Ivy Soto MD 7/15/2019 12:06 PM    L : Bladder Peritoneum Tissue Bladder Peritoneum SURGICAL PATHOLOGY EXAM Ivy Soto MD 7/15/2019 12:09 PM      Findings:  Exam under anesthesia demonstrates large palpable mass felt just below umbilicus, vaginal cuff was normal. Laparotomy revealed large multicystic mass arising from the L ovary with solid components. Approximately 2L of ascites was present on entry. The mass was carefully dissected off pelvic sidewall and surrounding colon. Surgical Sno necessary to achieve hemostasis in areas of oozing. Appendectomy performed, normal appearing appendix. Small bowel appeared inflamed without evidence of metastases. Liver palpated and normal, some nodularity of the spleen. Omentectomy performed, though somewhat limited due to prior surgery and adhesions present. Left ureter not visualized. Right ureter appeared normal. Right ovary with some cystic masses, approximately 0.5-1cm in diameter. Uterus surgically absent. Rectum filled with air with proctoscope and negative bubble test. Frozen specimen revealed mucinous borderline tumor, though clinical suspicion for malignancy based on surgical findings.     Complications: None    Dispo: stable to PACU    Amarilis Del Cid MD  OB/GYN Resident PGY3  Pager [phone]  07/15/2019 12:59 PM

## 2019-07-15 NOTE — ANESTHESIA POSTPROCEDURE EVALUATION
Anesthesia POST Procedure Evaluation    Patient: Sailaja Mcgee   MRN:     [id number] Gender:   female   Age:    49 year old :      1970        Preoperative Diagnosis: Pelvic Mass   Procedure(s):  Exploratory Laparotomy, Total Abdominal Hysterectomy, Removal Of Both Tubes And Ovaries, Omentectomy, Appendectomy, Peritoneal Biopsies   Postop Comments: No value filed.       Anesthesia Type:  General  No value filed.    Reportable Event: NO     PAIN: Uncomplicated   Sign Out status: Comfortable, Well controlled pain     PONV: No PONV   Sign Out status:  No Nausea or Vomiting     Neuro/Psych: Uneventful perioperative course   Sign Out Status: Preoperative baseline; Age appropriate mentation     Airway/Resp.: Uneventful perioperative course   Sign Out Status: Non labored breathing, age appropriate RR; Resp. Status within EXPECTED Parameters     CV: Uneventful perioperative course   Sign Out status: Appropriate BP and perfusion indices; Appropriate HR/Rhythm     Disposition:   Sign Out in:  PACU  Disposition:  Floor  Recovery Course: Uneventful  Follow-Up: Not required     Comments/Narrative:  Awake, comfortable; satisfactory recovery from GA.    Casi Cordero MD  7/15/2019  3:21 PM           Last Anesthesia Record Vitals:  CRNA VITALS  7/15/2019 1240 - 7/15/2019 1340      7/15/2019             NIBP:  95/63    Ht Rate:  87          Last PACU Vitals:  Vitals Value Taken Time   /64 7/15/2019  3:00 PM   Temp 36.1  C (97  F) 7/15/2019  2:45 PM   Pulse 72 7/15/2019  3:10 PM   Resp 16 7/15/2019  3:15 PM   SpO2 99 % 7/15/2019  3:19 PM   Temp src     NIBP 95/63 7/15/2019  1:16 PM   Pulse     SpO2     Resp     Temp     Ht Rate 87 7/15/2019  1:16 PM   Temp 2     Vitals shown include unvalidated device data.      Electronically Signed By: Casi Cordero MD, July 15, 2019, 3:20 PM

## 2019-07-15 NOTE — ANESTHESIA PROCEDURE NOTES
Peripheral Nerve Block Procedure Note    Staff:     Anesthesiologist:  Sea Hernandes MD    Resident/CRNA:  Joleen Roca MD    Block performed by resident/CRNA in the presence of a teaching physician    Location: Pre-op  Procedure Start/Stop TImes:     patient identified, IV checked, site marked, risks and benefits discussed, informed consent, monitors and equipment checked, pre-op evaluation, at physician/surgeon's request and post-op pain management      Correct Patient: Yes      Correct Position: Yes      Correct Site: Yes      Correct Procedure: Yes      Correct Laterality:  Yes    Site Marked:  Yes  Procedure details:     Procedure:  TAP    Laterality:  Bilateral    Position:  Supine    Sterile Prep: chloraprep, mask and sterile gloves      Needle:  Short bevel    Needle gauge:  21    Needle length (mm):  110    Ultrasound: Yes      Ultrasound used to identify targeted nerve, plexus, or vascular structure and placed a needle adjacent to it      Permanent Image entered into patiient's record      Abnormal pain on injection: No      Blood Aspirated: No      Paresthesias:  No    Bleeding at site: No      Bolus via:  Needle    Infusion Method:  Single Shot    Complications:  None  Assessment/Narrative:      Bilateral classic TAP

## 2019-07-16 LAB
ANION GAP SERPL CALCULATED.3IONS-SCNC: 7 MMOL/L (ref 3–14)
BUN SERPL-MCNC: 11 MG/DL (ref 7–30)
CALCIUM SERPL-MCNC: 7.8 MG/DL (ref 8.5–10.1)
CHLORIDE SERPL-SCNC: 108 MMOL/L (ref 94–109)
CO2 SERPL-SCNC: 23 MMOL/L (ref 20–32)
CREAT SERPL-MCNC: 1.03 MG/DL (ref 0.52–1.04)
ERYTHROCYTE [DISTWIDTH] IN BLOOD BY AUTOMATED COUNT: 14.3 % (ref 10–15)
GFR SERPL CREATININE-BSD FRML MDRD: 64 ML/MIN/{1.73_M2}
GLUCOSE SERPL-MCNC: 136 MG/DL (ref 70–99)
HCT VFR BLD AUTO: 27.4 % (ref 35–47)
HGB BLD-MCNC: 8 G/DL (ref 11.7–15.7)
HGB BLD-MCNC: 8.3 G/DL (ref 11.7–15.7)
MCH RBC QN AUTO: 27.6 PG (ref 26.5–33)
MCHC RBC AUTO-ENTMCNC: 30.3 G/DL (ref 31.5–36.5)
MCV RBC AUTO: 91 FL (ref 78–100)
PLATELET # BLD AUTO: 340 10E9/L (ref 150–450)
POTASSIUM SERPL-SCNC: 4.4 MMOL/L (ref 3.4–5.3)
RBC # BLD AUTO: 3.01 10E12/L (ref 3.8–5.2)
SODIUM SERPL-SCNC: 138 MMOL/L (ref 133–144)
WBC # BLD AUTO: 12.4 10E9/L (ref 4–11)

## 2019-07-16 PROCEDURE — 12000001 ZZH R&B MED SURG/OB UMMC

## 2019-07-16 PROCEDURE — 25000131 ZZH RX MED GY IP 250 OP 636 PS 637: Performed by: STUDENT IN AN ORGANIZED HEALTH CARE EDUCATION/TRAINING PROGRAM

## 2019-07-16 PROCEDURE — 85027 COMPLETE CBC AUTOMATED: CPT | Performed by: STUDENT IN AN ORGANIZED HEALTH CARE EDUCATION/TRAINING PROGRAM

## 2019-07-16 PROCEDURE — 25000128 H RX IP 250 OP 636: Performed by: STUDENT IN AN ORGANIZED HEALTH CARE EDUCATION/TRAINING PROGRAM

## 2019-07-16 PROCEDURE — 25000132 ZZH RX MED GY IP 250 OP 250 PS 637: Performed by: STUDENT IN AN ORGANIZED HEALTH CARE EDUCATION/TRAINING PROGRAM

## 2019-07-16 PROCEDURE — 85018 HEMOGLOBIN: CPT | Performed by: STUDENT IN AN ORGANIZED HEALTH CARE EDUCATION/TRAINING PROGRAM

## 2019-07-16 PROCEDURE — 80048 BASIC METABOLIC PNL TOTAL CA: CPT | Performed by: STUDENT IN AN ORGANIZED HEALTH CARE EDUCATION/TRAINING PROGRAM

## 2019-07-16 PROCEDURE — 36415 COLL VENOUS BLD VENIPUNCTURE: CPT | Performed by: STUDENT IN AN ORGANIZED HEALTH CARE EDUCATION/TRAINING PROGRAM

## 2019-07-16 RX ADMIN — ACETAMINOPHEN 650 MG: 325 TABLET, FILM COATED ORAL at 21:33

## 2019-07-16 RX ADMIN — ACETAMINOPHEN 650 MG: 325 TABLET, FILM COATED ORAL at 04:59

## 2019-07-16 RX ADMIN — OMEPRAZOLE 20 MG: 20 CAPSULE, DELAYED RELEASE ORAL at 11:30

## 2019-07-16 RX ADMIN — ACETAMINOPHEN 650 MG: 325 TABLET, FILM COATED ORAL at 15:37

## 2019-07-16 RX ADMIN — OXYCODONE HYDROCHLORIDE 5 MG: 5 TABLET ORAL at 00:15

## 2019-07-16 RX ADMIN — OXYCODONE HYDROCHLORIDE 10 MG: 5 TABLET ORAL at 21:33

## 2019-07-16 RX ADMIN — ACETAMINOPHEN 650 MG: 325 TABLET, FILM COATED ORAL at 10:26

## 2019-07-16 RX ADMIN — ENOXAPARIN SODIUM 40 MG: 40 INJECTION SUBCUTANEOUS at 11:30

## 2019-07-16 RX ADMIN — MAGNESIUM HYDROXIDE 15 ML: 400 SUSPENSION ORAL at 08:43

## 2019-07-16 RX ADMIN — OXYCODONE HYDROCHLORIDE 5 MG: 5 TABLET ORAL at 15:37

## 2019-07-16 RX ADMIN — ONDANSETRON 4 MG: 4 TABLET, ORALLY DISINTEGRATING ORAL at 08:43

## 2019-07-16 RX ADMIN — OXYCODONE HYDROCHLORIDE 5 MG: 5 TABLET ORAL at 06:18

## 2019-07-16 ASSESSMENT — PAIN DESCRIPTION - DESCRIPTORS
DESCRIPTORS: ACHING

## 2019-07-16 ASSESSMENT — ACTIVITIES OF DAILY LIVING (ADL)
ADLS_ACUITY_SCORE: 14
ADLS_ACUITY_SCORE: 13
ADLS_ACUITY_SCORE: 13
ADLS_ACUITY_SCORE: 17

## 2019-07-16 ASSESSMENT — MIFFLIN-ST. JEOR: SCORE: 1575.94

## 2019-07-16 NOTE — PLAN OF CARE
"Assumed cares 0700 to 1500.     BP 93/51 (BP Location: Left arm)   Pulse 70   Temp 95.6  F (35.3  C) (Axillary)   Resp 16   Ht 1.664 m (5' 5.5\")   Wt 93.7 kg (206 lb 9.6 oz)   LMP 09/19/2011   SpO2 95%   BMI 33.86 kg/m       Pain: Reported in abd and back. Pt declined any pain management interventions. Gave pt abd binder for abd, but pt refused to wear at this time.    Neuro: A and O x 4.    Respiratory: Lung sounds clear and equal on RA.    Cardiac/Neurovascular: HR and pulses WDL. Numbness/tingling reported in LEs.    GI/: Bowel sounds active, but no BM since last Wednesday. Admin milk of magnesia, but pt declined scheduled supp at this time. Adequate UOP this AM. Removed batres at 1030 per orders, pt not yet voided.    Nutrition: Ate crackers and drank some fluids. Pt did just ordered up a tray from nutrition, awaiting.    Activity: Up SBA to bathroom this shift.    Skin: Abd incision in abd covered, surgery to be 1st to change.    Lines: 2 PIVs in LUE, one infusing NS at 125/hr, both sites WDL.    Events this shift: Hgb 8.3, notified provider per order parameters. Pt restarted on PTA omeprazole per request. Pt started on subcut lovenox.     Plan: Continue to monitor.    "

## 2019-07-16 NOTE — PROGRESS NOTES
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 She did have some trouble sleeping due to back pain which she attributes to previous vertebral fractures and slipped disc. Reports mild incisional pain that is adequately controlled with current pain regimen. She is tolerating crackers and apple juice without nausea or vomiting. She has not had a bowel movement since Wednesday; normally has a bowel movement every other day. Braswell catheter in place without problem. No chest pain, dizziness, lightheadedness, weakness, shortness of breath. Has not ambulated. She reports that her R hand swelling is improving; denies tenderness, loss of ROM, numbness/tingling.     Objective:  Patient Vitals for the past 24 hrs:   BP Temp Temp src Pulse Heart Rate Resp SpO2 Height Weight   19 0543 98/58 97.2  F (36.2  C) Oral -- 67 15 95 % -- --   19 0346 97/53 96.9  F (36.1  C) Oral -- 80 15 94 % -- --   07/15/19 2203 128/88 97.1  F (36.2  C) Oral -- 90 18 96 % -- --   07/15/19 1950 121/78 -- -- -- 88 -- 95 % -- --   07/15/19 1850 119/44 -- -- -- 79 -- 96 % -- --   07/15/19 1750 121/76 -- -- -- 76 20 97 % -- --   07/15/19 1720 110/69 -- -- -- 83 21 96 % -- --   07/15/19 1650 115/74 -- -- -- 85 20 97 % -- --   07/15/19 1635 110/65 -- -- -- 72 18 96 % -- --   07/15/19 1620 107/70 -- -- -- 84 19 96 % -- --   07/15/19 1605 108/63 -- -- -- 79 18 97 % -- --   07/15/19 1550 101/73 -- -- -- 80 22 96 % -- --   07/15/19 1546 98/66 96.5  F (35.8  C) Oral -- 84 18 97 % -- --   07/15/19 1530 105/69 -- -- 73 77 16 98 % -- --   07/15/19 1515 123/72 -- -- -- 71 16 99 % -- --   07/15/19 1500 106/64 -- -- 69 78 16 99 % -- --   07/15/19 1445  "108/73 97  F (36.1  C) Temporal -- 80 16 98 % -- --   07/15/19 1430 105/71 -- -- 81 85 18 100 % -- --   07/15/19 1415 106/69 -- -- -- 75 16 100 % -- --   07/15/19 1400 105/68 -- -- 78 74 16 100 % -- --   07/15/19 1345 95/63 96.6  F (35.9  C) Temporal -- 78 16 99 % -- --   07/15/19 1330 109/68 -- -- 79 80 18 99 % -- --   07/15/19 1315 103/65 -- -- -- 88 18 98 % -- --   07/15/19 1313 95/63 96.6  F (35.9  C) Temporal 89 -- 18 100 % -- --   07/15/19 0844 133/88 98.9  F (37.2  C) Oral 78 79 18 99 % 1.664 m (5' 5.5\") 97.7 kg (215 lb 6.2 oz)     GEN - NAD, laying comfortably in bed  CV - RRR, normal S1/S2 no murmurs, clicks, gallops  PULM - CTAB, no wheezing, breathing comfortably on room air  ABD - soft, non-distended, appropriately tender  INC - VMI dressing c/d/i  EXT - no edema, non-tender, SCDs in place  L/D - PIV, batres     I/O last 3 completed shifts:  In: 3779.17 [P.O.:400; I.V.:2879.17]  Out: 3720 [Urine:1420; Other:2000; Blood:300]    I/O this shift:  In: 120 [P.O.:120]  Out: 550 [Urine:550]    LABS:  BMP  Recent Labs   Lab 07/15/19  1101 07/15/19  0922     --    POTASSIUM 4.0 4.3   CR  --  1.02   *  --      CBC  Recent Labs   Lab 07/15/19  1101   HGB 11.0*     INRNo lab results found in last 7 days.  LFTs No lab results found in last 7 days.    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   FEN: Reg diet, NS @125m/h. Will discontinue IVF once patient is tolerating adequate oral hydration  Pain: S/p TAP. Tylenol, oxycodone, IV dilaudid. No NSAIDs.   Heme: Hgb 13.1> >AM CBC. Asymptomatic for acute blood loss anemia  CV: NI  Pulm: NI  GI: Bowel regimen, antiemetics PRN. GERD: Prilosec.   : batres in place. Solitary R kidney (donor).  ID: S/p pre-op ancef, s/p intra-op flagyl. Afebrile.   Endo: NI  Psych/Neuro/MSK: H/o seizure disorder, s/p ablation of seizure " focus, no meds  PPX: SCDs, IS, lovenox on POD#1  Dispo: Home when meeting goals  Drains/Lines: PIV    Merced Caballero, MS4    Randee Jeff MD  N OBGYN PGY-2  Personal pager: [phone]  GynOn Team Pager (24/7): [phone]  7/16/2019      The patient was seen and examined with the resident, the labs and vital signs were reviewed. The medical care plan outlined above was provided under my directives and direct supervision.     Yola Isidro MD, MPH  Gynecologic Oncology

## 2019-07-16 NOTE — PROGRESS NOTES
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    Subjective: Denied n/v, hunger sensation, passing gas/BM, pain, or other concerns. Still not taking much food.     Objective:  Patient Vitals for the past 24 hrs:   BP Temp Temp src Pulse Heart Rate Resp SpO2 Weight   19 2226 93/52 98.9  F (37.2  C) Oral -- 85 16 94 % --   19 1539 106/58 97.5  F (36.4  C) Oral -- 87 16 98 % --   19 1100 -- -- -- -- -- -- -- 93.7 kg (206 lb 9.6 oz)   19 0830 93/51 95.6  F (35.3  C) Axillary 70 -- 16 95 % --   19 0543 98/58 97.2  F (36.2  C) Oral -- 67 15 95 % --     GEN - NAD, laying comfortably in bed  CV - well perfused  PULM - bilateral chest rise and fall  ABD - soft, non-distended, appropriately tender  INC - VMI dressing c/d/i  EXT - no edema, non-tender, SCDs in place  L/D - PIV, batres     I/O last 3 completed shifts:  In: 360 [P.O.:360]  Out: 1700 [Urine:1700]    I/O this shift:  In: 240 [P.O.:240]  Out: 600 [Urine:600]    LABS:  BMP  Recent Labs   Lab 19  0644 07/15/19  1101 07/15/19  0922    138  --    POTASSIUM 4.4 4.0 4.3   CHLORIDE 108  --   --    VINI 7.8*  --   --    CO2 23  --   --    BUN 11  --   --    CR 1.03  --  1.02   * 123*  --      CBC  Recent Labs   Lab 19  1752 19  0644 07/15/19  1101   WBC  --  12.4*  --    RBC  --  3.01*  --    HGB 8.0* 8.3* 11.0*   HCT  --  27.4*  --    MCV  --  91  --    MCH  --  27.6  --    MCHC  --  30.3*  --    RDW  --   14.3  --    PLT  --  340  --      INRNo lab results found in last 7 days.  LFTs No lab results found in last 7 days.    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   FEN: Reg diet. S/p IV fluid.  Pain: S/p TAP. Tylenol, oxycodone, IV dilaudid. No NSAIDs.   Heme: Hgb 13.1> >8.3>8.0. Asymptomatic from acute blood loss anemia standpoint.   CV: NI  Pulm: NI  GI: Bowel regimen, antiemetics PRN. GERD: PTA omeprazole  : s/p batres. Solitary R kidney (donor).  ID: S/p pre-op ancef, s/p intra-op flagyl. Afebrile.   Endo: NI  Psych/Neuro/MSK: H/o seizure disorder, s/p ablation of seizure focus, no meds  PPX: SCDs, IS, lovenox  Dispo: Home when meeting goals  Drains/Lines: CRESCENCIO Jeff MD  [facility] PGY-2  Personal pager: [phone]  GynOnc Team Pager (24/7): [phone]  7/17/2019

## 2019-07-16 NOTE — PLAN OF CARE
"BP 98/58 (BP Location: Left arm)   Pulse 73   Temp 97.2  F (36.2  C) (Oral)   Resp 15   Ht 1.664 m (5' 5.5\")   Wt 97.7 kg (215 lb 6.2 oz)   LMP 09/19/2011   SpO2 95%   BMI 35.30 kg/m      VSS with soft Bp. Pain noted in abdomen, tylenol and oxycodone for pain management. Midline incision, dressing marked. Braswell in place, adequate output. Regular diet, denies nausea. PIV infusing IVMF. No BM or flatus. Resting comfortably between cares. Continue with POC.  "

## 2019-07-17 LAB — GLUCOSE BLDC GLUCOMTR-MCNC: 91 MG/DL (ref 70–99)

## 2019-07-17 PROCEDURE — 12000001 ZZH R&B MED SURG/OB UMMC

## 2019-07-17 PROCEDURE — 25000132 ZZH RX MED GY IP 250 OP 250 PS 637: Performed by: STUDENT IN AN ORGANIZED HEALTH CARE EDUCATION/TRAINING PROGRAM

## 2019-07-17 PROCEDURE — 00000146 ZZHCL STATISTIC GLUCOSE BY METER IP

## 2019-07-17 PROCEDURE — 25000128 H RX IP 250 OP 636: Performed by: STUDENT IN AN ORGANIZED HEALTH CARE EDUCATION/TRAINING PROGRAM

## 2019-07-17 RX ORDER — IBUPROFEN 200 MG
400 TABLET ORAL EVERY 6 HOURS PRN
Status: DISCONTINUED | OUTPATIENT
Start: 2019-07-17 | End: 2019-07-18 | Stop reason: HOSPADM

## 2019-07-17 RX ORDER — ACETAMINOPHEN 325 MG/1
650 TABLET ORAL EVERY 6 HOURS
Qty: 12 TABLET | Refills: 0 | Status: ON HOLD | OUTPATIENT
Start: 2019-07-17 | End: 2022-05-16

## 2019-07-17 RX ORDER — AMOXICILLIN 250 MG
2 CAPSULE ORAL 2 TIMES DAILY PRN
Qty: 30 TABLET | Refills: 0 | Status: SHIPPED | OUTPATIENT
Start: 2019-07-17 | End: 2020-02-16

## 2019-07-17 RX ORDER — OXYCODONE HYDROCHLORIDE 5 MG/1
5-10 TABLET ORAL
Qty: 16 TABLET | Refills: 0 | Status: SHIPPED | OUTPATIENT
Start: 2019-07-17 | End: 2019-07-18

## 2019-07-17 RX ADMIN — OXYCODONE HYDROCHLORIDE 5 MG: 5 TABLET ORAL at 06:56

## 2019-07-17 RX ADMIN — OMEPRAZOLE 40 MG: 20 CAPSULE, DELAYED RELEASE ORAL at 09:36

## 2019-07-17 RX ADMIN — OXYCODONE HYDROCHLORIDE 5 MG: 5 TABLET ORAL at 14:08

## 2019-07-17 RX ADMIN — BISACODYL 10 MG: 10 SUPPOSITORY RECTAL at 09:37

## 2019-07-17 RX ADMIN — ACETAMINOPHEN 650 MG: 325 TABLET, FILM COATED ORAL at 16:05

## 2019-07-17 RX ADMIN — OXYCODONE HYDROCHLORIDE 5 MG: 5 TABLET ORAL at 01:21

## 2019-07-17 RX ADMIN — OXYCODONE HYDROCHLORIDE 5 MG: 5 TABLET ORAL at 09:58

## 2019-07-17 RX ADMIN — ACETAMINOPHEN 650 MG: 325 TABLET, FILM COATED ORAL at 05:03

## 2019-07-17 RX ADMIN — ACETAMINOPHEN 650 MG: 325 TABLET, FILM COATED ORAL at 09:52

## 2019-07-17 RX ADMIN — ACETAMINOPHEN 650 MG: 325 TABLET, FILM COATED ORAL at 21:55

## 2019-07-17 RX ADMIN — ENOXAPARIN SODIUM 40 MG: 40 INJECTION SUBCUTANEOUS at 09:52

## 2019-07-17 RX ADMIN — OXYCODONE HYDROCHLORIDE 5 MG: 5 TABLET ORAL at 19:52

## 2019-07-17 ASSESSMENT — ACTIVITIES OF DAILY LIVING (ADL)
ADLS_ACUITY_SCORE: 17

## 2019-07-17 ASSESSMENT — PAIN DESCRIPTION - DESCRIPTORS
DESCRIPTORS: DISCOMFORT;SORE
DESCRIPTORS: DISCOMFORT

## 2019-07-17 NOTE — PLAN OF CARE
"BP 93/52 (BP Location: Left arm)   Pulse 70   Temp 98.9  F (37.2  C) (Oral)   Resp 16   Ht 1.664 m (5' 5.5\")   Wt 93.7 kg (206 lb 9.6 oz)   LMP 09/19/2011   SpO2 94%   BMI 33.86 kg/m      VSS with soft Bps. Up ad lance. Regular diet, denies n/v. Abdominal pain noted near incision, tylenol and oxycodone for pain management. Abdominal bloating, no flatus or bowel movement. BLE intermittent numbness, pedal pulses strong with doppler. Voiding adequately. Resting between cares. Continue with POC.   "

## 2019-07-17 NOTE — PLAN OF CARE
VSS. Pain managed with tylenol and PRN oxycodone and heat packs. Denies nausea. No appetite. Abdominal incision with primapore dressing with dried drainage. Voiding spontaneously post batres removal. Good urine volumes. Up with SBA. Walking in halls with family. Pt resting comfortably. Continue POC.

## 2019-07-17 NOTE — PROGRESS NOTES
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  Prelim results poss tonight/tomorrow.    Up ad lance.  Abd binder placed. Bloating, - gas, - BS, - BM.  Voids spont  Poor PO.  Eating merlin crx., pop.  That's about it.  Incision GRACE  Resting between cares.  Showered  Had visitors  Pain -  tylenol & oxy prn.  piv x 1  Cont with poc.

## 2019-07-18 VITALS
BODY MASS INDEX: 33.2 KG/M2 | OXYGEN SATURATION: 93 % | TEMPERATURE: 97.8 F | HEIGHT: 66 IN | WEIGHT: 206.6 LBS | RESPIRATION RATE: 16 BRPM | HEART RATE: 85 BPM | DIASTOLIC BLOOD PRESSURE: 70 MMHG | SYSTOLIC BLOOD PRESSURE: 96 MMHG

## 2019-07-18 LAB
COPATH REPORT: NORMAL
COPATH REPORT: NORMAL

## 2019-07-18 PROCEDURE — 25000132 ZZH RX MED GY IP 250 OP 250 PS 637: Performed by: STUDENT IN AN ORGANIZED HEALTH CARE EDUCATION/TRAINING PROGRAM

## 2019-07-18 PROCEDURE — 25000128 H RX IP 250 OP 636: Performed by: STUDENT IN AN ORGANIZED HEALTH CARE EDUCATION/TRAINING PROGRAM

## 2019-07-18 RX ORDER — OXYCODONE HYDROCHLORIDE 5 MG/1
5-10 TABLET ORAL
Qty: 16 TABLET | Refills: 0 | Status: SHIPPED | OUTPATIENT
Start: 2019-07-18 | End: 2019-07-26

## 2019-07-18 RX ADMIN — ENOXAPARIN SODIUM 40 MG: 40 INJECTION SUBCUTANEOUS at 09:49

## 2019-07-18 RX ADMIN — ACETAMINOPHEN 650 MG: 325 TABLET, FILM COATED ORAL at 05:27

## 2019-07-18 RX ADMIN — OXYCODONE HYDROCHLORIDE 10 MG: 5 TABLET ORAL at 00:16

## 2019-07-18 RX ADMIN — ACETAMINOPHEN 650 MG: 325 TABLET, FILM COATED ORAL at 09:49

## 2019-07-18 RX ADMIN — OXYCODONE HYDROCHLORIDE 5 MG: 5 TABLET ORAL at 09:49

## 2019-07-18 RX ADMIN — OMEPRAZOLE 40 MG: 20 CAPSULE, DELAYED RELEASE ORAL at 08:21

## 2019-07-18 ASSESSMENT — ACTIVITIES OF DAILY LIVING (ADL)
AMBULATION: 0-->INDEPENDENT
RETIRED_EATING: 0-->INDEPENDENT
BATHING: 0-->INDEPENDENT
ADLS_ACUITY_SCORE: 17
TOILETING: 0-->INDEPENDENT
FALL_HISTORY_WITHIN_LAST_SIX_MONTHS: NO
SWALLOWING: 0-->SWALLOWS FOODS/LIQUIDS WITHOUT DIFFICULTY
COGNITION: 0 - NO COGNITION ISSUES REPORTED
DRESS: 0-->INDEPENDENT
RETIRED_COMMUNICATION: 0-->UNDERSTANDS/COMMUNICATES WITHOUT DIFFICULTY
ADLS_ACUITY_SCORE: 17
TRANSFERRING: 0-->INDEPENDENT
ADLS_ACUITY_SCORE: 17

## 2019-07-18 ASSESSMENT — PAIN DESCRIPTION - DESCRIPTORS
DESCRIPTORS: ACHING;DISCOMFORT
DESCRIPTORS: SORE;ACHING

## 2019-07-18 NOTE — PLAN OF CARE
A&Ox4. VSS on RA. Pain controlled with tylenol and oxycodone given x1. Denies nausea. Tolerating regular diet. PIV saline locked. Up independently in room. Voiding spontaneously. Faint bowel sounds, not passing gas. Encouraged pt to ambulate. Midline incision GRACE. Abd binder in place. Continue to monitor.

## 2019-07-18 NOTE — PROGRESS NOTES
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 Able to ambulate this morning. Denied n/v, but did not attempt eating much overnight. Passed flatus, but no BM. Voiding spontaneously. Doesn't have a ride to go home yet today.    Objective:  Patient Vitals for the past 24 hrs:   BP Temp Temp src Pulse Heart Rate Resp SpO2   19 0619 96/70 97.8  F (36.6  C) Oral -- 78 16 93 %   19 2307 96/54 97.3  F (36.3  C) Oral -- 81 16 95 %   19 1434 102/75 98.6  F (37  C) Oral 85 -- 16 95 %     GEN - NAD, laying comfortably in bed  CV - well perfused  PULM - bilateral chest rise and fall  ABD - soft, non-distended, appropriately tender  INC - VMI dressing c/d/i  EXT - no edema, non-tender, SCDs in place  L/D - PIV,     I/O last 3 completed shifts:  In: 1240 [P.O.:1240]  Out: 1700 [Urine:1700]    I/O this shift:  In: -   Out: 300 [Urine:300]    LABS:  BMP  Recent Labs   Lab 19  0644 07/15/19  1101 07/15/19  0922    138  --    POTASSIUM 4.4 4.0 4.3   CHLORIDE 108  --   --    VINI 7.8*  --   --    CO2 23  --   --    BUN 11  --   --    CR 1.03  --  1.02   * 123*  --      CBC  Recent Labs   Lab 19  1752 19  0644 07/15/19  1101   WBC  --  12.4*  --    RBC  --  3.01*  --    HGB 8.0* 8.3* 11.0*   HCT  --  27.4*  --    MCV  --  91  --    MCH  --  27.6  --    MCHC  --  30.3*  --    RDW  --  14.3  --    PLT  --  340  --      INRNo lab results found in last 7 days.  LFTs No lab results found in last 7 days.    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   FEN: Reg diet. S/p IV fluid.  Pain: S/p TAP. Tylenol, oxycodone, No NSAIDs.   Heme: Hgb 13.1> >8.3>8.0. Asymptomatic from acute blood loss anemia standpoint.   CV: NI  Pulm: NI  GI: Bowel regimen, antiemetics PRN. GERD: PTA omeprazole  : s/p batres. Solitary R kidney (donor).  ID: S/p pre-op ancef, s/p intra-op flagyl. Afebrile.   Endo: NI  Psych/Neuro/MSK: H/o seizure disorder, s/p ablation of seizure focus, no meds  PPX: SCDs, IS, lovenox  Dispo: Home when meeting goals  Drains/Lines: CRESCENCIO Jeff MD ([email or web address])  [facility] O[facility] PGY-2  Personal pager: [phone]  6pm to 6am - GynOnc Team Pager (24/7): [phone]  7/18/2019

## 2019-07-18 NOTE — PLAN OF CARE
Discharge to home. Discharge paperwork signed and discussed with pt. PIV removed. Belongings sent home with pt. Medications picked up at pharmacy. Transportation to home provided by friend.

## 2019-07-18 NOTE — PLAN OF CARE
"BP 96/54 (BP Location: Left arm)   Pulse 85   Temp 97.3  F (36.3  C) (Oral)   Resp 16   Ht 1.664 m (5' 5.5\")   Wt 93.7 kg (206 lb 9.6 oz)   LMP 09/19/2011   SpO2 95%   BMI 33.86 kg/m      VSS with soft Bps. Up ad lance. Regular diet, denies N/V. Pain noted in abdomen and back, oxycodone and tylenol for pain management. Patient report flatus but no bowel movement. Voiding adequately. Resting between cares. Continue with POC.  "

## 2019-07-23 ENCOUNTER — TELEPHONE (OUTPATIENT)
Dept: ONCOLOGY | Facility: CLINIC | Age: 49
End: 2019-07-23

## 2019-07-23 NOTE — TELEPHONE ENCOUNTER
Called patient to inform her of staple removal appointment scheduled for tomorrow 7/24 at 10:20am. She is agreeable, states timing will work well for her. No additional concerns.    Maritza Pathak MD  Ob/Gyn Resident

## 2019-07-23 NOTE — TELEPHONE ENCOUNTER
Called to review pathology  Borderline mucinous ovarian tumor.   DOing well after surgery. Had a fall in the showed but no LOC/head trauma. Incision  Has been stable.   Followup as scheduled.     Ivy Soto MD  Gynecologic Oncology  Pager [phone]

## 2019-07-24 ENCOUNTER — ALLIED HEALTH/NURSE VISIT (OUTPATIENT)
Dept: ONCOLOGY | Facility: CLINIC | Age: 49
End: 2019-07-24
Attending: OBSTETRICS & GYNECOLOGY

## 2019-07-24 ENCOUNTER — TELEPHONE (OUTPATIENT)
Dept: FAMILY MEDICINE | Facility: OTHER | Age: 49
End: 2019-07-24

## 2019-07-24 VITALS — TEMPERATURE: 98.4 F

## 2019-07-24 DIAGNOSIS — Z90.722 S/P BSO (BILATERAL SALPINGO-OOPHORECTOMY): Primary | ICD-10-CM

## 2019-07-24 PROCEDURE — 40000114 ZZH STATISTIC NO CHARGE CLINIC VISIT

## 2019-07-24 ASSESSMENT — PAIN SCALES - GENERAL: PAINLEVEL: MODERATE PAIN (5)

## 2019-07-24 NOTE — NURSING NOTE
Sailaja Mcgee presents to the clinic for removal of staples. The patient has had staples in place for 9 days. There has been no patient reported signs or symptoms of infection or drainage. 36  staples are seen and located on the abdomin. Tetanus status is up to date. All staples were easily removed today. Routine wound care discussed by the RN or provider. The patient will follow up as needed.    Sailaja Anderson CMA ([facility])

## 2019-07-24 NOTE — TELEPHONE ENCOUNTER
RN to call for hospital follow up:    Reason for follow up: Sailaja JENELLE Mcgee appeared on our list for being seen in an Emergency Room or a recent Hospital discharge.    Admitting date: 7/15/19  Discharge date: 7/18/19  Location: [place]  Reason for visit:   Chief Complaint   Patient presents with     Hospital F/U     Pelvic Mass     Thelma Darnell RN, BSN

## 2019-07-24 NOTE — TELEPHONE ENCOUNTER
"Reason for follow up: Sailaja Mgcee appeared on our list for being seen in an Emergency Room or a recent Hospital discharge.     Admitting date: 7/15/19  Discharge date: 7/18/19  Location: [place] [facility]  Reason for visit:        Chief Complaint   Patient presents with     Hospital F/U       Pelvic Mass     ED/Discharge Protocol    \"Hi, my name is Sachi OVIEDOMiek Le, a registered nurse, and I am calling on behalf of Suellen Millard's office at [place].  I am calling to follow up and see how things are going for you after your recent visit.\"    \"I see that you were in the (ER/UC/IP):   Admitting date: 7/15/19   Discharge date: 7/18/19   Location: [facility]      How are you doing now that you are home?\" 'I am doing fine.'    Is patient experiencing symptoms that may require a hospital visit?  No  Her abdominal/pelvic incision is not draining, staples intact, some redness as it is near a skin crease/fold, she states, but no worsening pain, red streaking, heat    Discharge Instructions    \"Let's review your discharge instructions.  What is/are the follow-up recommendations?  Pt. Response: I have an appt this morning for staple removal.  Reviewed discharge instructions (copied from 7/15/19 discharge note):  'Discharge Activity:   No lifting, driving, or strenuous exercise for 6 week(s)  No heavy lifting, pushing, pulling for 6 week(s)  No sex for 6 week(s)  No driving or operating machinery while on narcotic analgesics  Pelvic rest: abstain from intercourse and do not use tampons for 6 week(s)  Discharge Follow up:   8/6/2019 10:50 AM'       \"Were you instructed to make a follow-up appointment?\"Pt. Response: Yes.  Has appointment been made?   No.  \"Can I help you schedule that appointment?\" yes:  Next 5 appointments (look out 90 days)    Jul 26, 2019  3:00 PM CDT  [facility] with Suellen [facility]ard PA-C  [facility] [address] [place] [id number] [phone]       " "    \"When you see the provider, I would recommend that you bring your discharge instructions with you.    Medications    START taking      Dose / Directions   acetaminophen 325 MG tablet  Commonly known as:  TYLENOL       Dose:  650 mg  Take 2 tablets (650 mg) by mouth every 6 hours  Quantity:  12 tablet  Refills:  0      oxyCODONE 5 MG tablet  Commonly known as:  ROXICODONE       Dose:  5-10 mg  Take 1-2 tablets (5-10 mg) by mouth every 3 hours as needed for pain  Quantity:  16 tablet  Refills:  0      senna-docusate 8.6-50 MG tablet  Commonly known as:  SENOKOT-S/PERICOLACE       Dose:  2 tablet  Take 2 tablets by mouth 2 times daily as needed for constipation  Quantity:  30 tablet  Refills:  0                  CONTINUE these medicines which have NOT CHANGED      Dose / Directions   ibuprofen 200 MG capsule  Commonly known as:  ADVIL/MOTRIN       Dose:  400 mg  Take 400 mg by mouth as needed for fever  Refills:  0      Multi-vitamin tablet       Dose:  1 tablet  Take 1 tablet by mouth every morning  Refills:  0      omeprazole 10 MG DR capsule  Commonly known as:  priLOSEC  Indication:  Pt isn't sure if she is taking 20 or 40 mg a day       Dose:  40 mg  Take 40 mg by mouth every morning  Refills:  0                      STOP taking    bisacodyl 5 MG EC tablet  Commonly known as:  DULCOLAX         polyethylene glycol 236 g suspension  Commonly known as:  GoLYTELY/NuLYTELY     Reviewed medication changes per discharge instructions  \"Do you have questions about your medications?\"   No  \"Were you newly diagnosed with heart failure, COPD, diabetes or did you have a heart attack?\"   No  For patients on insulin: \"Did you start on insulin in the hospital or did you have your insulin dose changed?\"   No    Medication reconciliation completed? Yes    Was MTM referral placed (*Make sure to put transitions as reason for referral)?   No    Call Summary    \"Do you have any questions or concerns about your condition or care " "plan at the moment?\"    No  Triage nurse advice given: call back with any concerns/questions, keep follow up appts as scheduled    Patient was in ER 0 in the past year (assess appropriateness of ER visits.)      \"If you have questions or things don't continue to improve, we encourage you contact us through the main clinic number,  [phone].  Even if the clinic is not open, triage nurses are available 24/7 to help you.     We would like you to know that our clinic has extended hours (provide information).  We also have urgent care (provide details on closest location and hours/contact info)\"      \"Thank you for your time and take care!\"    Sachi Le, RN, BSN    "

## 2019-07-24 NOTE — PROGRESS NOTES
Subjective     Sailaja Mcgee is a 49 year old female who presents to clinic today for the following health issues:    HPI       Hospital Follow-up Visit:    Hospital/Nursing Home/IP Rehab Facility: [facility]  Date of Admission: 07/15/2019  Date of Discharge: 07/18/2019  Reason(s) for Admission: Exploratory Laparotomy, Total Abdominal Hysterectomy, Removal Of Both Tubes And Ovaries, Omentectomy, Appendectomy, Peritoneal Biopsies            Problems taking medications regularly:  None       Medication changes since discharge: start taking: tylenol, oxycodone, senna-docusate. Stop taking: bisacodyl, polyethylene glycol       Problems adhering to non-medication therapy:  None    Summary of hospitalization:  [facility] discharge summary reviewed  Diagnostic Tests/Treatments reviewed.  Follow up needed: Oncology follow-up  Other Healthcare Providers Involved in Patient s Care:         None  Update since discharge: improved.     Post Discharge Medication Reconciliation: discharge medications reconciled, continue medications without change.  Plan of care communicated with patient and      Coding guidelines for this visit:  Type of Medical   Decision Making Face-to-Face Visit       within 7 Days of discharge Face-to-Face Visit        within 14 days of discharge   Moderate Complexity [id number]   High Complexity [id number] [id number]            - improving since discharge, however did have an incident where she fell in the shower which set her back a little.   - pain is manageable during the day but worse at night.  No longer has any Oxycodone. Trying to take Tylenol without much relief.  Avoidance of NSAIDs due to one kidney.   - She had staples removed late.  She says the lower portion is still open but not draining, upper portion drains occasionally.   - She is having a hard time keep the lower abdomen dry in the fold.  Wondering what she can do.   - The incision is itchy but not painful.   -  "Having to strain with bowel movements.  Has hemorrhoids which are bothersome. Has bright red blood in stool from wiping.  No tarry black stools.   - Eating normally, has been working this week and tolerating.   - Taking stool softener.  Using topical preparation H.     Current Outpatient Medications   Medication Sig Dispense Refill     acetaminophen (TYLENOL) 325 MG tablet Take 2 tablets (650 mg) by mouth every 6 hours 12 tablet 0     cephALEXin (KEFLEX) 500 MG capsule Take 1 capsule (500 mg) by mouth 3 times daily for 10 days 30 capsule 0     omeprazole (PRILOSEC) 10 MG DR capsule Take 40 mg by mouth every morning        oxyCODONE (ROXICODONE) 5 MG tablet Take 1-2 tablets (5-10 mg) by mouth At Bedtime 12 tablet 0     senna-docusate (SENOKOT-S/PERICOLACE) 8.6-50 MG tablet Take 2 tablets by mouth 2 times daily as needed for constipation 30 tablet 0     ibuprofen (ADVIL/MOTRIN) 200 MG capsule Take 400 mg by mouth as needed for fever       multivitamin w/minerals (MULTI-VITAMIN) tablet Take 1 tablet by mouth every morning        Allergies   Allergen Reactions     Aspirin Rash     Rash on face       Reviewed and updated as needed this visit by Provider  Tobacco  Allergies  Meds  Problems  Med Hx  Surg Hx  Fam Hx         Review of Systems   ROS COMP: Constitutional, HEENT, cardiovascular, pulmonary, GI, , musculoskeletal, neuro, skin, endocrine and psych systems are negative, except as otherwise noted.      Objective    /70   Pulse 86   Temp 97.7  F (36.5  C) (Temporal)   Resp 16   Ht 1.645 m (5' 4.76\")   Wt 91 kg (200 lb 9.6 oz)   LMP 09/19/2011   SpO2 100%   BMI 33.63 kg/m    Body mass index is 33.63 kg/m .  Physical Exam   GENERAL: healthy, alert and no distress  RESP: lungs clear to auscultation - no rales, rhonchi or wheezes  CV: regular rate and rhythm, normal S1 S2, no S3 or S4, no murmur, click or rub, no peripheral edema and peripheral pulses strong  ABDOMEN: soft, nontender, no " hepatosplenomegaly, no masses and bowel sounds normal  MS: no gross musculoskeletal defects noted, no edema  SKIN: vertical central incision of the abdomen.  The most distal 1 inch of incision is mildly erythematous without discharge, induration, fluctuance or tenderness.  There is a 1 inch area of the distal portion of incision that is slightly open  RECTAL: two non-thrombosed hemorrhoids present. No blood present.   PSYCH: mentation appears normal, affect normal/bright    Diagnostic Test Results:  No results found for this or any previous visit (from the past 24 hour(s)).        Assessment & Plan       ICD-10-CM    1. Hospital discharge follow-up Z09    2. S/P BSO (bilateral salpingo-oophorectomy) Z90.722 CBC with platelets     oxyCODONE (ROXICODONE) 5 MG tablet   3. Incisional infection T81.49XA cephALEXin (KEFLEX) 500 MG capsule   4. Constipation, unspecified constipation type K59.00    5. External hemorrhoids K64.4      She is healing well  Reminded slow return to normal activities, she is still on lifting restrictions per surgeon.   Refilled Oxycodone for #12 to use at bedtime.  Use Tylenol PRN. Avoidance of NSAID due to single kidney.   Recheck on Hemoglobin today to make sure it is improving postoperatively, was down to 8.0 at time of discharge.   The incision appears to be healing well, irritation from staples that were left in longer than expected, the lower portion is not completely healed, steri-strips were placed, no signs of infection.  The very distal portion is moist and erythematous without obvious infection and no signs of yeast infection as well. Recommended gauze pads, keep area clean and dry and monitor.  Sent in Cephalexin to pharmacy to get if there is more redness, swelling, or discharge that they notice.  Ok to use baby powder in area if needed to keep dry.   Hemorrhoids present on exam but do not appear acutely thrombosed at this time.  Recommended continuing to use Preparation H and to  start using MIralax as well as her stool softeners especially with continuing on Oxycodone at this time.  Encouraged to stay hydrated.     Return in about 2 weeks (around 8/9/2019) for w/ Oncology.    Options for treatment and follow-up care were reviewed with the patient and/or guardian. Patient and/or guardian engaged in the decision making process and verbalized understanding of the options discussed and agreed with the final plan.    Suellen Millard PA-C  [facility]

## 2019-07-26 ENCOUNTER — OFFICE VISIT (OUTPATIENT)
Dept: FAMILY MEDICINE | Facility: OTHER | Age: 49
End: 2019-07-26

## 2019-07-26 VITALS
WEIGHT: 200.6 LBS | TEMPERATURE: 97.7 F | HEIGHT: 65 IN | RESPIRATION RATE: 16 BRPM | SYSTOLIC BLOOD PRESSURE: 104 MMHG | DIASTOLIC BLOOD PRESSURE: 70 MMHG | OXYGEN SATURATION: 100 % | HEART RATE: 86 BPM | BODY MASS INDEX: 33.42 KG/M2

## 2019-07-26 DIAGNOSIS — Z09 HOSPITAL DISCHARGE FOLLOW-UP: Primary | ICD-10-CM

## 2019-07-26 DIAGNOSIS — T81.49XA INCISIONAL INFECTION: ICD-10-CM

## 2019-07-26 DIAGNOSIS — K59.00 CONSTIPATION, UNSPECIFIED CONSTIPATION TYPE: ICD-10-CM

## 2019-07-26 DIAGNOSIS — Z90.722 S/P BSO (BILATERAL SALPINGO-OOPHORECTOMY): ICD-10-CM

## 2019-07-26 DIAGNOSIS — K64.4 EXTERNAL HEMORRHOIDS: ICD-10-CM

## 2019-07-26 LAB
ERYTHROCYTE [DISTWIDTH] IN BLOOD BY AUTOMATED COUNT: 14.9 % (ref 10–15)
HCT VFR BLD AUTO: 30.5 % (ref 35–47)
HGB BLD-MCNC: 9.4 G/DL (ref 11.7–15.7)
MCH RBC QN AUTO: 27.7 PG (ref 26.5–33)
MCHC RBC AUTO-ENTMCNC: 30.8 G/DL (ref 31.5–36.5)
MCV RBC AUTO: 90 FL (ref 78–100)
PLATELET # BLD AUTO: 536 10E9/L (ref 150–450)
RBC # BLD AUTO: 3.39 10E12/L (ref 3.8–5.2)
WBC # BLD AUTO: 8.7 10E9/L (ref 4–11)

## 2019-07-26 PROCEDURE — 99214 OFFICE O/P EST MOD 30 MIN: CPT | Performed by: PHYSICIAN ASSISTANT

## 2019-07-26 PROCEDURE — 36415 COLL VENOUS BLD VENIPUNCTURE: CPT | Performed by: PHYSICIAN ASSISTANT

## 2019-07-26 PROCEDURE — 85027 COMPLETE CBC AUTOMATED: CPT | Performed by: PHYSICIAN ASSISTANT

## 2019-07-26 RX ORDER — CEPHALEXIN 500 MG/1
500 CAPSULE ORAL 3 TIMES DAILY
Qty: 30 CAPSULE | Refills: 0 | Status: SHIPPED | OUTPATIENT
Start: 2019-07-26 | End: 2019-08-06

## 2019-07-26 RX ORDER — OXYCODONE HYDROCHLORIDE 5 MG/1
5-10 TABLET ORAL AT BEDTIME
Qty: 12 TABLET | Refills: 0 | Status: SHIPPED | OUTPATIENT
Start: 2019-07-26 | End: 2020-02-16

## 2019-07-26 ASSESSMENT — MIFFLIN-ST. JEOR: SCORE: 1537.05

## 2019-08-05 NOTE — PROGRESS NOTES
GYN Oncology Follow Up Visit    Referring provider:    Bert Recinos MD  [address] [place] [address]   RE: Sailaja Mcgee  : 1970  ROGRE: 2019       CC: Post-operative visit    HPI: Ms Sailaja Mcgee is a 49 year old year old female who presents for follow up after XL, BSO, Appy, OMx, Biopsies and CL.  She is here today with her .  Reports she has been doing well post-operatively. Constipation has resolved, now having daily bowel movements. Pain is improving, still notes discomfort at night. Taking Tylenol at night to help with pain control. Notes some pressure with urinary but does feel like she is emptying her bladder completely and no dysuria. Notes increasing hot flashes since surgery. Reports small separation at the top of her incision that has some drainage at times.    Treatment history:   19: MRI of back for back pain  19: CT scan showing large pelvic mass. Ca125 282, CEA 6.9, Ca  7/15/19: XLap/BSO/Appy/Omx/Biopsies/CL. 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     Review of Systems:  Systemic:No weight changes.    Skin : No skin changes or new lesions.   Eye : No changes in vision.   Pulmonary: No cough or SOB.   Cardiovascular: No CP or palpitations  Gastrointestinal : Constipation, resolved. No diarrhea.   Genitourinary: +frequency, pressure  Psychiatric: No depression or anxiety  Hematologic : No palpable lymph nodes.   Endocrine : + hot flashes. No heat/cold intolerance.      Neurological: No headaches, no numbness.     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  Donating to brother. [facility].      COLONOSCOPY N/A 7/10/2019    Procedure: COLONOSCOPY;  Surgeon: Sheba Tenorio MD;  [facility]     CYSTOSCOPY  10/24/2011    Procedure:CYSTOSCOPY; Surgeon:NAYELY MADRIGAL; [facility]     HC BRAIN MAPPING, 1ST HOUR MD ATTENDANCE      ablation of seizure focus     HC REDUCTION OF LARGE BREAST       HYSTERECTOMY TOTAL ABD, [place] SALPINGO-OOPHORECTOMY, NODE DISSECTION, TUMOR DEBULKING, COMBINED Bilateral 7/15/2019    Procedure: Exploratory Laparotomy, Total Abdominal Hysterectomy, Removal Of Both Tubes And Ovaries, Omentectomy, Appendectomy, Peritoneal Biopsies;  Surgeon: Ivy Soto MD;  [facility]     HYSTERECTOMY VAGINAL  10/24/2011    Procedure:HYSTERECTOMY VAGINAL; vaginal hysterectomy and cystoscopy; Surgeon:NAYELY MADRIGAL; [facility]     HYSTEROSCOPY,ABLATION ENDOMETRIUM          OBGYN history and Health Maintenance:    Last Pap Smear: , NILM prior to hysterectomy  Last Mammogram:never  Last Colonoscopy: >15 years    Current Outpatient Medications   Medication Sig Dispense Refill     acetaminophen (TYLENOL) 325 MG tablet Take 2 tablets (650 mg) by mouth every 6 hours 12 tablet 0     cephALEXin (KEFLEX) 500 MG capsule Take 1 capsule (500 mg) by mouth 3 times daily for 10 days 30 capsule 0     ibuprofen (ADVIL/MOTRIN) 200 MG capsule Take 400 mg by mouth as needed for fever       multivitamin w/minerals (MULTI-VITAMIN) tablet Take 1 tablet by mouth every morning        omeprazole (PRILOSEC) 10 MG DR capsule Take 40 mg by mouth every morning        oxyCODONE (ROXICODONE) 5 MG tablet Take 1-2 tablets (5-10 mg) by mouth At Bedtime 12 tablet 0     senna-docusate (SENOKOT-S/PERICOLACE) 8.6-50 MG tablet Take 2 tablets by mouth 2 times daily as needed for constipation 30 tablet 0            Allergies   Allergen Reactions     Aspirin Rash     Rash on face        Social History:  Social History     Tobacco Use     Smoking status: Never Smoker     Smokeless tobacco: Never  Used     Tobacco comment: no smokers in the household   Substance Use Topics     Alcohol use: No     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    General: Alert and oriented, no acute distress  Psych: Mood stable. Linear speech, appropriate affect  Cardiovascular: RRR, no murmors  Pulmonary: Lungs clear . Normal respiratory effort  GI: Incision w 2cm area of opening at the superior aspect. No drainage. No bleeding. No signs of infection.   Lymph: No enlarge lymph nodes in neck or groin  :Def    Patient Name: YAN ORLANDO   MR#: [id number]   Specimen #: [id number]   Collected: 7/15/2019   Received: 7/15/2019   Reported: 7/18/2019 17:13   Ordering Phy(s): SWETA BLOOD     For improved result formatting, select 'View Enhanced Report Format' under    Linked Documents section.     ORIGINAL REPORT:     SPECIMEN(S):   A: Left fallopian tube and ovary and appendix   B: Left IP and pelvic peritoneum   C: Ovary and fallopian tube, right   D: Omentum   E: Right diaphragm biopsy   F: Right pericolic gutter   G: Left jada-diaphragm   H: Left pericolic gutter   I: Appendix   J: Right pelvic sidewall   K: Left pelvic sidewall   L: Bladder peritoneum     FINAL DIAGNOSIS:   A. LEFT FALLOPIAN TUBE AND OVARY, SALPINGO-OOPHORECTOMY (INCLUDING A1FS):   - Borderline mucinous tumor with microinvasion   - Unremarkable fallopian tube     B. PERITONEUM, LEFT IP AND PELVIC, BIOPSY:   -Fibroadipose tissue with surface hemorrhage and adhesions   -Negative for malignancy     C. OVARY AND FALLOPIAN TUBE, RIGHT, SALPINGO-OOPHORECTOMY:    -Ovary with follicular cysts, corpus luteum, surface hemorrhagic adhesions   -Fallopian tube with surface hemorrhage and free, acellular mucin     D. OMENTUM, OMENTECTOMY:   -Adipose tissue with inflammation   -Negative for malignancy     E. PERITONEUM, RIGHT DIAPHRAGM, BIOPSY:   -Fibroadipose tissue with inflammation   -Negative for malignancy     F. PERITONEUM, RIGHT PERICOLIC GUTTER, BIOPSY:   -Fibroadipose tissue with inflammation   -Negative for malignancy     G. PERITONEUM, LEFT HEMIDIAPHRAGM, BIOPSY:   -Fibroadipose tissue with inflammation   -Negative for malignancy     H. PERITONEUM, LEFT PERICOLIC GUTTER, BIOPSY:   -Fibroadipose tissue with inflammation   -Negative for malignancy     I. APPENDIX, APPENDECTOMY:   - Fibrous replacement of the appendix   - Serosal fibrous adhesions with hemorrhage and inflammation   - Negative for malignancy     J. PERITONEUM, RIGHT PELVIC SIDEWALL, BIOPSY:   -Fibroadipose tissue with hemorrhage and inflammation   -Negative for malignancy     K. PERITONEUM, LEFT PELVIC SIDEWALL, BIOPSY:   -Fibroadipose tissue with hemorrhage and inflammation   -Negative for malignancy     L. PERITONEUM, BLADDER, BIOPSY:   -Fibroadipose tissue with hemorrhage and inflammation   -Negative for malignancy     COMMENT:   The final diagnosis confirms the interpretation provided intraoperatively.     Report Name: Ovary, Fallopian Tube, or Primary Peritoneum        Status: Submitted Checklist Inst: 1      Last Updated By: Analisa Staley M.D., PhD, [facility],   07/18/2019 17:12:26   Part(s) Involved:   A: Left fallopian tube and ovary and appendix     Synoptic Report:     SPECIMEN     Procedure:         - Bilateral salpingo-oophorectomy         - Omentectomy         - Peritoneal  biopsies         - Peritoneal washing         Appendectomy     Specimen Integrity of Left Ovary:         - Capsule intact     TUMOR     Tumor Site:         - Left ovary     Histologic Type:         - Mucinous  borderline tumor / atypical proliferative mucinous tumor   with         microinvasion     Tumor Size: 23 Centimeters (cm)     Tumor Extent       Ovarian Surface Involvement:           - Absent       Fallopian Tube Surface Involvement:           - Absent       Implants:           - Not identified       Other Tissue / Organ Involvement:           - Not identified       Peritoneal Ascitic Fluid:           - Malignant (positive for malignancy)       Pleural Fluid:           - Not submitted / unknown     LYMPH NODES     Regional Lymph Nodes:         - No lymph nodes submitted or found     PATHOLOGIC STAGE CLASSIFICATION (PTNM, AJCC 8TH EDITION)     Primary Tumor (pT):         - pT1c3     Regional Lymph Nodes (pN):         - pNX     FIGO STAGE     FIGO Stage:         - IC3     ADDITIONAL FINDINGS     Additional Pathologic Findings:         - None identified     CAP eCC August 2018 Release     I have personally reviewed all specimens and/or slides, including the   listed special stains, and used them   with my medical judgement to determine or confirm the final diagnosis.     Electronically signed out by:     Analisa Staley M.D., PhD, [facility]     Assessment:    Sailaja Mcgee is a 49 year old woman with a new diagnosis of large complex pelvic mass      Plan:     1.)   Stage 1C borderline mucinous ovarian tumor: Reviewed pathology. I am not recommending any adjuvant treatment. She does have a risk for recurrence although this is such a rare tumor, it is difficult to know specific risk. I estimate about 10-20% risk of recurrence given 1C. We discussed surveillance. I think followup with ca-125 would be reasonable. Reviewed signs/sx of recurrence    -Followup in 6 months for exam and Ca-125. OK to see NP. Would only follow Ca-125 for 2-3 years given limited utility in this disease process. No routine imaging    2.) Genetic risk factors were assessed: referral not indicated    3.) Postop: doing well. Incision with small  separation, but healing well otherwise    Ivy Soto MD    Department of Ob/Gyn and Women's Health  Division of Gynecologic Oncology  [facility]  [phone]

## 2019-08-06 ENCOUNTER — OFFICE VISIT (OUTPATIENT)
Dept: ONCOLOGY | Facility: CLINIC | Age: 49
End: 2019-08-06
Attending: OBSTETRICS & GYNECOLOGY

## 2019-08-06 VITALS
HEART RATE: 69 BPM | OXYGEN SATURATION: 100 % | SYSTOLIC BLOOD PRESSURE: 151 MMHG | BODY MASS INDEX: 34.09 KG/M2 | WEIGHT: 203.4 LBS | DIASTOLIC BLOOD PRESSURE: 84 MMHG | TEMPERATURE: 98.7 F

## 2019-08-06 DIAGNOSIS — Z85.43 HISTORY OF OVARIAN CANCER: ICD-10-CM

## 2019-08-06 DIAGNOSIS — R30.0 DYSURIA: Primary | ICD-10-CM

## 2019-08-06 PROCEDURE — G0463 HOSPITAL OUTPT CLINIC VISIT: HCPCS | Mod: ZF

## 2019-08-06 PROCEDURE — 99024 POSTOP FOLLOW-UP VISIT: CPT | Mod: ZP | Performed by: OBSTETRICS & GYNECOLOGY

## 2019-08-06 ASSESSMENT — PAIN SCALES - GENERAL: PAINLEVEL: NO PAIN (0)

## 2019-08-06 NOTE — NURSING NOTE
"Oncology Rooming Note    August 6, 2019 10:26 AM   Sailaja Mcgee is a 49 year old female who presents for:    Chief Complaint   Patient presents with     Oncology Clinic Visit     Ovarian Cancer, Post op - bilateral salpingo-oophorectomy     Initial Vitals: BP (!) 151/84   Pulse 69   Temp 98.7  F (37.1  C) (Oral)   Wt 92.3 kg (203 lb 6.4 oz)   LMP 09/19/2011   SpO2 100%   BMI 34.09 kg/m   Estimated body mass index is 34.09 kg/m  as calculated from the following:    Height as of 7/26/19: 1.645 m (5' 4.76\").    Weight as of this encounter: 92.3 kg (203 lb 6.4 oz). Body surface area is 2.05 meters squared.  No Pain (0) Comment: Data Unavailable   Patient's last menstrual period was 09/19/2011.  Allergies reviewed: Yes  Medications reviewed: Yes    Medications: Medication refills not needed today.  Pharmacy name entered into [facility]: [facility] [address] [place] [id number] [facility]    Clinical concerns: n/a       AMANDA BAXTER CMA                                 "

## 2019-08-06 NOTE — LETTER
2019       RE: Sailaja Mcgee  [address]  [place] [address]     Dear Colleague,    Thank you for referring your patient, Sailaja Mcgee, to the [facility]. Please see a copy of my visit note below.    GYN Oncology Follow Up Visit    Referring provider:    Bert Recinos MD  [address] [place] [address]   RE: Sailaja Mcgee  : 1970  ROGER: 2019       CC: Post-operative visit    HPI: Ms Sailaja Mcgee is a 49 year old year old female who presents for follow up after XL, BSO, Appy, OMx, Biopsies and CL.  She is here today with her .  Reports she has been doing well post-operatively. Constipation has resolved, now having daily bowel movements. Pain is improving, still notes discomfort at night. Taking Tylenol at night to help with pain control. Notes some pressure with urinary but does feel like she is emptying her bladder completely and no dysuria. Notes increasing hot flashes since surgery. Reports small separation at the top of her incision that has some drainage at times.    Treatment history:   19: MRI of back for back pain  19: CT scan showing large pelvic mass. Ca125 282, CEA 6.9, Ca  7/15/19: XLap/BSO/Appy/Omx/Biopsies/CL. 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     Review of Systems:  Systemic:No weight changes.    Skin : No skin changes or new lesions.   Eye : No changes in vision.   Pulmonary: No cough or SOB.   Cardiovascular: No CP or palpitations  Gastrointestinal : Constipation, resolved. No diarrhea.   Genitourinary: +frequency, pressure  Psychiatric: No depression or anxiety  Hematologic : No palpable lymph nodes.   Endocrine : + hot flashes. No heat/cold intolerance.      Neurological: No headaches, no numbness.     Past Medical History:   Diagnosis Date     CKD (chronic kidney disease)     Stage 3     Difficult intubation 7/15/2019     Seizure (H)     corrected by surgical procedure     Solitary kidney  2009    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       Past Surgical History:   Procedure Laterality Date     C LAP,DONOR KIDNEY REMOV,LIVING  2009    Left laparoscopic donor nephrectomy.  Donating to brother. [facility] [place].     COLONOSCOPY N/A 7/10/2019    Procedure: COLONOSCOPY;  Surgeon: Sheba Tenorio MD;  [facility]     CYSTOSCOPY  10/24/2011    Procedure:CYSTOSCOPY; Surgeon:NAYELY MADRIGAL; Location[facility]     HC BRAIN MAPPING, 1ST HOUR MD ATTENDANCE      ablation of seizure focus     HC REDUCTION OF LARGE BREAST       HYSTERECTOMY TOTAL ABD, [place] SALPINGO-OOPHORECTOMY, NODE DISSECTION, TUMOR DEBULKING, COMBINED Bilateral 7/15/2019    Procedure: Exploratory Laparotomy, Total Abdominal Hysterectomy, Removal Of Both Tubes And Ovaries, Omentectomy, Appendectomy, Peritoneal Biopsies;  Surgeon: Ivy Soto MD;  [facility]     HYSTERECTOMY VAGINAL  10/24/2011    Procedure:HYSTERECTOMY VAGINAL; vaginal hysterectomy and cystoscopy; Surgeon:NAYELY MADRIGAL; [facility]     HYSTEROSCOPY,ABLATION ENDOMETRIUM          OBGYN history and Health Maintenance:    Last Pap Smear: , NILM prior to hysterectomy  Last Mammogram:never  Last Colonoscopy: >15 years    Current Outpatient Medications   Medication Sig Dispense Refill     acetaminophen (TYLENOL) 325 MG tablet Take 2 tablets (650 mg) by mouth every 6 hours 12 tablet 0     cephALEXin (KEFLEX) 500 MG capsule Take 1 capsule (500 mg) by mouth 3 times daily for 10 days 30 capsule 0     ibuprofen (ADVIL/MOTRIN) 200 MG capsule Take 400 mg by mouth as needed for fever       multivitamin w/minerals (MULTI-VITAMIN) tablet Take 1 tablet by mouth every morning        omeprazole (PRILOSEC) 10 MG DR capsule Take 40 mg by mouth every morning        oxyCODONE (ROXICODONE) 5 MG tablet Take 1-2 tablets (5-10 mg) by mouth At Bedtime 12 tablet 0     senna-docusate (SENOKOT-S/PERICOLACE) 8.6-50 MG tablet Take 2 tablets by mouth 2 times daily as needed for  constipation 30 tablet 0            Allergies   Allergen Reactions     Aspirin Rash     Rash on face        Social History:  Social History     Tobacco Use     Smoking status: Never Smoker     Smokeless tobacco: Never Used     Tobacco comment: no smokers in the household   Substance Use Topics     Alcohol use: No     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    General: Alert and oriented, no acute distress  Psych: Mood stable. Linear speech, appropriate affect  Cardiovascular: RRR, no murmors  Pulmonary: Lungs clear . Normal respiratory effort  GI: Incision w 2cm area of opening at the superior aspect. No drainage. No bleeding. No signs of infection.   Lymph: No enlarge lymph nodes in neck or groin  :Def    Patient Name: YAN ORLANDO   MR#: [id number]   Specimen #: [id number]   Collected: 7/15/2019   Received: 7/15/2019   Reported: 7/18/2019 17:13   Ordering Phy(s): SWETA BLOOD     For improved result formatting, select 'View Enhanced Report Format' under    Linked Documents section.     ORIGINAL REPORT:     SPECIMEN(S):   A: Left fallopian tube and ovary and appendix   B: Left IP and pelvic peritoneum   C: Ovary and fallopian tube, right   D: Omentum   E: Right diaphragm biopsy   F: Right pericolic gutter   G: Left jada-diaphragm   H: Left pericolic gutter   I: Appendix   J: Right pelvic sidewall   K: Left pelvic sidewall   L: Bladder peritoneum     FINAL DIAGNOSIS:   A. LEFT FALLOPIAN TUBE AND OVARY, SALPINGO-OOPHORECTOMY (INCLUDING A1FS):   - Borderline mucinous tumor with microinvasion   -  Unremarkable fallopian tube     B. PERITONEUM, LEFT IP AND PELVIC, BIOPSY:   -Fibroadipose tissue with surface hemorrhage and adhesions   -Negative for malignancy     C. OVARY AND FALLOPIAN TUBE, RIGHT, SALPINGO-OOPHORECTOMY:   -Ovary with follicular cysts, corpus luteum, surface hemorrhagic adhesions   -Fallopian tube with surface hemorrhage and free, acellular mucin     D. OMENTUM, OMENTECTOMY:   -Adipose tissue with inflammation   -Negative for malignancy     E. PERITONEUM, RIGHT DIAPHRAGM, BIOPSY:   -Fibroadipose tissue with inflammation   -Negative for malignancy     F. PERITONEUM, RIGHT PERICOLIC GUTTER, BIOPSY:   -Fibroadipose tissue with inflammation   -Negative for malignancy     G. PERITONEUM, LEFT HEMIDIAPHRAGM, BIOPSY:   -Fibroadipose tissue with inflammation   -Negative for malignancy     H. PERITONEUM, LEFT PERICOLIC GUTTER, BIOPSY:   -Fibroadipose tissue with inflammation   -Negative for malignancy     I. APPENDIX, APPENDECTOMY:   - Fibrous replacement of the appendix   - Serosal fibrous adhesions with hemorrhage and inflammation   - Negative for malignancy     J. PERITONEUM, RIGHT PELVIC SIDEWALL, BIOPSY:   -Fibroadipose tissue with hemorrhage and inflammation   -Negative for malignancy     K. PERITONEUM, LEFT PELVIC SIDEWALL, BIOPSY:   -Fibroadipose tissue with hemorrhage and inflammation   -Negative for malignancy     L. PERITONEUM, BLADDER, BIOPSY:   -Fibroadipose tissue with hemorrhage and inflammation   -Negative for malignancy     COMMENT:   The final diagnosis confirms the interpretation provided intraoperatively.     Report Name: Ovary, Fallopian Tube, or Primary Peritoneum        Status: Submitted Checklist Inst: 1      Last Updated By: Analisa Staley M.D., PhD, [facility],   07/18/2019 17:12:26   Part(s) Involved:   A: Left fallopian tube and ovary and appendix     Synoptic Report:     SPECIMEN     Procedure:         - Bilateral salpingo-oophorectomy         - Omentectomy         -  Peritoneal  biopsies         - Peritoneal washing         Appendectomy     Specimen Integrity of Left Ovary:         - Capsule intact     TUMOR     Tumor Site:         - Left ovary     Histologic Type:         - Mucinous borderline tumor / atypical proliferative mucinous tumor   with         microinvasion     Tumor Size: 23 Centimeters (cm)     Tumor Extent       Ovarian Surface Involvement:           - Absent       Fallopian Tube Surface Involvement:           - Absent       Implants:           - Not identified       Other Tissue / Organ Involvement:           - Not identified       Peritoneal Ascitic Fluid:           - Malignant (positive for malignancy)       Pleural Fluid:           - Not submitted / unknown     LYMPH NODES     Regional Lymph Nodes:         - No lymph nodes submitted or found     PATHOLOGIC STAGE CLASSIFICATION (PTNM, AJCC 8TH EDITION)     Primary Tumor (pT):         - pT1c3     Regional Lymph Nodes (pN):         - pNX     FIGO STAGE     FIGO Stage:         - IC3     ADDITIONAL FINDINGS     Additional Pathologic Findings:         - None identified     CAP eCC August 2018 Release     I have personally reviewed all specimens and/or slides, including the   listed special stains, and used them   with my medical judgement to determine or confirm the final diagnosis.     Electronically signed out by:     Analisa Staley M.D., PhD, [facility]     Assessment:    Sailaja Mgcee is a 49 year old woman with a new diagnosis of large complex pelvic mass      Plan:     1.)   Stage 1C borderline mucinous ovarian tumor: Reviewed pathology. I am not recommending any adjuvant treatment. She does have a risk for recurrence although this is such a rare tumor, it is difficult to know specific risk. I estimate about 10-20% risk of recurrence given 1C. We discussed surveillance. I think followup with ca-125 would be reasonable. Reviewed signs/sx of recurrence    -Followup in 6 months for exam and Ca-125. OK to see  NP. Would only follow Ca-125 for 2-3 years given limited utility in this disease process. No routine imaging    2.) Genetic risk factors were assessed: referral not indicated    3.) Postop: doing well. Incision with small separation, but healing well otherwise    Ivy Soto MD    Department of Ob/Gyn and Women's Health  Division of Gynecologic Oncology  [facility]  [phone]        Again, thank you for allowing me to participate in the care of your patient.      Sincerely,    Ivy Soto MD

## 2020-01-31 ENCOUNTER — TELEPHONE (OUTPATIENT)
Dept: ONCOLOGY | Facility: CLINIC | Age: 50
End: 2020-01-31

## 2020-01-31 NOTE — TELEPHONE ENCOUNTER
RN was contacted by  financial services about the patient upcoming appointment.      Due to know and insurance and lack of being able to reach patient, patient would need to sign a waiver and have a financial down payment to be seen. Appt is not urgent.    Patient could be followed by local Obgyn.    Patient unavailable.   Voicemail with call back numbers for both financial counseling and RN.    Maritza Brasher RN

## 2020-02-04 ENCOUNTER — ONCOLOGY VISIT (OUTPATIENT)
Dept: ONCOLOGY | Facility: CLINIC | Age: 50
End: 2020-02-04
Attending: OBSTETRICS & GYNECOLOGY
Payer: COMMERCIAL

## 2020-02-04 ENCOUNTER — APPOINTMENT (OUTPATIENT)
Dept: LAB | Facility: CLINIC | Age: 50
End: 2020-02-04
Attending: OBSTETRICS & GYNECOLOGY

## 2020-02-04 VITALS
BODY MASS INDEX: 39.21 KG/M2 | TEMPERATURE: 97.8 F | DIASTOLIC BLOOD PRESSURE: 87 MMHG | WEIGHT: 233.9 LBS | SYSTOLIC BLOOD PRESSURE: 129 MMHG | OXYGEN SATURATION: 97 % | HEART RATE: 98 BPM | RESPIRATION RATE: 20 BRPM

## 2020-02-04 DIAGNOSIS — R30.0 DYSURIA: ICD-10-CM

## 2020-02-04 DIAGNOSIS — Z85.43 HISTORY OF OVARIAN CANCER: ICD-10-CM

## 2020-02-04 LAB
ALBUMIN UR-MCNC: NEGATIVE MG/DL
APPEARANCE UR: ABNORMAL
BACTERIA #/AREA URNS HPF: ABNORMAL /HPF
BILIRUB UR QL STRIP: NEGATIVE
CANCER AG125 SERPL-ACNC: 12 U/ML (ref 0–30)
COLOR UR AUTO: YELLOW
GLUCOSE UR STRIP-MCNC: NEGATIVE MG/DL
HGB UR QL STRIP: NEGATIVE
KETONES UR STRIP-MCNC: NEGATIVE MG/DL
LEUKOCYTE ESTERASE UR QL STRIP: NEGATIVE
MUCOUS THREADS #/AREA URNS LPF: PRESENT /LPF
NITRATE UR QL: NEGATIVE
PH UR STRIP: 5 PH (ref 5–7)
RBC #/AREA URNS AUTO: <1 /HPF (ref 0–2)
SOURCE: ABNORMAL
SP GR UR STRIP: 1.01 (ref 1–1.03)
SQUAMOUS #/AREA URNS AUTO: 4 /HPF (ref 0–1)
UROBILINOGEN UR STRIP-MCNC: 0 MG/DL (ref 0–2)
WBC #/AREA URNS AUTO: 1 /HPF (ref 0–5)

## 2020-02-04 PROCEDURE — 36415 COLL VENOUS BLD VENIPUNCTURE: CPT

## 2020-02-04 PROCEDURE — 99214 OFFICE O/P EST MOD 30 MIN: CPT | Mod: ZP | Performed by: OBSTETRICS & GYNECOLOGY

## 2020-02-04 PROCEDURE — 86304 IMMUNOASSAY TUMOR CA 125: CPT | Performed by: OBSTETRICS & GYNECOLOGY

## 2020-02-04 PROCEDURE — 81001 URINALYSIS AUTO W/SCOPE: CPT | Performed by: OBSTETRICS & GYNECOLOGY

## 2020-02-04 PROCEDURE — G0463 HOSPITAL OUTPT CLINIC VISIT: HCPCS

## 2020-02-04 ASSESSMENT — PAIN SCALES - GENERAL: PAINLEVEL: NO PAIN (0)

## 2020-02-04 NOTE — LETTER
2020     RE: Sailaja Mcgee  [address]  [place] [address]     Dear Colleague,    Thank you for referring your patient, Sailaja Mcgee, to the [facility]. Please see a copy of my visit note below.    GYN Oncology Follow Up Visit    Referring provider:    Bert Recinos MD  [address] [place] [address]   RE: Sailaja Mcgee  : 1970  ROGER: 2020       CC: Post-operative visit    HPI: Ms Sailaja Mcgee is a 49 year old year old female who presents for follow up of her borderline mucinous tumor.       Today she is noticing the same symptoms that brought her it.   Having some abdominal bloating. Noticing right leg numbness (had this before). ALso having skin changes (acne on her face) which she has notices for a few weeks.   Still having hot flashes, these are worsening.       Treatment history:   19: MRI of back for back pain  19: CT scan showing large pelvic mass. Ca125 282, CEA 6.9,   7/15/19: XLap/BSO/Appy/Omx/Biopsies/CL. 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     Review of Systems:  Systemic:No weight changes.    Skin : No skin changes or new lesions.   Eye : No changes in vision.   Pulmonary: No cough or SOB.   Cardiovascular: No CP or palpitations  Gastrointestinal : Constipation, resolved. No diarrhea. +bloating  Genitourinary: +frequency, pressure  Psychiatric: No depression or anxiety  Hematologic : No palpable lymph nodes.   Endocrine : + hot flashes.      Neurological: No headaches, no numbness.     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  Donating to brother. [facility].     COLONOSCOPY N/A  7/10/2019    Procedure: COLONOSCOPY;  Surgeon: Sheba Tenorio MD;  [facility]     CYSTOSCOPY  10/24/2011    Procedure:CYSTOSCOPY; Surgeon:NAYELY MADRIGAL; Location[facility] OR     HC BRAIN MAPPING, 1ST HOUR MD ATTENDANCE      ablation of seizure focus     HC REDUCTION OF LARGE BREAST       HYSTERECTOMY TOTAL ABD, [place] SALPINGO-OOPHORECTOMY, NODE DISSECTION, TUMOR DEBULKING, COMBINED Bilateral 7/15/2019    Procedure: Exploratory Laparotomy, Total Abdominal Hysterectomy, Removal Of Both Tubes And Ovaries, Omentectomy, Appendectomy, Peritoneal Biopsies;  Surgeon: Ivy Soto MD;  [facility] OR     HYSTERECTOMY VAGINAL  10/24/2011    Procedure:HYSTERECTOMY VAGINAL; vaginal hysterectomy and cystoscopy; Surgeon:NAYELY MADRIGAL; Location[facility]     HYSTEROSCOPY,ABLATION ENDOMETRIUM          OBGYN history and Health Maintenance:    Last Pap Smear: , NILM prior to hysterectomy  Last Mammogram:never  Last Colonoscopy: 2019 at [facility] prior to surgery, normal    Current Outpatient Medications   Medication Sig Dispense Refill     acetaminophen (TYLENOL) 325 MG tablet Take 2 tablets (650 mg) by mouth every 6 hours (Patient not taking: Reported on 2020) 12 tablet 0     ibuprofen (ADVIL/MOTRIN) 200 MG capsule Take 400 mg by mouth as needed for fever       omeprazole (PRILOSEC) 10 MG DR capsule Take 40 mg by mouth every morning        oxyCODONE (ROXICODONE) 5 MG tablet Take 1-2 tablets (5-10 mg) by mouth At Bedtime (Patient not taking: Reported on 2020) 12 tablet 0     senna-docusate (SENOKOT-S/PERICOLACE) 8.6-50 MG tablet Take 2 tablets by mouth 2 times daily as needed for constipation (Patient not taking: Reported on 2020) 30 tablet 0            Allergies   Allergen Reactions     Aspirin Rash     Rash on face        Social History:  Social History     Tobacco Use     Smoking status: Never Smoker     Smokeless tobacco: Never Used     Tobacco comment: no smokers in the household    Substance Use Topics     Alcohol use: No     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    General: Alert and oriented, no acute distress  Psych: Mood stable. Linear speech, appropriate affect  Cardiovascular: RRR, no murmors  Pulmonary: Lungs clear . Normal respiratory effort  GI: Incision w 2cm area of opening at the superior aspect. No drainage. No bleeding. No signs of infection.   Lymph: No enlarge lymph nodes in neck or groin  :Def    Patient Name: YAN ORLANDO   MR#: [id number]   Specimen #: [id number]   Collected: 7/15/2019   Received: 7/15/2019   Reported: 7/18/2019 17:13   Ordering Phy(s): SWETA BLOOD     For improved result formatting, select 'View Enhanced Report Format' under    Linked Documents section.     ORIGINAL REPORT:     SPECIMEN(S):   A: Left fallopian tube and ovary and appendix   B: Left IP and pelvic peritoneum   C: Ovary and fallopian tube, right   D: Omentum   E: Right diaphragm biopsy   F: Right pericolic gutter   G: Left jada-diaphragm   H: Left pericolic gutter   I: Appendix   J: Right pelvic sidewall   K: Left pelvic sidewall   L: Bladder peritoneum     FINAL DIAGNOSIS:   A. LEFT FALLOPIAN TUBE AND OVARY, SALPINGO-OOPHORECTOMY (INCLUDING A1FS):   - Borderline mucinous tumor with microinvasion   - Unremarkable fallopian tube     B. PERITONEUM, LEFT IP AND PELVIC, BIOPSY:   -Fibroadipose tissue with surface  hemorrhage and adhesions   -Negative for malignancy     C. OVARY AND FALLOPIAN TUBE, RIGHT, SALPINGO-OOPHORECTOMY:   -Ovary with follicular cysts, corpus luteum, surface hemorrhagic adhesions   -Fallopian tube with surface hemorrhage and free, acellular mucin     D. OMENTUM, OMENTECTOMY:   -Adipose tissue with inflammation   -Negative for malignancy     E. PERITONEUM, RIGHT DIAPHRAGM, BIOPSY:   -Fibroadipose tissue with inflammation   -Negative for malignancy     F. PERITONEUM, RIGHT PERICOLIC GUTTER, BIOPSY:   -Fibroadipose tissue with inflammation   -Negative for malignancy     G. PERITONEUM, LEFT HEMIDIAPHRAGM, BIOPSY:   -Fibroadipose tissue with inflammation   -Negative for malignancy     H. PERITONEUM, LEFT PERICOLIC GUTTER, BIOPSY:   -Fibroadipose tissue with inflammation   -Negative for malignancy     I. APPENDIX, APPENDECTOMY:   - Fibrous replacement of the appendix   - Serosal fibrous adhesions with hemorrhage and inflammation   - Negative for malignancy     J. PERITONEUM, RIGHT PELVIC SIDEWALL, BIOPSY:   -Fibroadipose tissue with hemorrhage and inflammation   -Negative for malignancy     K. PERITONEUM, LEFT PELVIC SIDEWALL, BIOPSY:   -Fibroadipose tissue with hemorrhage and inflammation   -Negative for malignancy     L. PERITONEUM, BLADDER, BIOPSY:   -Fibroadipose tissue with hemorrhage and inflammation   -Negative for malignancy     COMMENT:   The final diagnosis confirms the interpretation provided intraoperatively.     Report Name: Ovary, Fallopian Tube, or Primary Peritoneum        Status: Submitted Checklist Inst: 1      Last Updated By: Analisa Staley M.D., PhD, Physicians,   07/18/2019 17:12:26   Part(s) Involved:   A: Left fallopian tube and ovary and appendix     Synoptic Report:     SPECIMEN     Procedure:         - Bilateral salpingo-oophorectomy         - Omentectomy         - Peritoneal  biopsies         - Peritoneal washing         Appendectomy     Specimen Integrity of Left Ovary:          - Capsule intact     TUMOR     Tumor Site:         - Left ovary     Histologic Type:         - Mucinous borderline tumor / atypical proliferative mucinous tumor   with         microinvasion     Tumor Size: 23 Centimeters (cm)     Tumor Extent       Ovarian Surface Involvement:           - Absent       Fallopian Tube Surface Involvement:           - Absent       Implants:           - Not identified       Other Tissue / Organ Involvement:           - Not identified       Peritoneal Ascitic Fluid:           - Malignant (positive for malignancy)       Pleural Fluid:           - Not submitted / unknown     LYMPH NODES     Regional Lymph Nodes:         - No lymph nodes submitted or found     PATHOLOGIC STAGE CLASSIFICATION (PTNM, AJCC 8TH EDITION)     Primary Tumor (pT):         - pT1c3     Regional Lymph Nodes (pN):         - pNX     FIGO STAGE     FIGO Stage:         - IC3     ADDITIONAL FINDINGS     Additional Pathologic Findings:         - None identified     CAP eCC August 2018 Release     I have personally reviewed all specimens and/or slides, including the   listed special stains, and used them   with my medical judgement to determine or confirm the final diagnosis.     Electronically signed out by:     Analisa Staley M.D., PhD, Physicians     Assessment:    Sailaja Mcgee is a 49 year old woman with a history of stage 1C3 borderline mucinous ovarian neoplasm      Plan:     1.)   Stage 1C borderline mucinous ovarian tumor: Now with bloating, leg pain and skin changes, all of these symptoms remind her of the symptoms she had when she was first diagnosed. Exam without obvious recurrence.     -CT A/P  -Ca-125 today  -Would recommend 6 month followup if this workup is negative    2.) Genetic risk factors were assessed: referral not indicated      Ivy Soto MD    Department of Ob/Gyn and Women's Health  Division of Gynecologic Oncology  [facility]  [place]  [phone]

## 2020-02-04 NOTE — NURSING NOTE
"Oncology Rooming Note    February 4, 2020 3:47 PM   Sailaja Mcgee is a 49 year old female who presents for:    Chief Complaint   Patient presents with     Blood Draw     Venipuncture labs collected by RN.      Oncology Clinic Visit     Return; Ovarian Ca     Initial Vitals: /87 (BP Location: Right arm, Patient Position: Sitting)   Pulse 98   Temp 97.8  F (36.6  C) (Oral)   Resp 20   Wt 106.1 kg (233 lb 14.4 oz)   LMP 09/19/2011   SpO2 97%   BMI 39.21 kg/m   Estimated body mass index is 39.21 kg/m  as calculated from the following:    Height as of 7/26/19: 1.645 m (5' 4.76\").    Weight as of this encounter: 106.1 kg (233 lb 14.4 oz). Body surface area is 2.2 meters squared.  No Pain (0) Comment: Data Unavailable   Patient's last menstrual period was 09/19/2011.  Allergies reviewed: Yes  Medications reviewed: Yes    Medications: Medication refills not needed today.  Pharmacy name entered into [facility]: [facility] [address] [place] [id number] [facility]    Clinical concerns: Patient denies pelvic and vaginal pain at today's visit.         Janet Correa CMA              "

## 2020-02-04 NOTE — PROGRESS NOTES
GYN Oncology Follow Up Visit    Referring provider:    Bert Recinos MD  [address] [place] [address]   RE: Sailaja Mcgee  : 1970  ROGER: 2020       CC: Post-operative visit    HPI: Ms Sailaja Mcgee is a 49 year old year old female who presents for follow up of her borderline mucinous tumor.       Today she is noticing the same symptoms that brought her it.   Having some abdominal bloating. Noticing right leg numbness (had this before). ALso having skin changes (acne on her face) which she has notices for a few weeks.   Still having hot flashes, these are worsening.       Treatment history:   19: MRI of back for back pain  19: CT scan showing large pelvic mass. Ca125 282, CEA 6.9,   7/15/19: XLap/BSO/Appy/Omx/Biopsies/CL. 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     Review of Systems:  Systemic:No weight changes.    Skin : No skin changes or new lesions.   Eye : No changes in vision.   Pulmonary: No cough or SOB.   Cardiovascular: No CP or palpitations  Gastrointestinal : Constipation, resolved. No diarrhea. +bloating  Genitourinary: +frequency, pressure  Psychiatric: No depression or anxiety  Hematologic : No palpable lymph nodes.   Endocrine : + hot flashes.      Neurological: No headaches, no numbness.     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  Donating to brother. [facility].     COLONOSCOPY N/A 7/10/2019    Procedure: COLONOSCOPY;  Surgeon: Sheba Tenorio MD;  [facility] OR     CYSTOSCOPY  10/24/2011    Procedure:CYSTOSCOPY; Surgeon:NAYELY MADRIGAL; Location: OR      BRAIN MAPPING, 1ST HOUR MD  ATTENDANCE      ablation of seizure focus     HC REDUCTION OF LARGE BREAST       HYSTERECTOMY TOTAL ABD, [place] SALPINGO-OOPHORECTOMY, NODE DISSECTION, TUMOR DEBULKING, COMBINED Bilateral 7/15/2019    Procedure: Exploratory Laparotomy, Total Abdominal Hysterectomy, Removal Of Both Tubes And Ovaries, Omentectomy, Appendectomy, Peritoneal Biopsies;  Surgeon: Ivy Soto MD;  Location: [facility] OR     HYSTERECTOMY VAGINAL  10/24/2011    Procedure:HYSTERECTOMY VAGINAL; vaginal hysterectomy and cystoscopy; Surgeon:NAYELY MADRIGAL; Location:[facility] OR     HYSTEROSCOPY,ABLATION ENDOMETRIUM          OBGYN history and Health Maintenance:    Last Pap Smear: , NILM prior to hysterectomy  Last Mammogram:never  Last Colonoscopy: 2019 at [facility] prior to surgery, normal    Current Outpatient Medications   Medication Sig Dispense Refill     acetaminophen (TYLENOL) 325 MG tablet Take 2 tablets (650 mg) by mouth every 6 hours (Patient not taking: Reported on 2020) 12 tablet 0     ibuprofen (ADVIL/MOTRIN) 200 MG capsule Take 400 mg by mouth as needed for fever       omeprazole (PRILOSEC) 10 MG DR capsule Take 40 mg by mouth every morning        oxyCODONE (ROXICODONE) 5 MG tablet Take 1-2 tablets (5-10 mg) by mouth At Bedtime (Patient not taking: Reported on 2020) 12 tablet 0     senna-docusate (SENOKOT-S/PERICOLACE) 8.6-50 MG tablet Take 2 tablets by mouth 2 times daily as needed for constipation (Patient not taking: Reported on 2020) 30 tablet 0            Allergies   Allergen Reactions     Aspirin Rash     Rash on face        Social History:  Social History     Tobacco Use     Smoking status: Never Smoker     Smokeless tobacco: Never Used     Tobacco comment: no smokers in the household   Substance Use Topics     Alcohol use: No     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    General: Alert and oriented, no acute distress  Psych: Mood stable. Linear speech, appropriate affect  Cardiovascular: RRR, no murmors  Pulmonary: Lungs clear . Normal respiratory effort  GI: Incision w 2cm area of opening at the superior aspect. No drainage. No bleeding. No signs of infection.   Lymph: No enlarge lymph nodes in neck or groin  :Def    Patient Name: YAN ORLANDO   MR#: [id number]   Specimen #: [id number]   Collected: 7/15/2019   Received: 7/15/2019   Reported: 7/18/2019 17:13   Ordering Phy(s): SWETA BLOOD     For improved result formatting, select 'View Enhanced Report Format' under    Linked Documents section.     ORIGINAL REPORT:     SPECIMEN(S):   A: Left fallopian tube and ovary and appendix   B: Left IP and pelvic peritoneum   C: Ovary and fallopian tube, right   D: Omentum   E: Right diaphragm biopsy   F: Right pericolic gutter   G: Left jada-diaphragm   H: Left pericolic gutter   I: Appendix   J: Right pelvic sidewall   K: Left pelvic sidewall   L: Bladder peritoneum     FINAL DIAGNOSIS:   A. LEFT FALLOPIAN TUBE AND OVARY, SALPINGO-OOPHORECTOMY (INCLUDING A1FS):   - Borderline mucinous tumor with microinvasion   - Unremarkable fallopian tube     B. PERITONEUM, LEFT IP AND PELVIC, BIOPSY:   -Fibroadipose tissue with surface hemorrhage and adhesions   -Negative for malignancy     C. OVARY AND FALLOPIAN TUBE, RIGHT, SALPINGO-OOPHORECTOMY:   -Ovary with follicular cysts, corpus luteum, surface hemorrhagic adhesions   -Fallopian  tube with surface hemorrhage and free, acellular mucin     D. OMENTUM, OMENTECTOMY:   -Adipose tissue with inflammation   -Negative for malignancy     E. PERITONEUM, RIGHT DIAPHRAGM, BIOPSY:   -Fibroadipose tissue with inflammation   -Negative for malignancy     F. PERITONEUM, RIGHT PERICOLIC GUTTER, BIOPSY:   -Fibroadipose tissue with inflammation   -Negative for malignancy     G. PERITONEUM, LEFT HEMIDIAPHRAGM, BIOPSY:   -Fibroadipose tissue with inflammation   -Negative for malignancy     H. PERITONEUM, LEFT PERICOLIC GUTTER, BIOPSY:   -Fibroadipose tissue with inflammation   -Negative for malignancy     I. APPENDIX, APPENDECTOMY:   - Fibrous replacement of the appendix   - Serosal fibrous adhesions with hemorrhage and inflammation   - Negative for malignancy     J. PERITONEUM, RIGHT PELVIC SIDEWALL, BIOPSY:   -Fibroadipose tissue with hemorrhage and inflammation   -Negative for malignancy     K. PERITONEUM, LEFT PELVIC SIDEWALL, BIOPSY:   -Fibroadipose tissue with hemorrhage and inflammation   -Negative for malignancy     L. PERITONEUM, BLADDER, BIOPSY:   -Fibroadipose tissue with hemorrhage and inflammation   -Negative for malignancy     COMMENT:   The final diagnosis confirms the interpretation provided intraoperatively.     Report Name: Ovary, Fallopian Tube, or Primary Peritoneum        Status: Submitted Checklist Inst: 1      Last Updated By: Analisa Staley M.D., PhD, Physicians,   07/18/2019 17:12:26   Part(s) Involved:   A: Left fallopian tube and ovary and appendix     Synoptic Report:     SPECIMEN     Procedure:         - Bilateral salpingo-oophorectomy         - Omentectomy         - Peritoneal  biopsies         - Peritoneal washing         Appendectomy     Specimen Integrity of Left Ovary:         - Capsule intact     TUMOR     Tumor Site:         - Left ovary     Histologic Type:         - Mucinous borderline tumor / atypical proliferative mucinous tumor   with         microinvasion     Tumor  Size: 23 Centimeters (cm)     Tumor Extent       Ovarian Surface Involvement:           - Absent       Fallopian Tube Surface Involvement:           - Absent       Implants:           - Not identified       Other Tissue / Organ Involvement:           - Not identified       Peritoneal Ascitic Fluid:           - Malignant (positive for malignancy)       Pleural Fluid:           - Not submitted / unknown     LYMPH NODES     Regional Lymph Nodes:         - No lymph nodes submitted or found     PATHOLOGIC STAGE CLASSIFICATION (PTNM, AJCC 8TH EDITION)     Primary Tumor (pT):         - pT1c3     Regional Lymph Nodes (pN):         - pNX     FIGO STAGE     FIGO Stage:         - IC3     ADDITIONAL FINDINGS     Additional Pathologic Findings:         - None identified     CAP [facility] August 2018 Release     I have personally reviewed all specimens and/or slides, including the   listed special stains, and used them   with my medical judgement to determine or confirm the final diagnosis.     Electronically signed out by:     Analisa Staley M.D., PhD, Physicians     Assessment:    Sailaja Mcgee is a 49 year old woman with a history of stage 1C3 borderline mucinous ovarian neoplasm      Plan:     1.)   Stage 1C borderline mucinous ovarian tumor: Now with bloating, leg pain and skin changes, all of these symptoms remind her of the symptoms she had when she was first diagnosed. Exam without obvious recurrence.     -CT A/P  -Ca-125 today  -Would recommend 6 month followup if this workup is negative    2.) Genetic risk factors were assessed: referral not indicated      Ivy Soto MD    Department of Ob/Gyn and Women's Health  Division of Gynecologic Oncology  [facility]  [phone]

## 2020-02-16 ENCOUNTER — NURSE TRIAGE (OUTPATIENT)
Dept: NURSING | Facility: CLINIC | Age: 50
End: 2020-02-16

## 2020-02-16 ENCOUNTER — APPOINTMENT (OUTPATIENT)
Dept: CT IMAGING | Facility: CLINIC | Age: 50
End: 2020-02-16
Attending: FAMILY MEDICINE

## 2020-02-16 ENCOUNTER — HOSPITAL ENCOUNTER (OUTPATIENT)
Facility: CLINIC | Age: 50
Setting detail: OBSERVATION
Discharge: HOME | End: 2020-02-17
Attending: FAMILY MEDICINE | Admitting: HOSPITALIST
Payer: COMMERCIAL

## 2020-02-16 DIAGNOSIS — G45.4 TGA (TRANSIENT GLOBAL AMNESIA): ICD-10-CM

## 2020-02-16 DIAGNOSIS — Z87.898 HISTORY OF SEIZURE: Primary | ICD-10-CM

## 2020-02-16 PROBLEM — N18.30 CKD (CHRONIC KIDNEY DISEASE) STAGE 3, GFR 30-59 ML/MIN (H): Status: ACTIVE | Noted: 2019-06-11

## 2020-02-16 PROBLEM — E66.01 MORBID OBESITY (H): Status: ACTIVE | Noted: 2019-06-10

## 2020-02-16 LAB
ALBUMIN UR-MCNC: NEGATIVE MG/DL
AMPHETAMINES UR QL: NOT DETECTED NG/ML
ANION GAP SERPL CALCULATED.3IONS-SCNC: 8 MMOL/L (ref 3–14)
APPEARANCE UR: CLEAR
APTT PPP: 32 SEC (ref 22–37)
BACTERIA #/AREA URNS HPF: ABNORMAL /HPF
BARBITURATES UR QL SCN: NOT DETECTED NG/ML
BASOPHILS # BLD AUTO: 0 10E9/L (ref 0–0.2)
BASOPHILS NFR BLD AUTO: 0.4 %
BENZODIAZ UR QL SCN: NOT DETECTED NG/ML
BILIRUB UR QL STRIP: NEGATIVE
BUN SERPL-MCNC: 21 MG/DL (ref 7–30)
BUPRENORPHINE UR QL: NOT DETECTED NG/ML
CALCIUM SERPL-MCNC: 8.6 MG/DL (ref 8.5–10.1)
CANNABINOIDS UR QL: NOT DETECTED NG/ML
CHLORIDE SERPL-SCNC: 106 MMOL/L (ref 94–109)
CO2 SERPL-SCNC: 26 MMOL/L (ref 20–32)
COCAINE UR QL SCN: NOT DETECTED NG/ML
COLOR UR AUTO: COLORLESS
CREAT SERPL-MCNC: 1.27 MG/DL (ref 0.52–1.04)
D-METHAMPHET UR QL: NOT DETECTED NG/ML
DIFFERENTIAL METHOD BLD: NORMAL
EOSINOPHIL NFR BLD AUTO: 2.1 %
ERYTHROCYTE [DISTWIDTH] IN BLOOD BY AUTOMATED COUNT: 14 % (ref 10–15)
ETHANOL SERPL-MCNC: <0.01 G/DL
GFR SERPL CREATININE-BSD FRML MDRD: 50 ML/MIN/{1.73_M2}
GLUCOSE BLDC GLUCOMTR-MCNC: 107 MG/DL (ref 70–99)
GLUCOSE SERPL-MCNC: 118 MG/DL (ref 70–99)
GLUCOSE UR STRIP-MCNC: NEGATIVE MG/DL
HCT VFR BLD AUTO: 41.3 % (ref 35–47)
HGB BLD-MCNC: 13.1 G/DL (ref 11.7–15.7)
HGB UR QL STRIP: NEGATIVE
IMM GRANULOCYTES # BLD: 0.1 10E9/L (ref 0–0.4)
IMM GRANULOCYTES NFR BLD: 0.7 %
INR PPP: 1.07 (ref 0.86–1.14)
KETONES UR STRIP-MCNC: NEGATIVE MG/DL
LEUKOCYTE ESTERASE UR QL STRIP: NEGATIVE
LYMPHOCYTES # BLD AUTO: 2.9 10E9/L (ref 0.8–5.3)
LYMPHOCYTES NFR BLD AUTO: 31.7 %
MCH RBC QN AUTO: 26.6 PG (ref 26.5–33)
MCHC RBC AUTO-ENTMCNC: 31.7 G/DL (ref 31.5–36.5)
MCV RBC AUTO: 84 FL (ref 78–100)
METHADONE UR QL SCN: NOT DETECTED NG/ML
MONOCYTES # BLD AUTO: 0.6 10E9/L (ref 0–1.3)
MONOCYTES NFR BLD AUTO: 6.4 %
NEUTROPHILS # BLD AUTO: 5.3 10E9/L (ref 1.6–8.3)
NEUTROPHILS NFR BLD AUTO: 58.7 %
NITRATE UR QL: NEGATIVE
NRBC # BLD AUTO: 0 10*3/UL
NRBC BLD AUTO-RTO: 0 /100
OPIATES UR QL SCN: NOT DETECTED NG/ML
OXYCODONE UR QL SCN: NOT DETECTED NG/ML
PCP UR QL SCN: NOT DETECTED NG/ML
PH UR STRIP: 6 PH (ref 5–7)
PLATELET # BLD AUTO: 367 10E9/L (ref 150–450)
POTASSIUM SERPL-SCNC: 3.9 MMOL/L (ref 3.4–5.3)
PROPOXYPH UR QL: NOT DETECTED NG/ML
RBC # BLD AUTO: 4.92 10E12/L (ref 3.8–5.2)
RBC #/AREA URNS AUTO: <1 /HPF (ref 0–2)
SODIUM SERPL-SCNC: 140 MMOL/L (ref 133–144)
SOURCE: ABNORMAL
SP GR UR STRIP: 1.01 (ref 1–1.03)
TRICYCLICS UR QL SCN: NOT DETECTED NG/ML
TROPONIN I SERPL-MCNC: <0.015 UG/L (ref 0–0.04)
UROBILINOGEN UR STRIP-MCNC: 0 MG/DL (ref 0–2)
WBC # BLD AUTO: 9.1 10E9/L (ref 4–11)
WBC #/AREA URNS AUTO: <1 /HPF (ref 0–5)

## 2020-02-16 PROCEDURE — 99285 EMERGENCY DEPT VISIT HI MDM: CPT | Mod: 25 | Performed by: FAMILY MEDICINE

## 2020-02-16 PROCEDURE — G0378 HOSPITAL OBSERVATION PER HR: HCPCS

## 2020-02-16 PROCEDURE — 25000128 H RX IP 250 OP 636: Performed by: FAMILY MEDICINE

## 2020-02-16 PROCEDURE — 93005 ELECTROCARDIOGRAM TRACING: CPT | Performed by: FAMILY MEDICINE

## 2020-02-16 PROCEDURE — 80306 DRUG TEST PRSMV INSTRMNT: CPT | Performed by: FAMILY MEDICINE

## 2020-02-16 PROCEDURE — 25000125 ZZHC RX 250: Performed by: FAMILY MEDICINE

## 2020-02-16 PROCEDURE — 25800030 ZZH RX IP 258 OP 636: Performed by: FAMILY MEDICINE

## 2020-02-16 PROCEDURE — 81001 URINALYSIS AUTO W/SCOPE: CPT | Performed by: FAMILY MEDICINE

## 2020-02-16 PROCEDURE — 99220 ZZC INITIAL OBSERVATION CARE,LEVL III: CPT | Performed by: HOSPITALIST

## 2020-02-16 PROCEDURE — 99207 ZZC CDG-MDM COMPONENT: MEETS MODERATE - UP CODED: CPT | Performed by: HOSPITALIST

## 2020-02-16 PROCEDURE — 80307 DRUG TEST PRSMV CHEM ANLYZR: CPT | Performed by: FAMILY MEDICINE

## 2020-02-16 PROCEDURE — 00000146 ZZHCL STATISTIC GLUCOSE BY METER IP

## 2020-02-16 PROCEDURE — 85730 THROMBOPLASTIN TIME PARTIAL: CPT | Performed by: FAMILY MEDICINE

## 2020-02-16 PROCEDURE — 25000132 ZZH RX MED GY IP 250 OP 250 PS 637: Performed by: FAMILY MEDICINE

## 2020-02-16 PROCEDURE — 99291 CRITICAL CARE FIRST HOUR: CPT | Mod: 25 | Performed by: FAMILY MEDICINE

## 2020-02-16 PROCEDURE — 80320 DRUG SCREEN QUANTALCOHOLS: CPT | Performed by: FAMILY MEDICINE

## 2020-02-16 PROCEDURE — 70450 CT HEAD/BRAIN W/O DYE: CPT | Mod: 59

## 2020-02-16 PROCEDURE — 80048 BASIC METABOLIC PNL TOTAL CA: CPT | Performed by: FAMILY MEDICINE

## 2020-02-16 PROCEDURE — 84484 ASSAY OF TROPONIN QUANT: CPT | Performed by: FAMILY MEDICINE

## 2020-02-16 PROCEDURE — 93010 ELECTROCARDIOGRAM REPORT: CPT | Mod: Z6 | Performed by: FAMILY MEDICINE

## 2020-02-16 PROCEDURE — 70498 CT ANGIOGRAPHY NECK: CPT

## 2020-02-16 PROCEDURE — 85025 COMPLETE CBC W/AUTO DIFF WBC: CPT | Performed by: FAMILY MEDICINE

## 2020-02-16 PROCEDURE — 85610 PROTHROMBIN TIME: CPT | Performed by: FAMILY MEDICINE

## 2020-02-16 RX ORDER — ACETAMINOPHEN 325 MG/1
975 TABLET ORAL ONCE
Status: COMPLETED | OUTPATIENT
Start: 2020-02-16 | End: 2020-02-16

## 2020-02-16 RX ORDER — IOPAMIDOL 755 MG/ML
100 INJECTION, SOLUTION INTRAVASCULAR ONCE
Status: COMPLETED | OUTPATIENT
Start: 2020-02-16 | End: 2020-02-16

## 2020-02-16 RX ADMIN — ACETAMINOPHEN 975 MG: 325 TABLET, FILM COATED ORAL at 21:09

## 2020-02-16 RX ADMIN — SODIUM CHLORIDE, PRESERVATIVE FREE 10 ML: 5 INJECTION INTRAVENOUS at 19:35

## 2020-02-16 RX ADMIN — SODIUM CHLORIDE 100 ML: 9 INJECTION, SOLUTION INTRAVENOUS at 19:36

## 2020-02-16 RX ADMIN — IOPAMIDOL 100 ML: 755 INJECTION, SOLUTION INTRAVENOUS at 19:36

## 2020-02-16 RX ADMIN — SODIUM CHLORIDE, PRESERVATIVE FREE 500 ML: 5 INJECTION INTRAVENOUS at 20:06

## 2020-02-16 NOTE — TELEPHONE ENCOUNTER
"Caller is daughter who is not with patient ; states her father (who is not with patient either but called her )  called  and report patient is talking  confused .    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 and has had  brain surgery   Triage protocol reviewed   Caller is advised to call EMS to respond to  Patient's  Residence or evaluation of  s erious symptoms   Caller understand and will comply   Review of EMR does not support  Patient's history as given by her daughter   Radha Zavala RN  FNA          Reason for Disposition    Caller requesting lab results    Additional Information    Lab result questions    Negative: ED call to PCP    Negative: Physician call to PCP    Negative: Call about patient who is currently hospitalized    Negative: Lab or radiology calling with CRITICAL test results    Negative: [1] Prescription not at pharmacy AND [2] was prescribed today by PCP    Negative: [1] Follow-up call from patient regarding patient's clinical status AND [2] information urgent    Negative: [1] Caller requests to speak ONLY to PCP AND [2] URGENT question    Negative: [1] Caller requests to speak to PCP now AND [2] won't tell us reason for call  (Exception: if 10 pm to 6 am, caller must first discuss reason for the call)    Negative: Notification of hospital admission    Negative: Notification of death    Protocols used: PCP CALL - NO TRIAGE-A-AH, INFORMATION ONLY CALL-A-      "

## 2020-02-16 NOTE — TELEPHONE ENCOUNTER
Reason for Disposition    Health Information question, no triage required and triager able to answer question    Protocols used: INFORMATION ONLY CALL-A-AH

## 2020-02-17 ENCOUNTER — APPOINTMENT (OUTPATIENT)
Dept: MRI IMAGING | Facility: CLINIC | Age: 50
End: 2020-02-17
Attending: HOSPITALIST

## 2020-02-17 ENCOUNTER — APPOINTMENT (OUTPATIENT)
Dept: PHYSICAL THERAPY | Facility: CLINIC | Age: 50
End: 2020-02-17
Attending: HOSPITALIST

## 2020-02-17 VITALS
WEIGHT: 236.2 LBS | TEMPERATURE: 97.9 F | DIASTOLIC BLOOD PRESSURE: 70 MMHG | HEART RATE: 84 BPM | OXYGEN SATURATION: 97 % | BODY MASS INDEX: 39.35 KG/M2 | HEIGHT: 65 IN | RESPIRATION RATE: 16 BRPM | SYSTOLIC BLOOD PRESSURE: 135 MMHG

## 2020-02-17 PROBLEM — G45.4 AMNESIA, GLOBAL, TRANSIENT: Status: ACTIVE | Noted: 2020-02-17

## 2020-02-17 LAB
ANION GAP SERPL CALCULATED.3IONS-SCNC: 9 MMOL/L (ref 3–14)
BUN SERPL-MCNC: 19 MG/DL (ref 7–30)
CALCIUM SERPL-MCNC: 8.5 MG/DL (ref 8.5–10.1)
CHLORIDE SERPL-SCNC: 109 MMOL/L (ref 94–109)
CO2 SERPL-SCNC: 22 MMOL/L (ref 20–32)
CREAT SERPL-MCNC: 1.15 MG/DL (ref 0.52–1.04)
ERYTHROCYTE [DISTWIDTH] IN BLOOD BY AUTOMATED COUNT: 14.2 % (ref 10–15)
GFR SERPL CREATININE-BSD FRML MDRD: 56 ML/MIN/{1.73_M2}
GLUCOSE SERPL-MCNC: 107 MG/DL (ref 70–99)
HCT VFR BLD AUTO: 38.4 % (ref 35–47)
HGB BLD-MCNC: 12.3 G/DL (ref 11.7–15.7)
MCH RBC QN AUTO: 26.9 PG (ref 26.5–33)
MCHC RBC AUTO-ENTMCNC: 32 G/DL (ref 31.5–36.5)
MCV RBC AUTO: 84 FL (ref 78–100)
PLATELET # BLD AUTO: 309 10E9/L (ref 150–450)
POTASSIUM SERPL-SCNC: 3.8 MMOL/L (ref 3.4–5.3)
RBC # BLD AUTO: 4.58 10E12/L (ref 3.8–5.2)
SODIUM SERPL-SCNC: 140 MMOL/L (ref 133–144)
WBC # BLD AUTO: 7.4 10E9/L (ref 4–11)

## 2020-02-17 PROCEDURE — 36415 COLL VENOUS BLD VENIPUNCTURE: CPT | Performed by: HOSPITALIST

## 2020-02-17 PROCEDURE — 80048 BASIC METABOLIC PNL TOTAL CA: CPT | Performed by: HOSPITALIST

## 2020-02-17 PROCEDURE — 97530 THERAPEUTIC ACTIVITIES: CPT | Mod: GP | Performed by: PHYSICAL THERAPIST

## 2020-02-17 PROCEDURE — G0378 HOSPITAL OBSERVATION PER HR: HCPCS

## 2020-02-17 PROCEDURE — 85027 COMPLETE CBC AUTOMATED: CPT | Performed by: HOSPITALIST

## 2020-02-17 PROCEDURE — 25000132 ZZH RX MED GY IP 250 OP 250 PS 637: Performed by: FAMILY MEDICINE

## 2020-02-17 PROCEDURE — 25500064 ZZH RX 255 OP 636: Performed by: HOSPITALIST

## 2020-02-17 PROCEDURE — A9585 GADOBUTROL INJECTION: HCPCS | Performed by: HOSPITALIST

## 2020-02-17 PROCEDURE — 99217 ZZC OBSERVATION CARE DISCHARGE: CPT | Performed by: FAMILY MEDICINE

## 2020-02-17 PROCEDURE — 25000132 ZZH RX MED GY IP 250 OP 250 PS 637: Performed by: HOSPITALIST

## 2020-02-17 PROCEDURE — 70553 MRI BRAIN STEM W/O & W/DYE: CPT

## 2020-02-17 PROCEDURE — 97162 PT EVAL MOD COMPLEX 30 MIN: CPT | Mod: GP | Performed by: PHYSICAL THERAPIST

## 2020-02-17 RX ORDER — PANTOPRAZOLE SODIUM 40 MG/1
40 TABLET, DELAYED RELEASE ORAL
Status: DISCONTINUED | OUTPATIENT
Start: 2020-02-17 | End: 2020-02-17 | Stop reason: HOSPADM

## 2020-02-17 RX ORDER — IBUPROFEN 600 MG/1
600 TABLET, FILM COATED ORAL EVERY 6 HOURS PRN
Status: DISCONTINUED | OUTPATIENT
Start: 2020-02-17 | End: 2020-02-17 | Stop reason: HOSPADM

## 2020-02-17 RX ORDER — ACETAMINOPHEN 650 MG/1
650 SUPPOSITORY RECTAL EVERY 4 HOURS PRN
Status: DISCONTINUED | OUTPATIENT
Start: 2020-02-17 | End: 2020-02-17 | Stop reason: HOSPADM

## 2020-02-17 RX ORDER — ONDANSETRON 4 MG/1
4 TABLET, ORALLY DISINTEGRATING ORAL EVERY 6 HOURS PRN
Status: DISCONTINUED | OUTPATIENT
Start: 2020-02-17 | End: 2020-02-17 | Stop reason: HOSPADM

## 2020-02-17 RX ORDER — ONDANSETRON 2 MG/ML
4 INJECTION INTRAMUSCULAR; INTRAVENOUS EVERY 6 HOURS PRN
Status: DISCONTINUED | OUTPATIENT
Start: 2020-02-17 | End: 2020-02-17 | Stop reason: HOSPADM

## 2020-02-17 RX ORDER — GADOBUTROL 604.72 MG/ML
10 INJECTION INTRAVENOUS ONCE
Status: COMPLETED | OUTPATIENT
Start: 2020-02-17 | End: 2020-02-17

## 2020-02-17 RX ORDER — ACETAMINOPHEN 325 MG/1
650 TABLET ORAL EVERY 4 HOURS PRN
Status: DISCONTINUED | OUTPATIENT
Start: 2020-02-17 | End: 2020-02-17 | Stop reason: HOSPADM

## 2020-02-17 RX ORDER — NALOXONE HYDROCHLORIDE 0.4 MG/ML
.1-.4 INJECTION, SOLUTION INTRAMUSCULAR; INTRAVENOUS; SUBCUTANEOUS
Status: DISCONTINUED | OUTPATIENT
Start: 2020-02-17 | End: 2020-02-17 | Stop reason: HOSPADM

## 2020-02-17 RX ADMIN — GADOBUTROL 10 ML: 604.72 INJECTION INTRAVENOUS at 11:54

## 2020-02-17 RX ADMIN — PANTOPRAZOLE SODIUM 40 MG: 40 TABLET, DELAYED RELEASE ORAL at 06:40

## 2020-02-17 RX ADMIN — IBUPROFEN 600 MG: 600 TABLET ORAL at 13:44

## 2020-02-17 ASSESSMENT — MIFFLIN-ST. JEOR: SCORE: 1702.28

## 2020-02-17 NOTE — PROGRESS NOTES
"S-(situation): Patient arrives to room 266 via WC from ED    B-(background): Transient Global Amnesia    A-(assessment): BP (!) 147/83   Pulse 80   Temp 97  F (36.1  C) (Temporal)   Resp 20   Ht 1.651 m (5' 5\")   Wt 107.1 kg (236 lb 3.2 oz)   LMP 09/19/2011   SpO2 100%   BMI 39.31 kg/m  . A&O to self, time and place. Does not recall why she is here. Neuro's intact. PERRLA. Seizure precautions in place. Denies pain at this time. Will continue to monitor.       R-(recommendations): Orders reviewed with Pt. Will monitor patient per MD orders.     Inpatient nursing criteria listed below were met:    Health care directives status obtained and documented: No, not interested  SCD's Documented: No, observation pt  Skin issues/needs documented: no  Isolation needs addressed, if appropriate: No  Fall Prevention: Care plan updated, Education given and documented Yes  MRSA swab completed for ICU admissions only: NA  Care Plan initiated: Yes  Education Assessment documented:Yes  Education Documented (Pre-existing chronic infection such as, MRSA/VRE need education on admission): Yes  Admission Medication Reconciliation completed: Yes  New medication patient education completed and documented (Possible Side Effects of Common Medications handout): Yes  Home medications if not able to send immediately home with family stored here: NA  Reminder note placed in discharge instructions: NA  Discharge planning review completed (admission navigator) No        "

## 2020-02-17 NOTE — PROGRESS NOTES
"Subjective     Sailaja Mcgee is a 49 year old female who presents to clinic today for the following health issues:    HPI       Hospital Follow-up Visit:    Hospital/Nursing Home/IP Rehab Facility: [facility]  Date of Admission: 02/16/2020  Date of Discharge: 02/17/2020  Reason(s) for Admission: Transient global amnesia, history of seizure            Problems taking medications regularly:  None       Medication changes since discharge: None       Problems adhering to non-medication therapy:  None    Summary of hospitalization:  [facility] discharge summary reviewed  \"Principal Problem:    Transient global amnesia    Assessment: Developing rapidly and characterized by disorientation as well as repetitive questioning but resolving within 24 hours of onset with patient now alert and oriented x4.  She does not remember the events of the 2 days leading up to this hospitalization but is able to clearly remember the events that have happened last evening as well as this morning and events that occurred prior to 2 days ago without difficulty.  Her family feels she is back to her previous baseline.  Patient does continue to have a mild headache as well as some blurry vision when attempting to focus far away and had 2 episodes of dizziness upon standing.  CT of the head was negative, CTA of the head and neck was negative, MRI of the head is unremarkable.  Did discuss with neurology again today and it is felt that transient global amnesia is the most likely etiology for the symptoms however given her seizure history focal seizure cannot be ruled out and they are recommending outpatient follow-up as outlined below    Plan: Patient will discharge home with family support.  She will have outpatient EGD and neurology follow-up for further evaluation of possible seizure etiology, however not strongly suspected at this time and neurology is not recommending any antiseizure medications at time of discharge.  " Patient and her family are aware that if she has any recurrent confusion prior to this follow-up, she will need to return to the emergency room for reevaluation to see if transfer to facilitate more rapid evaluation should occur.      Active Problems:    History of seizures    Assessment: Previous history with seizures resolving following surgical intervention in 2002 and patient has been off antiseizure medication since shortly after that surgery occurred..  Given the episodic confusion as above, neurology does have some concern for possible focal seizure activity and is recommending outpatient EEG with neurology follow-up in the near future    Plan: Orders have been placed for EEG and neurology follow-up.  No antiseizure medication as recommended by neurology at time of discharge.       Fall on ice    Assessment: Occurring the day prior to acute confusion with patient having no memory of this fall even now or the events that followed up until she was in the hospital last evening,  fall was witnessed by her  and patient did not strike her head or lose consciousness.  She is not having any pain or stiffness following the fall    Plan: No acute intervention is needed.  Concussion education and precautions have been given as outlined below       Headache, blurry vision, dizziness upon standing    Assessment: Concerning for possible concussion given her fall however patient does not have any nausea, difficulty concentrating and even if concussion was present it would not explain the transient global amnesia as above    Plan: Patient has underwent physical therapy evaluation and they have provided her with concussion precautions.  Patient will follow-up closely in the clinical setting with consideration of outpatient PT and OT evaluation for further concussion management if her symptoms start to worsen going forward.       GERD (gastroesophageal reflux disease)    Assessment: Chronic and stable    Plan: Discharge  "without change to home regimen       Obesity (BMI 35.0-39.9) with comorbidity (H)    Assessment: Chronic and stable    Plan: No acute intervention needed the patient would benefit from weight loss going forward       CKD (chronic kidney disease) stage 3, GFR 30-59 ml/min (H)    Assessment: Chronic and stable    Plan: Continue routine outpatient follow-up to ensure ongoing stability\"    Diagnostic Tests/Treatments reviewed.  Follow up needed: See Neurology.   Other Healthcare Providers Involved in Patient s Care:         None  Update since discharge: improved.     Post Discharge Medication Reconciliation: discharge medications reconciled, continue medications without change.  Plan of care communicated with patient     Coding guidelines for this visit:  Type of Medical   Decision Making Face-to-Face Visit       within 7 Days of discharge Face-to-Face Visit        within 14 days of discharge   Moderate Complexity [id number]   High Complexity [id number]            - on Saturday she remembers slightly that she was going to go eat with her , they walked out to the car and she walked around and slipped.  She was able to get up unsure if she hurt herself.  She then remembers that he went to work later that day but her memory of that morning is still not clear.  Her memory concerns seemed to start prior to the fall.  Then on Sunday she remembers portions of the morning but then after she got home she didn't remember anything and she kept asking questions over and over again.  Her  brought her to the ER that night.   - When she  Was discharged that night she still didn't remember anything. She woke up every hour and asked her  questions, what they weren't telling her, etc.  Her memory seemed to be better the next day.  She has gotten slowly better each day.  Has had occasional headaches that are bilateral frontal.  Thinks maybe it has to do with not drinking her coke in the morning like she normally " "does. No associated paresthesias, difficulty moving/speaking, vision loss.  She does get blurry vision for a little bit when she has the headache.   - No trauma to head otherwise.   - No new falls. Doesn't feel like she is having any issues with balance.   - No recent illness or travel.   - Denies fevers, chills, congestion, sinus pressure/pain, sore throat, cough, chest pain, shortness of breath, cough.    - She is getting CT done due to abdominal bloating, distension and hardening symptoms which were similar to when she had the mass.     Current Outpatient Medications   Medication Sig Dispense Refill     omeprazole (PRILOSEC) 10 MG DR capsule Take 40 mg by mouth every morning        acetaminophen (TYLENOL) 325 MG tablet Take 2 tablets (650 mg) by mouth every 6 hours (Patient not taking: Reported on 2/21/2020) 12 tablet 0     ibuprofen (ADVIL/MOTRIN) 200 MG capsule Take 400 mg by mouth as needed for fever       Allergies   Allergen Reactions     Aspirin Rash     Rash on face       Reviewed and updated as needed this visit by Provider  Tobacco  Allergies  Meds  Problems  Med Hx  Surg Hx  Fam Hx         Review of Systems   ROS COMP: Constitutional, HEENT, cardiovascular, pulmonary, GI, , musculoskeletal, neuro, skin, endocrine and psych systems are negative, except as otherwise noted.      Objective    /88   Pulse 66   Temp 97  F (36.1  C) (Temporal)   Ht 1.645 m (5' 4.76\")   Wt 106.8 kg (235 lb 8 oz)   LMP 09/19/2011   SpO2 99%   BMI 39.48 kg/m    Body mass index is 39.48 kg/m .  Physical Exam   GENERAL: healthy, alert and no distress  NECK: no adenopathy, no asymmetry, masses, or scars and thyroid normal to palpation  RESP: lungs clear to auscultation - no rales, rhonchi or wheezes  CV: regular rate and rhythm, normal S1 S2, no S3 or S4, no murmur, click or rub, no peripheral edema and peripheral pulses strong  MS: no gross musculoskeletal defects noted, no edema  NEURO: Normal strength and " tone, sensory exam grossly normal, mentation intact, speech normal, cranial nerves 2-12 intact and gait normal  PSYCH: mentation appears normal, affect normal/bright    Diagnostic Test Results:  Labs reviewed in Epic        Assessment & Plan       ICD-10-CM    1. Hospital discharge follow-up Z09    2. Transient global amnesia G45.4    3. History of seizures Z87.898    4. CARDIOVASCULAR SCREENING; LDL GOAL LESS THAN 160 Z13.6 Lipid panel reflex to direct LDL Non-fasting   5. CKD (chronic kidney disease) stage 3, GFR 30-59 ml/min (H) N18.3 Basic metabolic panel   6. Acute nonintractable headache, unspecified headache type R51         - Rechecking her BMP to make sure creatinine has either improved or is stable.   - Due for lipids.  Will check today.   - Declined tetanus today, will do when she is feeling better.   - She has neurology appointment in March. She had EEG done.  Possibly seizures are occurring.  She had extensive work-up which was negative.   - She will treat with tylenol/ibuprofen, rest.   - Encouraged to stay hydrated and encouraged to work on modifying diet, given list of foods that can induce more headaches and encouraged to avoid processed foods as she mentioned going to eat fast food multiple times over the weekend alone.     Return in about 1 month (around 3/21/2020) for Recheck, sooner if worse or new concerns. .     Options for treatment and follow-up care were reviewed with the patient and/or guardian. Patient and/or guardian engaged in the decision making process and verbalized understanding of the options discussed and agreed with the final plan.     Suellen Millard PA-C  [facility]

## 2020-02-17 NOTE — ED TRIAGE NOTES
Pt presents with memory loss and repetitive questions Pt states fall yesterday on the ice. Pt doesn't remember incident.  noted this at 1300. Code stroke called. History of seizure's many years ago with surgery. History of benign abdominal tumor. Pt does not remember going to [employer] this morning or she had breakfast this morning.
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
## 2020-02-17 NOTE — ED PROVIDER NOTES
"  History     Chief Complaint   Patient presents with     Memory Loss     Code stroke called.      The history is provided by the spouse and a friend.     Sailaja Mcgee is a 49 year old female who is presenting to the emergency department with complaints of memory loss. History is limited due to acute memory loss. The patient's  and good friend are here with her and report \"never seeing her like this before\".     She is able to recognize her  and friend, but is unable to recall names. Her  says that he drives trucks for work and that today the patient was calling him every ten minutes, which is unusual. This started around 1300. She has also been asking what all of the \"stuff\" in their house was. Her friend mentions that she collects antiques and has a lot around the house, which she did not recognize.     The patient and her friend went out to [employer], and [employer] this morning, none of which the patient remembers. She has been able to function normally, but is unable to remember anything. The patient's  returned home and found her in the bedroom asking when they got the television. He notes that it has been hanging on their wall for about six months. He says she walked out and put her coat on, then came back to the room and asked about the television again.     The patient is able to recall the day of the week, but does not know the date or month and thinks the year is 2014. She denies having pain anywhere or a headache. She was able to walk in to the ED on her own without weakness or numbness. She denies use of street drugs or alcohol.    The patient's  mentions that she slipped on the ice and fell yesterday. He is unsure if she hit her head, but says a plastic straw was \"stabbed\" into the roof of her mouth. The patient does not remember falling or hitting her head.       Allergies:  Allergies   Allergen Reactions     Aspirin Rash     Rash on face       Problem List:  "   Patient Active Problem List    Diagnosis Date Noted     Transient global amnesia 02/16/2020     Priority: Medium     Difficult intubation 07/15/2019     Priority: Medium     S/P BSO (bilateral salpingo-oophorectomy) 07/15/2019     Priority: Medium     Right lower quadrant abdominal mass 06/25/2019     Priority: Medium     CKD (chronic kidney disease) stage 3, GFR 30-59 ml/min (H) 06/11/2019     Priority: Medium     Obesity (BMI 35.0-39.9) with comorbidity (H) 06/10/2019     Priority: Medium     Anemia 01/11/2014     Priority: Medium     Donor of kidney for transplant 01/09/2014     Priority: Medium     S/P laparoscopic hysterectomy 10/24/2011     Priority: Medium     For menorrhagia.   Cervix and uterus removed.          Dysmenorrhea 09/28/2011     Priority: Medium     Menorrhagia 09/28/2011     Priority: Medium     CARDIOVASCULAR SCREENING; LDL GOAL LESS THAN 160 10/31/2010     Priority: Medium     GERD (gastroesophageal reflux disease) 01/11/2010     Priority: Medium        Past Medical History:    Past Medical History:   Diagnosis Date     CKD (chronic kidney disease)      Difficult intubation 7/15/2019     Ovarian tumor of borderline malignancy, left      Seizure (H) 2002     Solitary kidney 4/2009       Past Surgical History:    Past Surgical History:   Procedure Laterality Date     C LAP,DONOR KIDNEY REMOV,LIVING  4/2/2009    Left laparoscopic donor nephrectomy.  Donating to brother. [facility].     COLONOSCOPY N/A 7/10/2019    Procedure: COLONOSCOPY;  Surgeon: Sheba Tenorio MD;  Location: [facility] OR     CYSTOSCOPY  10/24/2011    Procedure:CYSTOSCOPY; Surgeon:NAYELY MADRIGAL; Location: OR     HC BRAIN MAPPING, 1ST HOUR MD ATTENDANCE  2001    ablation of seizure focus     HC REDUCTION OF LARGE BREAST  2001     HYSTERECTOMY TOTAL ABD, [place] SALPINGO-OOPHORECTOMY, NODE DISSECTION, TUMOR DEBULKING, COMBINED Bilateral 7/15/2019    Procedure: Exploratory Laparotomy, Total Abdominal Hysterectomy, Removal Of Both  Tubes And Ovaries, Omentectomy, Appendectomy, Peritoneal Biopsies;  Surgeon: Ivy Soto MD;  Location: [facility] OR     HYSTERECTOMY VAGINAL  10/24/2011    Procedure:HYSTERECTOMY VAGINAL; vaginal hysterectomy and cystoscopy; Surgeon:NAYELY MADRIGAL; Location:[facility] OR     HYSTEROSCOPY,ABLATION ENDOMETRIUM  2001       Family History:    Family History   Problem Relation Age of Onset     Cancer Paternal Grandfather         colon cancer     Diabetes Maternal Grandmother      Cerebrovascular Disease Maternal Grandmother      Arthritis Maternal Grandmother      Arthritis Mother      Cardiovascular Maternal Grandfather         heart attack x2     Heart Disease Maternal Grandfather         heart attack x2     Gastrointestinal Disease Paternal Grandmother         liver failure     Genitourinary Problems Brother         kidney failure     Cancer Father         lung cancer diagnosed 7/11 due to chemical exposure war     Cancer Maternal Aunt      Asthma No family hx of      C.A.D. No family hx of      Hypertension No family hx of      Breast Cancer No family hx of      Cancer - colorectal No family hx of      Prostate Cancer No family hx of      Alzheimer Disease No family hx of      Blood Disease No family hx of      Circulatory No family hx of      Eye Disorder No family hx of      Lipids No family hx of      Musculoskeletal Disorder No family hx of      Neurologic Disorder No family hx of      Respiratory No family hx of      Thyroid Disease No family hx of        Social History:  Marital Status:   [2]  Social History     Tobacco Use     Smoking status: Never Smoker     Smokeless tobacco: Never Used     Tobacco comment: no smokers in the household   Substance Use Topics     Alcohol use: No     Drug use: No        Medications:    acetaminophen (TYLENOL) 325 MG tablet  ibuprofen (ADVIL/MOTRIN) 200 MG capsule  omeprazole (PRILOSEC) 10 MG DR capsule          Review of Systems   All other systems reviewed and are  negative.      Physical Exam   BP: (!) 150/90  Pulse: 90  Resp: 20  SpO2: 99 %      Physical Exam  Constitutional:       General: She is not in acute distress.     Appearance: Normal appearance.   HENT:      Head: Normocephalic and atraumatic.      Comments: No occipital tenderness or  swelling     Mouth/Throat:      Mouth: Mucous membranes are moist.      Pharynx: Oropharynx is clear.   Eyes:      Extraocular Movements: Extraocular movements intact.      Pupils: Pupils are equal, round, and reactive to light.   Cardiovascular:      Rate and Rhythm: Normal rate and regular rhythm.   Pulmonary:      Effort: Pulmonary effort is normal.      Breath sounds: Normal breath sounds.   Musculoskeletal: Normal range of motion.   Skin:     General: Skin is warm and dry.   Neurological:      Mental Status: She is alert. She is disoriented and confused.      GCS: GCS eye subscore is 4. GCS verbal subscore is 4. GCS motor subscore is 6.      Cranial Nerves: Cranial nerves are intact.      Sensory: Sensation is intact.      Motor: Motor function is intact.      Gait: Gait is intact.         ED Course  (with Medical Decision Making)    49-year-old female with acute short-term memory loss starting earlier today possibly around 1:00 PM when she kept calling her  every 10 minutes to see where he was at.  She does not recall shopping or going to [employer] with her friend.  Keeps asking same questions about items in the house.    She did slip and fall yesterday on the ice.  May have hit her head.  Denies any headache, focal weakness or numbness.    Code stroke was called from triage.  She was sent to CT because of the possible head trauma yesterday.  I suspect she may have transient global amnesia.  I did speak with Dr. Harris from the stroke team at the [facility].  MRI as part of the work-up for TGA but that could be accomplished tomorrow assuming the CT is normal.      CT and CTA were both normal.  She still has  not cleared.  Her blood work is unremarkable.  She has followed with [facility] in the past.  [facility] comes up here for outpatient follow-up.  I spoke with Dr. Polanco from neurology who was quite helpful.  She does recommend keeping her on observation at least until she clears and getting an MRI with and without contrast.  That can be done in the morning.  If that is all normal she can follow-up as an outpatient.  Could consider an outpatient EEG.  Typically these clear within 24 hours or so.      Urine was clear.  U tox negative.    I spoke with Dr. Hurtado from the hospitalist service.  He has assumed her care and will write orders.          Procedures               EKG Interpretation:      Interpreted by Eliazar Wright MD  Time reviewed: 2000  Symptoms at time of EKG: ST Memory Loss   Rhythm: normal sinus   Rate: 88  Axis: Normal  Ectopy: none  Conduction: normal  ST Segments/ T Waves: No acute ischemic changes. Low voltage in precordial leads  Q Waves: none  Comparison to prior: No old EKG available    Clinical Impression: low voltage, otherwise normal EKG                Critical Care time:  none               Results for orders placed or performed during the hospital encounter of 02/16/20 (from the past 24 hour(s))   Glucose by meter   Result Value Ref Range    Glucose 107 (H) 70 - 99 mg/dL   CBC with platelets differential   Result Value Ref Range    WBC 9.1 4.0 - 11.0 10e9/L    RBC Count 4.92 3.8 - 5.2 10e12/L    Hemoglobin 13.1 11.7 - 15.7 g/dL    Hematocrit 41.3 35.0 - 47.0 %    MCV 84 78 - 100 fl    MCH 26.6 26.5 - 33.0 pg    MCHC 31.7 31.5 - 36.5 g/dL    RDW 14.0 10.0 - 15.0 %    Platelet Count 367 150 - 450 10e9/L    Diff Method Automated Method     % Neutrophils 58.7 %    % Lymphocytes 31.7 %    % Monocytes 6.4 %    % Eosinophils 2.1 %    % Basophils 0.4 %    % Immature Granulocytes 0.7 %    Nucleated RBCs 0 0 /100    Absolute Neutrophil 5.3 1.6 - 8.3 10e9/L    Absolute  Lymphocytes 2.9 0.8 - 5.3 10e9/L    Absolute Monocytes 0.6 0.0 - 1.3 10e9/L    Absolute Basophils 0.0 0.0 - 0.2 10e9/L    Abs Immature Granulocytes 0.1 0 - 0.4 10e9/L    Absolute Nucleated RBC 0.0    Basic metabolic panel   Result Value Ref Range    Sodium 140 133 - 144 mmol/L    Potassium 3.9 3.4 - 5.3 mmol/L    Chloride 106 94 - 109 mmol/L    Carbon Dioxide 26 20 - 32 mmol/L    Anion Gap 8 3 - 14 mmol/L    Glucose 118 (H) 70 - 99 mg/dL    Urea Nitrogen 21 7 - 30 mg/dL    Creatinine 1.27 (H) 0.52 - 1.04 mg/dL    GFR Estimate 50 (L) >60 mL/min/[1.73_m2]    GFR Estimate If Black 57 (L) >60 mL/min/[1.73_m2]    Calcium 8.6 8.5 - 10.1 mg/dL   INR   Result Value Ref Range    INR 1.07 0.86 - 1.14   Partial thromboplastin time   Result Value Ref Range    PTT 32 22 - 37 sec   Troponin I   Result Value Ref Range    Troponin I ES <0.015 0.000 - 0.045 ug/L   Alcohol ethyl   Result Value Ref Range    Ethanol g/dL <0.01 <0.01 g/dL   CT Head w/o Contrast    Narrative    CT SCAN OF THE HEAD WITHOUT CONTRAST   2/16/2020 7:32 PM     HISTORY: Acute memory loss today, fell on ice and may have hit back of  head yesterday.    TECHNIQUE:  Axial images of the head and coronal reformations without  IV contrast material. Radiation dose for this scan was reduced using  automated exposure control, adjustment of the mA and/or kV according  to patient size, or iterative reconstruction technique.    COMPARISON: None.    FINDINGS: Postsurgical changes of left frontotemporal craniotomy are  noted. There is a large area of encephalomalacia involving the left  anterior temporal lobe, that may represent sequela of previous  anterior temporal lobectomy and amygdalohippocampectomy. Mild  hypoattenuation of the periventricular white matter may represent  sequela of chronic small vessel ischemic disease. No CT findings of  extended acute infarct, intracranial hemorrhage or mass effect. The  ventricles are normal in size and configuration.    Orbits  appear normal. The visualized paranasal sinuses are free of  significant disease. Probable periapical abscess involving the left  maxillary central incisor, incompletely evaluated. Mastoid and middle  ear cavities clear.      Impression    IMPRESSION:  1. No evidence for acute intracranial hemorrhage or extended acute  infarct signs.  2. Postsurgical changes, as detailed.    MINDA THURMAN MD   CTA Head Neck with Contrast    Narrative    CT ANGIOGRAM OF THE HEAD AND NECK WITH CONTRAST  2/16/2020 7:48 PM     HISTORY: Acute memory loss today, fell on ice and may have hit back of  head yesterday.     TECHNIQUE:  CT angiography with an injection of 80mL Isovue 370 IV  with scans through the head and neck. Images were transferred to a  separate 3-D workstation where multiplanar reformations and 3-D images  were created. Estimates of carotid stenoses are made relative to the  distal internal carotid artery diameters except as noted. Radiation  dose for this scan was reduced using automated exposure control,  adjustment of the mA and/or kV according to patient size, or iterative  reconstruction technique.    COMPARISON: CT head of same date.    CT HEAD FINDINGS: Postsurgical changes in the left middle cranial  fossa with encephalomalacia of the left anterior temporal lobe likely  related to prior resection in the setting of previous left  frontotemporal craniotomy. No contrast enhancing lesions. Cerebral  blood flow is grossly normal.     CT ANGIOGRAM HEAD FINDINGS:  The major intracranial arteries including  the proximal branches of the anterior cerebral, middle cerebral, and  posterior cerebral arteries appear patent without vascular cutoff. No  aneurysm identified. No significant stenosis. Venous circulation is  unremarkable.     CT ANGIOGRAM NECK FINDINGS:   Normal origin of the great vessels from the aortic arch.     Right carotid artery: The right common and internal carotid arteries  are patent. No significant  stenosis or atherosclerotic disease in the  carotid artery.     Left carotid artery: The left common and internal carotid arteries are  patent. No significant stenosis or atherosclerotic disease in the  carotid artery.     Vertebral arteries: Vertebral arteries are patent without evidence of  dissection. No significant stenosis. The left vertebral artery is  dominant.    Other findings: Calcified subcarinal lymph nodes, potentially related  to old granulomatous disease. Mildly prominent bilateral upper  cervical lymph nodes and periparotid lymph nodes are presumably  reactive.      Impression    IMPRESSION: Patent arteries in the head and neck without vascular  cutoff. No evidence of dissection. No aneurysm identified. No  significant stenosis.    MINDA THURMAN MD   UA with Microscopic   Result Value Ref Range    Color Urine Colorless     Appearance Urine Clear     Glucose Urine Negative NEG^Negative mg/dL    Bilirubin Urine Negative NEG^Negative    Ketones Urine Negative NEG^Negative mg/dL    Specific Gravity Urine 1.014 1.003 - 1.035    Blood Urine Negative NEG^Negative    pH Urine 6.0 5.0 - 7.0 pH    Protein Albumin Urine Negative NEG^Negative mg/dL    Urobilinogen mg/dL 0.0 0.0 - 2.0 mg/dL    Nitrite Urine Negative NEG^Negative    Leukocyte Esterase Urine Negative NEG^Negative    Source Unspecified Urine     WBC Urine <1 0 - 5 /HPF    RBC Urine <1 0 - 2 /HPF    Bacteria Urine Few (A) NEG^Negative /HPF   Urine Drugs of Abuse Screen Panel 13   Result Value Ref Range    Cannabinoids (65-acu-9-carboxy-9-THC) Not Detected NDET^Not Detected ng/mL    Phencyclidine (Phencyclidine) Not Detected NDET^Not Detected ng/mL    Cocaine (Benzoylecgonine) Not Detected NDET^Not Detected ng/mL    Methamphetamine (d-Methamphetamine) Not Detected NDET^Not Detected ng/mL    Opiates (Morphine) Not Detected NDET^Not Detected ng/mL    Amphetamine (d-Amphetamine) Not Detected NDET^Not Detected ng/mL    Benzodiazepines (Nordiazepam) Not  Detected NDET^Not Detected ng/mL    Tricyclic Antidepressants (Desipramine) Not Detected NDET^Not Detected ng/mL    Methadone (Methadone) Not Detected NDET^Not Detected ng/mL    Barbiturates (Butalbital) Not Detected NDET^Not Detected ng/mL    Oxycodone (Oxycodone) Not Detected NDET^Not Detected ng/mL    Propoxyphene (Norpropoxyphene) Not Detected NDET^Not Detected ng/mL    Buprenorphine (Buprenorphine) Not Detected NDET^Not Detected ng/mL       Medications   0.9% sodium chloride BOLUS (0 mLs Intravenous Stopped 2/16/20 2226)   iopamidol (ISOVUE-370) solution 100 mL (100 mLs Intravenous Given 2/16/20 1936)   Sodium Chloride 0.9 % bag 100mL for CT scan (100 mLs Intravenous Given 2/16/20 1936)   sodium chloride (PF) 0.9% PF flush 3 mL (100 mLs Intracatheter Given 2/16/20 1937)   acetaminophen (TYLENOL) tablet 975 mg (975 mg Oral Given 2/16/20 2109)       Assessments & Plan      I have reviewed the nursing notes.    I have reviewed the findings, diagnosis, plan and need for follow up with the patient.       ED to Inpatient Handoff:    Discussed with Dr Hurtado at 9:57 PM   Patient accepted for Observation Stay  Pending studies include MRI with and without contrast tomorrow  Code Status: Not Addressed           New Prescriptions    No medications on file       Final diagnoses:   TGA (transient global amnesia)           This document serves as a record of services personally performed by Eliazar Wright,*. It was created on their behalf by Madelin Jaffe, a trained medical scribe. The creation of this record is based on the provider's personal observations and the statements of the patient. This document has been checked and approved by the attending provider.  Note: Chart documentation done in part with Dragon Voice Recognition software. Although reviewed after completion, some word and grammatical errors may remain.  2/16/2020   [facility] EMERGENCY DEPARTMENT     Eliazar Wright MD  02/17/20  0017

## 2020-02-17 NOTE — DISCHARGE INSTRUCTIONS
Appointment set up with EEG at 9:30 on Feb 20th Thursday;  [place] Address is [address] [place] [address]  Please make sure your hair is clean and dry and free of any product.       Appointment setup with Dr. Park March 25th @ 10:45 [place] Address is [address] [place] [address]

## 2020-02-17 NOTE — PROGRESS NOTES
"S-(situation): End of shift note    B-(background): Transient Global Amnesia    A-(assessment): Vital signs stable. BP (!) 149/98   Pulse 77   Temp 97.9  F (36.6  C) (Oral)   Resp 16   Ht 1.651 m (5' 5\")   Wt 107.1 kg (236 lb 3.2 oz)   LMP 09/19/2011   SpO2 100%   BMI 39.31 kg/m  .    Afebrile. Lung sounds CTA B/L. On RA. A&O x 4. Neuro's intact. PERRLA. Seizure pads in place. Speech clear and answering questions appropriately. Ambulating independently with SBA. MRI questionnaire completed and in chart.   Voiding adequately. Able to make needs known and uses call light appropriately.     R-(recommendations): Continue to monitor per care plan.     "

## 2020-02-17 NOTE — H&P
[facility]    History and Physical  Hospitalist       Date of Admission:  2/16/2020    Assessment & Plan   Principal Problem:    Transient global amnesia    Assessment: unsure etiology, head and neck CTA and head ct is normal. Neuro exam is normal too. Neurology consulted over the phone. Recommend admit under observation and MRI in am,     Plan: will do MRi in Am, put on neurocheck q4h, will put also on seizure precaution     Active Problems:    GERD (gastroesophageal reflux disease)    Assessment: on omeprazole     Plan: put on protonix during hospital stay      CKD (chronic kidney disease) stage 3, GFR 30-59 ml/min (H)    Assessment: cr is 1.2, baseline,     Plan: monitor only, renally dose medication      Sailaja Mcgee is a 49 year old female who presents with amensia. Started today, unsure how it started, brought by  and good friend. Pt complain of headache. Head ct is normal. Head and neck CTA also normal. 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 Pt fall and hit her head 2 days ago but no sign of trauma on exam       # Pain Assessment:  Current Pain Score 7/18/2019   Patient currently in pain? yes   Pain score (0-10) -   Pain location -   Pain descriptors Sore;Aching   - Sailaja is experiencing pain due to headache. Pain management was discussed and the plan was created in a collaborative fashion.  Sailaja's response to the current recommendations: engaged  - will put on tylenol        DVT Prophylaxis: Low Risk/Ambulatory with no VTE prophylaxis indicated  Code Status: Full Code    Disposition: Expected discharge in 1 days once MRI is normal. And no sign of other neuro change.    Heather Hurtado    Primary Care Physician   [facility]    Chief Complaint   Headache, amnesia     History is obtained from the patient,  and daughter    History of Present Illness     Sailaja Mcgee is a 49 year old female who presents  with amensia. Started today, unsure how it started, brought by  and good friend. Pt complain of headache. Head ct is normal. Head and neck CTA also normal. 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  Past Surgical History:   Procedure Laterality Date     C LAP,DONOR KIDNEY REMOV,LIVING  4/2/2009    Left laparoscopic donor nephrectomy.  Donating to brother. [facility].     COLONOSCOPY N/A 7/10/2019    Procedure: COLONOSCOPY;  Surgeon: Sheba Tenorio MD;  Location: [facility] OR     CYSTOSCOPY  10/24/2011    Procedure:CYSTOSCOPY; Surgeon:NAYELY MADRIGAL; Location:[facility] OR     HC BRAIN MAPPING, 1ST HOUR MD ATTENDANCE  2001    ablation of seizure focus     HC REDUCTION OF LARGE BREAST  2001     HYSTERECTOMY TOTAL ABD, [place] SALPINGO-OOPHORECTOMY, NODE DISSECTION, TUMOR DEBULKING, COMBINED Bilateral 7/15/2019    Procedure: Exploratory Laparotomy, Total Abdominal Hysterectomy, Removal Of Both Tubes And Ovaries, Omentectomy, Appendectomy, Peritoneal Biopsies;  Surgeon: Ivy Soto MD;  Location: [facility] OR     HYSTERECTOMY VAGINAL  10/24/2011    Procedure:HYSTERECTOMY VAGINAL; vaginal hysterectomy and cystoscopy; Surgeon:NAYELY MADRIGAL; Location:[facility] OR     HYSTEROSCOPY,ABLATION ENDOMETRIUM  2001       Prior to Admission Medications   Prior to Admission Medications   Prescriptions Last Dose Informant Patient Reported? Taking?   acetaminophen  (TYLENOL) 325 MG tablet 2/16/2020 at 2030  No Yes   Sig: Take 2 tablets (650 mg) by mouth every 6 hours   ibuprofen (ADVIL/MOTRIN) 200 MG capsule Past Week at Unknown time  Yes Yes   Sig: Take 400 mg by mouth as needed for fever   omeprazole (PRILOSEC) 10 MG DR capsule 2/15/2020 at Unknown time  Yes Yes   Sig: Take 40 mg by mouth every morning       Facility-Administered Medications: None     Allergies   Allergies   Allergen Reactions     Aspirin Rash     Rash on face       Social History   I have reviewed this patient's social history and updated it with pertinent information if needed. Sailaja Mcgee  reports that she has never smoked. She has never used smokeless tobacco. She reports that she does not drink alcohol or use drugs.    Family History   I have reviewed this patient's family history and updated it with pertinent information if needed.   Family History   Problem Relation Age of Onset     Cancer Paternal Grandfather         colon cancer     Diabetes Maternal Grandmother      Cerebrovascular Disease Maternal Grandmother      Arthritis Maternal Grandmother      Arthritis Mother      Cardiovascular Maternal Grandfather         heart attack x2     Heart Disease Maternal Grandfather         heart attack x2     Gastrointestinal Disease Paternal Grandmother         liver failure     Genitourinary Problems Brother         kidney failure     Cancer Father         lung cancer diagnosed 7/11 due to chemical exposure war     Cancer Maternal Aunt      Asthma No family hx of      C.A.D. No family hx of      Hypertension No family hx of      Breast Cancer No family hx of      Cancer - colorectal No family hx of      Prostate Cancer No family hx of      Alzheimer Disease No family hx of      Blood Disease No family hx of      Circulatory No family hx of      Eye Disorder No family hx of      Lipids No family hx of      Musculoskeletal Disorder No family hx of      Neurologic Disorder No family hx of      Respiratory No  family hx of      Thyroid Disease No family hx of        Review of Systems   The 10 point Review of Systems is negative other than noted in the HPI or here.     Physical Exam       BP: (!) 152/91 Pulse: 91   Resp: 20 SpO2: 99 % O2 Device: None (Room air)    Vital Signs with Ranges  Pulse:  [90-91] 91  Resp:  [20] 20  BP: (150-152)/(90-91) 152/91  SpO2:  [99 %] 99 %  0 lbs 0 oz    Constitutional: alert, oriented to person and place but confused to time, normal speech,   Eyes: PERRLA  HEENT: normocephalic atraumatic. ENT normal  Respiratory: clear to auscultation bilaterally  Cardiovascular:  Regular rate and rhythm  GI: supple, non tender non distended, normal bowel sound  Lymph/Hematologic: no lymph node enlargement  Genitourinary: not examined  Skin: no rash or bruise  Musculoskeletal: ROM is intact, no leg edema  Neurologic: alert, oriented to person and place but not time. Normal speech. strength in upper and lower extremity normal. Cranial nerve II-XII is grossly normal  Psychiatric: normal affect     Data     Data reviewed today:    Recent Results (from the past 168 hour(s))   Glucose by meter    Collection Time: 02/16/20  7:18 PM   Result Value Ref Range    Glucose 107 (H) 70 - 99 mg/dL   CBC with platelets differential    Collection Time: 02/16/20  7:29 PM   Result Value Ref Range    WBC 9.1 4.0 - 11.0 10e9/L    RBC Count 4.92 3.8 - 5.2 10e12/L    Hemoglobin 13.1 11.7 - 15.7 g/dL    Hematocrit 41.3 35.0 - 47.0 %    MCV 84 78 - 100 fl    MCH 26.6 26.5 - 33.0 pg    MCHC 31.7 31.5 - 36.5 g/dL    RDW 14.0 10.0 - 15.0 %    Platelet Count 367 150 - 450 10e9/L    Diff Method Automated Method     % Neutrophils 58.7 %    % Lymphocytes 31.7 %    % Monocytes 6.4 %    % Eosinophils 2.1 %    % Basophils 0.4 %    % Immature Granulocytes 0.7 %    Nucleated RBCs 0 0 /100    Absolute Neutrophil 5.3 1.6 - 8.3 10e9/L    Absolute Lymphocytes 2.9 0.8 - 5.3 10e9/L    Absolute Monocytes 0.6 0.0 - 1.3 10e9/L    Absolute Basophils  0.0 0.0 - 0.2 10e9/L    Abs Immature Granulocytes 0.1 0 - 0.4 10e9/L    Absolute Nucleated RBC 0.0    Basic metabolic panel    Collection Time: 02/16/20  7:29 PM   Result Value Ref Range    Sodium 140 133 - 144 mmol/L    Potassium 3.9 3.4 - 5.3 mmol/L    Chloride 106 94 - 109 mmol/L    Carbon Dioxide 26 20 - 32 mmol/L    Anion Gap 8 3 - 14 mmol/L    Glucose 118 (H) 70 - 99 mg/dL    Urea Nitrogen 21 7 - 30 mg/dL    Creatinine 1.27 (H) 0.52 - 1.04 mg/dL    GFR Estimate 50 (L) >60 mL/min/[1.73_m2]    GFR Estimate If Black 57 (L) >60 mL/min/[1.73_m2]    Calcium 8.6 8.5 - 10.1 mg/dL   INR    Collection Time: 02/16/20  7:29 PM   Result Value Ref Range    INR 1.07 0.86 - 1.14   Partial thromboplastin time    Collection Time: 02/16/20  7:29 PM   Result Value Ref Range    PTT 32 22 - 37 sec   Troponin I    Collection Time: 02/16/20  7:29 PM   Result Value Ref Range    Troponin I ES <0.015 0.000 - 0.045 ug/L   Alcohol ethyl    Collection Time: 02/16/20  7:29 PM   Result Value Ref Range    Ethanol g/dL <0.01 <0.01 g/dL   UA with Microscopic    Collection Time: 02/16/20  8:56 PM   Result Value Ref Range    Color Urine Colorless     Appearance Urine Clear     Glucose Urine Negative NEG^Negative mg/dL    Bilirubin Urine Negative NEG^Negative    Ketones Urine Negative NEG^Negative mg/dL    Specific Gravity Urine 1.014 1.003 - 1.035    Blood Urine Negative NEG^Negative    pH Urine 6.0 5.0 - 7.0 pH    Protein Albumin Urine Negative NEG^Negative mg/dL    Urobilinogen mg/dL 0.0 0.0 - 2.0 mg/dL    Nitrite Urine Negative NEG^Negative    Leukocyte Esterase Urine Negative NEG^Negative    Source Unspecified Urine     WBC Urine <1 0 - 5 /HPF    RBC Urine <1 0 - 2 /HPF    Bacteria Urine Few (A) NEG^Negative /HPF   Urine Drugs of Abuse Screen Panel 13    Collection Time: 02/16/20  8:56 PM   Result Value Ref Range    Cannabinoids (41-tca-6-carboxy-9-THC) Not Detected NDET^Not Detected ng/mL    Phencyclidine (Phencyclidine) Not Detected  NDET^Not Detected ng/mL    Cocaine (Benzoylecgonine) Not Detected NDET^Not Detected ng/mL    Methamphetamine (d-Methamphetamine) Not Detected NDET^Not Detected ng/mL    Opiates (Morphine) Not Detected NDET^Not Detected ng/mL    Amphetamine (d-Amphetamine) Not Detected NDET^Not Detected ng/mL    Benzodiazepines (Nordiazepam) Not Detected NDET^Not Detected ng/mL    Tricyclic Antidepressants (Desipramine) Not Detected NDET^Not Detected ng/mL    Methadone (Methadone) Not Detected NDET^Not Detected ng/mL    Barbiturates (Butalbital) Not Detected NDET^Not Detected ng/mL    Oxycodone (Oxycodone) Not Detected NDET^Not Detected ng/mL    Propoxyphene (Norpropoxyphene) Not Detected NDET^Not Detected ng/mL    Buprenorphine (Buprenorphine) Not Detected NDET^Not Detected ng/mL      Recent Results (from the past 24 hour(s))   CT Head w/o Contrast    Narrative    CT SCAN OF THE HEAD WITHOUT CONTRAST   2/16/2020 7:32 PM     HISTORY: Acute memory loss today, fell on ice and may have hit back of  head yesterday.    TECHNIQUE:  Axial images of the head and coronal reformations without  IV contrast material. Radiation dose for this scan was reduced using  automated exposure control, adjustment of the mA and/or kV according  to patient size, or iterative reconstruction technique.    COMPARISON: None.    FINDINGS: Postsurgical changes of left frontotemporal craniotomy are  noted. There is a large area of encephalomalacia involving the left  anterior temporal lobe, that may represent sequela of previous  anterior temporal lobectomy and amygdalohippocampectomy. Mild  hypoattenuation of the periventricular white matter may represent  sequela of chronic small vessel ischemic disease. No CT findings of  extended acute infarct, intracranial hemorrhage or mass effect. The  ventricles are normal in size and configuration.    Orbits appear normal. The visualized paranasal sinuses are free of  significant disease. Probable periapical abscess  involving the left  maxillary central incisor, incompletely evaluated. Mastoid and middle  ear cavities clear.      Impression    IMPRESSION:  1. No evidence for acute intracranial hemorrhage or extended acute  infarct signs.  2. Postsurgical changes, as detailed.    MINDA THURMAN MD   CTA Head Neck with Contrast    Narrative    CT ANGIOGRAM OF THE HEAD AND NECK WITH CONTRAST  2/16/2020 7:48 PM     HISTORY: Acute memory loss today, fell on ice and may have hit back of  head yesterday.     TECHNIQUE:  CT angiography with an injection of 80mL Isovue 370 IV  with scans through the head and neck. Images were transferred to a  separate 3-D workstation where multiplanar reformations and 3-D images  were created. Estimates of carotid stenoses are made relative to the  distal internal carotid artery diameters except as noted. Radiation  dose for this scan was reduced using automated exposure control,  adjustment of the mA and/or kV according to patient size, or iterative  reconstruction technique.    COMPARISON: CT head of same date.    CT HEAD FINDINGS: Postsurgical changes in the left middle cranial  fossa with encephalomalacia of the left anterior temporal lobe likely  related to prior resection in the setting of previous left  frontotemporal craniotomy. No contrast enhancing lesions. Cerebral  blood flow is grossly normal.     CT ANGIOGRAM HEAD FINDINGS:  The major intracranial arteries including  the proximal branches of the anterior cerebral, middle cerebral, and  posterior cerebral arteries appear patent without vascular cutoff. No  aneurysm identified. No significant stenosis. Venous circulation is  unremarkable.     CT ANGIOGRAM NECK FINDINGS:   Normal origin of the great vessels from the aortic arch.     Right carotid artery: The right common and internal carotid arteries  are patent. No significant stenosis or atherosclerotic disease in the  carotid artery.     Left carotid artery: The left common and internal  carotid arteries are  patent. No significant stenosis or atherosclerotic disease in the  carotid artery.     Vertebral arteries: Vertebral arteries are patent without evidence of  dissection. No significant stenosis. The left vertebral artery is  dominant.    Other findings: Calcified subcarinal lymph nodes, potentially related  to old granulomatous disease. Mildly prominent bilateral upper  cervical lymph nodes and periparotid lymph nodes are presumably  reactive.      Impression    IMPRESSION: Patent arteries in the head and neck without vascular  cutoff. No evidence of dissection. No aneurysm identified. No  significant stenosis.

## 2020-02-17 NOTE — PLAN OF CARE
VSS this shift. Neuros WDL. Pt is c/o headache, she believes is related to lack of caffeine. MRI completed and per MD okay for pt to have caffeine again and ibuprofen ordered and given with improvement in symptoms. Seizure pads in place per orders. Pt up with SBA to bathroom will continue to monitor.
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
## 2020-02-17 NOTE — DISCHARGE SUMMARY
[facility]  Hospitalist Discharge Summary       Date of Admission:  2/16/2020  Date of Discharge:  2/17/2020  Discharging Provider: Chen Baird MD      Discharge Diagnoses   Principal Problem:    Transient global amnesia  Active Problems:    GERD (gastroesophageal reflux disease)    Obesity (BMI 35.0-39.9) with comorbidity (H)    CKD (chronic kidney disease) stage 3, GFR 30-59 ml/min (H)    Amnesia, global, transient      Follow-ups Needed After Discharge   Follow-up Appointments     Follow-up and recommended labs and tests       Follow up with EEG and neurology as scheduled             Discharge Disposition   Discharged to home  Condition at discharge: Stable    Hospital Course   Principal Problem:    Transient global amnesia    Assessment: Developing rapidly and characterized by disorientation as well as repetitive questioning but resolving within 24 hours of onset with patient now alert and oriented x4.  She does not remember the events of the 2 days leading up to this hospitalization but is able to clearly remember the events that have happened last evening as well as this morning and events that occurred prior to 2 days ago without difficulty.  Her family feels she is back to her previous baseline.  Patient does continue to have a mild headache as well as some blurry vision when attempting to focus far away and had 2 episodes of dizziness upon standing.  CT of the head was negative, CTA of the head and neck was negative, MRI of the head is unremarkable.  Did discuss with neurology again today and it is felt that transient global amnesia is the most likely etiology for the symptoms however given her seizure history focal seizure cannot be ruled out and they are recommending outpatient follow-up as outlined below    Plan: Patient will discharge home with family support.  She will have outpatient EGD and neurology follow-up for further evaluation of possible seizure  etiology, however not strongly suspected at this time and neurology is not recommending any antiseizure medications at time of discharge.  Patient and her family are aware that if she has any recurrent confusion prior to this follow-up, she will need to return to the emergency room for reevaluation to see if transfer to facilitate more rapid evaluation should occur.     Active Problems:    History of seizures    Assessment: Previous history with seizures resolving following surgical intervention in 2002 and patient has been off antiseizure medication since shortly after that surgery occurred..  Given the episodic confusion as above, neurology does have some concern for possible focal seizure activity and is recommending outpatient EEG with neurology follow-up in the near future    Plan: Orders have been placed for EEG and neurology follow-up.  No antiseizure medication as recommended by neurology at time of discharge.      Fall on ice    Assessment: Occurring the day prior to acute confusion with patient having no memory of this fall even now or the events that followed up until she was in the hospital last evening,  fall was witnessed by her  and patient did not strike her head or lose consciousness.  She is not having any pain or stiffness following the fall    Plan: No acute intervention is needed.  Concussion education and precautions have been given as outlined below      Headache, blurry vision, dizziness upon standing    Assessment: Concerning for possible concussion given her fall however patient does not have any nausea, difficulty concentrating and even if concussion was present it would not explain the transient global amnesia as above    Plan: Patient has underwent physical therapy evaluation and they have provided her with concussion precautions.  Patient will follow-up closely in the clinical setting with consideration of outpatient PT and OT evaluation for further concussion management if her  symptoms start to worsen going forward.      GERD (gastroesophageal reflux disease)    Assessment: Chronic and stable    Plan: Discharge without change to home regimen      Obesity (BMI 35.0-39.9) with comorbidity (H)    Assessment: Chronic and stable    Plan: No acute intervention needed the patient would benefit from weight loss going forward      CKD (chronic kidney disease) stage 3, GFR 30-59 ml/min (H)    Assessment: Chronic and stable    Plan: Continue routine outpatient follow-up to ensure ongoing stability      Consultations This Hospital Stay   PHYSICAL THERAPY ADULT IP CONSULT    Code Status   Full Code    Time Spent on this Encounter   I, Chen Baird MD, personally saw the patient today and spent greater than 30 minutes discharging this patient.       Chen Baird MD  [facility]  ______________________________________________________________________    Physical Exam   Vital Signs: Temp: 97.9  F (36.6  C) Temp src: Oral BP: 135/70 Pulse: 84   Resp: 16 SpO2: 97 % O2 Device: None (Room air)    Weight: 236 lbs 3.2 oz  Constitutional: awake, alert, cooperative, no apparent distress, and appears stated age  Respiratory: No increased work of breathing, good air exchange, clear to auscultation bilaterally, no crackles or wheezing  Cardiovascular: Normal apical impulse, regular rate and rhythm, normal S1 and S2, no S3 or S4, and no murmur noted  GI: Bowel sounds present, abdomen soft and nontender  Musculoskeletal: no lower extremity pitting edema present  Neurologic: Awake, alert, oriented to name, place and time.         Primary Care Physician   [facility]    Discharge Orders      HC EEG ROUTINE     NEUROLOGY ADULT REFERRAL      Follow-up and recommended labs and tests     Follow up with EEG and neurology as scheduled     Reason for your hospital stay    Episode of confusion that is suspected to be a Transient Global Amnesia event, but  cannot rule out concussion or seizure activity.  Please follow up with the neurologist going forward to further evaluate this and return to the emergency room if the confusion starts to worsen significantly again.     Activity    Your activity upon discharge: activity as tolerated but remember to take it easy and slow and avoid stimulation on your brain to allow any concussion to fully heal     Diet    Follow this diet upon discharge: Regular       Significant Results and Procedures   Results for orders placed or performed during the hospital encounter of 02/16/20   CT Head w/o Contrast    Narrative    CT SCAN OF THE HEAD WITHOUT CONTRAST   2/16/2020 7:32 PM     HISTORY: Acute memory loss today, fell on ice and may have hit back of  head yesterday.    TECHNIQUE:  Axial images of the head and coronal reformations without  IV contrast material. Radiation dose for this scan was reduced using  automated exposure control, adjustment of the mA and/or kV according  to patient size, or iterative reconstruction technique.    COMPARISON: None.    FINDINGS: Postsurgical changes of left frontotemporal craniotomy are  noted. There is a large area of encephalomalacia involving the left  anterior temporal lobe, that may represent sequela of previous  anterior temporal lobectomy and amygdalohippocampectomy. Mild  hypoattenuation of the periventricular white matter may represent  sequela of chronic small vessel ischemic disease. No CT findings of  extended acute infarct, intracranial hemorrhage or mass effect. The  ventricles are normal in size and configuration.    Orbits appear normal. The visualized paranasal sinuses are free of  significant disease. Probable periapical abscess involving the left  maxillary central incisor, incompletely evaluated. Mastoid and middle  ear cavities clear.      Impression    IMPRESSION:  1. No evidence for acute intracranial hemorrhage or extended acute  infarct signs.  2. Postsurgical changes, as  detailed.    MINDA THURMAN MD   CTA Head Neck with Contrast    Narrative    CT ANGIOGRAM OF THE HEAD AND NECK WITH CONTRAST  2/16/2020 7:48 PM     HISTORY: Acute memory loss today, fell on ice and may have hit back of  head yesterday.     TECHNIQUE:  CT angiography with an injection of 80mL Isovue 370 IV  with scans through the head and neck. Images were transferred to a  separate 3-D workstation where multiplanar reformations and 3-D images  were created. Estimates of carotid stenoses are made relative to the  distal internal carotid artery diameters except as noted. Radiation  dose for this scan was reduced using automated exposure control,  adjustment of the mA and/or kV according to patient size, or iterative  reconstruction technique.    COMPARISON: CT head of same date.    CT HEAD FINDINGS: Postsurgical changes in the left middle cranial  fossa with encephalomalacia of the left anterior temporal lobe likely  related to prior resection in the setting of previous left  frontotemporal craniotomy. No contrast enhancing lesions. Cerebral  blood flow is grossly normal.     CT ANGIOGRAM HEAD FINDINGS:  The major intracranial arteries including  the proximal branches of the anterior cerebral, middle cerebral, and  posterior cerebral arteries appear patent without vascular cutoff. No  aneurysm identified. No significant stenosis. Venous circulation is  unremarkable.     CT ANGIOGRAM NECK FINDINGS:   Normal origin of the great vessels from the aortic arch.     Right carotid artery: The right common and internal carotid arteries  are patent. No significant stenosis or atherosclerotic disease in the  carotid artery.     Left carotid artery: The left common and internal carotid arteries are  patent. No significant stenosis or atherosclerotic disease in the  carotid artery.     Vertebral arteries: Vertebral arteries are patent without evidence of  dissection. No significant stenosis. The left vertebral artery  is  dominant.    Other findings: Calcified subcarinal lymph nodes, potentially related  to old granulomatous disease. Mildly prominent bilateral upper  cervical lymph nodes and periparotid lymph nodes are presumably  reactive.      Impression    IMPRESSION: Patent arteries in the head and neck without vascular  cutoff. No evidence of dissection. No aneurysm identified. No  significant stenosis.    MINDA THURMAN MD   MR Brain w/o & w Contrast    Narrative    MRI BRAIN WITHOUT AND WITH CONTRAST  2/17/2020 12:19 PM    HISTORY:  Amnesia.     TECHNIQUE:  Multiplanar, multisequence MRI of the brain without and  with 10 mL Gadavist.     COMPARISON: CT studies dated 2/16/2020.    FINDINGS:   Intracranial contents: The patient is status post a left temporal  craniotomy. There has been a resection of the anterior aspect of the  left temporal lobe. There is T2 hyperintensity along the margins of  the resection. This is consistent with postoperative gliosis. There is  no parenchymal enhancement. No focal areas of restricted diffusion are  identified.  There is no evidence of hemorrhage, mass, acute infarct,  or anomaly.  There are no gadolinium enhancing lesions. The arteries  at the base of the brain and the dural venous sinuses appear patent.     Sella:  No significant abnormality accounting for technique.     Orbit: No significant abnormality accounting for technique.      Sinus/mastoids: No significant paranasal sinus mucosal disease. No  significant middle ear or mastoid effusion.    Bones/soft tissues: No aggressive osseous lesion involving the  calvarium, skull base, or visualized upper cervical spine.       Impression    IMPRESSION:    1. No acute pathology. No bleed, mass, or areas of acute infarction  are identified.  2. Patient status post left temporal craniotomy and resection of the  anterior aspect of the left temporal lobe.     PEDRO ORANTES MD       Discharge Medications   Current Discharge Medication List       CONTINUE these medications which have NOT CHANGED    Details   acetaminophen (TYLENOL) 325 MG tablet Take 2 tablets (650 mg) by mouth every 6 hours  Qty: 12 tablet, Refills: 0    Associated Diagnoses: S/P BSO (bilateral salpingo-oophorectomy)      ibuprofen (ADVIL/MOTRIN) 200 MG capsule Take 400 mg by mouth as needed for fever      omeprazole (PRILOSEC) 10 MG DR capsule Take 40 mg by mouth every morning            Allergies   Allergies   Allergen Reactions     Aspirin Rash     Rash on face

## 2020-02-17 NOTE — PROGRESS NOTES
SPIRITUAL HEALTH SERVICES  SPIRITUAL ASSESSMENT Progress Note  [facility]      During Rounding,  introduced himself to Sailaja Mcgee & her daughter and informed her of his availability.    Alex Dangelo M.Div., [facility]  Staff   Office tel: [phone]

## 2020-02-18 ENCOUNTER — TELEPHONE (OUTPATIENT)
Dept: FAMILY MEDICINE | Facility: OTHER | Age: 50
End: 2020-02-18

## 2020-02-18 PROBLEM — Z87.898 HISTORY OF SEIZURES: Status: ACTIVE | Noted: 2020-02-18

## 2020-02-18 PROBLEM — G45.4 AMNESIA, GLOBAL, TRANSIENT: Status: RESOLVED | Noted: 2020-02-17 | Resolved: 2020-02-18

## 2020-02-18 NOTE — PROGRESS NOTES
S-(situation): Patient discharged to home via wheelchair with family    B-(background): Observation goals met     A-(assessment): VSS. A&O x4. C/O mild headache, rated 1/10. Mild blurred vision when looking at phone. Educated this is a concussion symptom. Hand out given on concussion and what to look for. Ambulated indep in room. Denies nausea. Denies dizziness. Neuros WDL. Pupils reactive.     R-(recommendations): Discharge instructions reviewed with patient and . Listed belongings gathered and returned to patient.  Patient Education resolved: Yes  New medications-Pt. Has been educated about reason of use and side effects Yes  Home and hospital acquired medications returned to patient Yes  Medication Bin checked and emptied on discharge Yes

## 2020-02-18 NOTE — TELEPHONE ENCOUNTER
ED / Discharge Outreach Protocol    Patient Contact    Attempt # 1    Was call answered?  No.  Left message on voicemail with information to call me back.      Next 5 appointments (look out 90 days)    Feb 21, 2020  1:00 PM CST  [facility] with Suellen [facility]ard [facility]-C  [facility] [address] [place] [id number]  [phone]        Sachi Le, ANSLEYN, RN, PHN

## 2020-02-18 NOTE — TELEPHONE ENCOUNTER
Reason for follow up call: Sailaja Mcgee appeared on our list for being seen in and recenlty discharge from the Hospital.    Chief Complaint   Patient presents with     Hospital F/U     Tga (Transient Global Amnesia), History Of Seizure       Encounter routed for Clinic RN to call for follow up      Miriam Guevara, MSN, RN

## 2020-02-19 NOTE — TELEPHONE ENCOUNTER
"Hospital/TCU/ED for chronic condition Discharge Protocol    \"Hi, my name is Miriam Guevara, RN, a registered nurse, and I am calling from [facility].  I am calling to follow up and see how things are going for you after your recent emergency visit/hospital/TCU stay.\"    Tell me how you are doing now that you are home?\" doing OK, yesterday was a little in and out but better today.       Discharge Instructions    \"Let's review your discharge instructions.  What is/are the follow-up recommendations?  Pt. Response: no questions at this time.     \"Has an appointment with your primary care provider been scheduled?\"   Yes. (confirm)     Next 5 appointments (look out 90 days)    Feb 21, 2020  1:00 PM CST  [facility] Visit with Suellen Millard PA-C  [facility] [address]  [facility] [id number]  [phone]            \"When you see the provider, I would recommend that you bring your medications with you.\"    Medications    \"Tell me what changed about your medicines when you discharged?\"    Changes to chronic meds?    0-1    \"What questions do you have about your medications?\"    None       Post Discharge Medication Reconciliation Status: discharge medications reconciled, continue medications without change.    Was MTM referral placed (*Make sure to put transitions as reason for referral)?   No    Call Summary    \"What questions or concerns do you have about your recent visit and your follow-up care?\"     none    \"If you have questions or things don't continue to improve, we encourage you contact us through the main clinic number (give number).  Even if the clinic is not open, triage nurses are available 24/7 to help you.     We would like you to know that our clinic has extended hours (provide information).  We also have urgent care (provide details on closest location and hours/contact info)\"      \"Thank you for your time and take care!\"             "

## 2020-02-21 ENCOUNTER — OFFICE VISIT (OUTPATIENT)
Dept: FAMILY MEDICINE | Facility: OTHER | Age: 50
End: 2020-02-21
Payer: COMMERCIAL

## 2020-02-21 VITALS
HEIGHT: 65 IN | SYSTOLIC BLOOD PRESSURE: 124 MMHG | TEMPERATURE: 97 F | WEIGHT: 235.5 LBS | OXYGEN SATURATION: 99 % | HEART RATE: 66 BPM | DIASTOLIC BLOOD PRESSURE: 88 MMHG | BODY MASS INDEX: 39.24 KG/M2

## 2020-02-21 DIAGNOSIS — Z87.898 HISTORY OF SEIZURES: ICD-10-CM

## 2020-02-21 DIAGNOSIS — N18.30 CKD (CHRONIC KIDNEY DISEASE) STAGE 3, GFR 30-59 ML/MIN (H): ICD-10-CM

## 2020-02-21 DIAGNOSIS — Z09 HOSPITAL DISCHARGE FOLLOW-UP: Primary | ICD-10-CM

## 2020-02-21 DIAGNOSIS — Z13.6 CARDIOVASCULAR SCREENING; LDL GOAL LESS THAN 160: ICD-10-CM

## 2020-02-21 DIAGNOSIS — G45.4 TRANSIENT GLOBAL AMNESIA: ICD-10-CM

## 2020-02-21 DIAGNOSIS — R51.9 ACUTE NONINTRACTABLE HEADACHE, UNSPECIFIED HEADACHE TYPE: ICD-10-CM

## 2020-02-21 LAB
ANION GAP SERPL CALCULATED.3IONS-SCNC: 4 MMOL/L (ref 3–14)
BUN SERPL-MCNC: 21 MG/DL (ref 7–30)
CALCIUM SERPL-MCNC: 8.6 MG/DL (ref 8.5–10.1)
CHLORIDE SERPL-SCNC: 108 MMOL/L (ref 94–109)
CHOLEST SERPL-MCNC: 184 MG/DL
CO2 SERPL-SCNC: 29 MMOL/L (ref 20–32)
CREAT SERPL-MCNC: 1.13 MG/DL (ref 0.52–1.04)
GFR SERPL CREATININE-BSD FRML MDRD: 57 ML/MIN/{1.73_M2}
GLUCOSE SERPL-MCNC: 98 MG/DL (ref 70–99)
HDLC SERPL-MCNC: 43 MG/DL
LDLC SERPL CALC-MCNC: 86 MG/DL
NONHDLC SERPL-MCNC: 141 MG/DL
POTASSIUM SERPL-SCNC: 4.2 MMOL/L (ref 3.4–5.3)
SODIUM SERPL-SCNC: 141 MMOL/L (ref 133–144)
TRIGL SERPL-MCNC: 276 MG/DL

## 2020-02-21 PROCEDURE — 80048 BASIC METABOLIC PNL TOTAL CA: CPT | Performed by: PHYSICIAN ASSISTANT

## 2020-02-21 PROCEDURE — 36415 COLL VENOUS BLD VENIPUNCTURE: CPT | Performed by: PHYSICIAN ASSISTANT

## 2020-02-21 PROCEDURE — 80061 LIPID PANEL: CPT | Performed by: PHYSICIAN ASSISTANT

## 2020-02-21 PROCEDURE — 99495 TRANSJ CARE MGMT MOD F2F 14D: CPT | Performed by: PHYSICIAN ASSISTANT

## 2020-02-21 ASSESSMENT — MIFFLIN-ST. JEOR: SCORE: 1695.35

## 2020-02-21 NOTE — PATIENT INSTRUCTIONS
- keep follow-up with Neurologist. Make sure you sign release for the clinic.   - Tylenol 500 mg every 4-6 hours as needed for headache.  - Let me know if headaches are getting worse or new concerns.   - With the headaches:   Avoid these foods if they cause headaches- chocolate, caffeine, MSG, cheese.  Peanuts, peanut butter, other nuts and seeds   Pizza   Potato chip products   Chicken livers and other organ meats   Smoked or dried fish   Sourdough bread, fresh baked yeast goods (donuts, cakes, homemade breads, and rolls)   Bread, crackers, and desserts containing cheese   Certain fresh fruits including ripe bananas, citrus fruits, papaya, red plums, raspberries, kiwi, pineapple   Dried fruits (figs, raisins, dates)   Soups made from meat extracts or bouillon (not homemade broth)   Cultured dairy products, sour cream, buttermilk, yogurt   Caffeine found in chocolate and cocoa; beverages such as coffee, tea and joseph; also found in certain medications   Aspartame and other artificial sweeteners

## 2020-02-24 ENCOUNTER — TELEPHONE (OUTPATIENT)
Dept: FAMILY MEDICINE | Facility: OTHER | Age: 50
End: 2020-02-24

## 2020-02-24 NOTE — TELEPHONE ENCOUNTER
LM for patient to call back for the results below:          Please call patient.  Her kidney function has gotten slightly better, still elevated but in the right direction. Her triglycerides are still high.  Working on diet can help improve this.

## 2020-02-25 ENCOUNTER — HOSPITAL ENCOUNTER (OUTPATIENT)
Dept: CT IMAGING | Facility: CLINIC | Age: 50
Discharge: HOME | End: 2020-02-25
Attending: OBSTETRICS & GYNECOLOGY | Admitting: OBSTETRICS & GYNECOLOGY

## 2020-02-25 DIAGNOSIS — Z85.43 HISTORY OF OVARIAN CANCER: ICD-10-CM

## 2020-02-25 PROCEDURE — 25000125 ZZHC RX 250: Performed by: RADIOLOGY

## 2020-02-25 PROCEDURE — 25000128 H RX IP 250 OP 636: Performed by: RADIOLOGY

## 2020-02-25 PROCEDURE — 74177 CT ABD & PELVIS W/CONTRAST: CPT

## 2020-02-25 RX ORDER — IOPAMIDOL 755 MG/ML
500 INJECTION, SOLUTION INTRAVASCULAR ONCE
Status: COMPLETED | OUTPATIENT
Start: 2020-02-25 | End: 2020-02-25

## 2020-02-25 RX ADMIN — SODIUM CHLORIDE 60 ML: 9 INJECTION, SOLUTION INTRAVENOUS at 11:30

## 2020-02-25 RX ADMIN — IOPAMIDOL 100 ML: 755 INJECTION, SOLUTION INTRAVENOUS at 11:29

## 2020-02-28 ENCOUNTER — TELEPHONE (OUTPATIENT)
Dept: ONCOLOGY | Facility: CLINIC | Age: 50
End: 2020-02-28

## 2020-02-28 NOTE — TELEPHONE ENCOUNTER
Called to review CT scan.   IJEOMA. No cause of bloating/pain identified  Left message    Ivy Soto MD  Gynecologic Oncology  Pager [phone]

## 2020-09-10 ENCOUNTER — TRANSFERRED RECORDS (OUTPATIENT)
Dept: HEALTH INFORMATION MANAGEMENT | Facility: CLINIC | Age: 50
End: 2020-09-10

## 2021-07-21 NOTE — ED NOTES
Pt up to the BR with stand by assist. Continues with short term memory issues and repititive questions.   denies recreational drug use

## 2022-02-23 ENCOUNTER — OFFICE VISIT (OUTPATIENT)
Dept: FAMILY MEDICINE | Facility: OTHER | Age: 52
End: 2022-02-23
Payer: COMMERCIAL

## 2022-02-23 VITALS
HEIGHT: 65 IN | DIASTOLIC BLOOD PRESSURE: 80 MMHG | SYSTOLIC BLOOD PRESSURE: 122 MMHG | BODY MASS INDEX: 40.65 KG/M2 | HEART RATE: 99 BPM | RESPIRATION RATE: 20 BRPM | OXYGEN SATURATION: 99 % | TEMPERATURE: 96.9 F | WEIGHT: 244 LBS

## 2022-02-23 DIAGNOSIS — Z12.31 VISIT FOR SCREENING MAMMOGRAM: ICD-10-CM

## 2022-02-23 DIAGNOSIS — G56.03 BILATERAL CARPAL TUNNEL SYNDROME: ICD-10-CM

## 2022-02-23 DIAGNOSIS — K62.89 MASS OF PERIRECTAL SOFT TISSUE: Primary | ICD-10-CM

## 2022-02-23 PROCEDURE — 99213 OFFICE O/P EST LOW 20 MIN: CPT | Performed by: PHYSICIAN ASSISTANT

## 2022-02-23 ASSESSMENT — PAIN SCALES - GENERAL: PAINLEVEL: MODERATE PAIN (4)

## 2022-02-23 NOTE — PATIENT INSTRUCTIONS
Schedule with [facility].       They will be calling you to set up with Ortho for the carpal tunnel.   I also put in your mammogram order so they will call and you can schedule that for any time in the next few months.     We should plan to schedule a physical in a few months as well.   I'll check on what your follow-up with oncology should be.

## 2022-02-23 NOTE — PROGRESS NOTES
Assessment & Plan     Mass of perirectal soft tissue  At this time recommend seeing Colorectal to evaluate the mass.  Possible cyst, concern for fistula given proximity to rectal region and hx of hemorrhoids and probably chronic constipation.  Also concerned as she has a history of Ovarian cancer and has been lost to follow-up post surgical.  Referral was placed. Discussed symptoms in the meantime that would warrant re-evaluation.  Unfortunately today there is nothing visible or easily palpable to allow for attempt of I&D.  - Colorectal Surgery Referral; Future    Bilateral carpal tunnel syndrome  Referral placed to ortho for her Carpal tunnel.   - Orthopedic  Referral; Future    Visit for screening mammogram  Screening is due.   - MA SCREENING DIGITAL BILAT - Future  (s+30); Future    Did reach out to oncology to figure out if they still would like to follow-up with her for her previous ovarian cancer, waiting on recommendations.     Return in about 3 months (around 5/23/2022) for Physical Exam, Lab Work.    Options for treatment and follow-up care were reviewed with the patient and/or guardian. Patient and/or guardian engaged in the decision making process and verbalized understanding of the options discussed and agreed with the final plan.    Suellen Millard PA-C  [facility] BIENVENIDO Menard is a 51 year old who presents for the following health issues     History of Present Illness     Reason for visit:  Pain in my lower back and a lump  Symptom onset:  3-4 weeks ago    She eats 2-3 servings of fruits and vegetables daily.She consumes 2 sweetened beverage(s) daily.She exercises with enough effort to increase her heart rate 60 or more minutes per day.  She exercises with enough effort to increase her heart rate 7 days per week.   She is taking medications regularly.     Rectal /  Bump that is on right side buttock by rectal. Past 4 weeks it's been there, worsens with BM's.  "  - It can be tender to sit on that side. It feels like it can get larger and slightly smaller. Nothing ever drains from the area.  She is having her normal BMs which is every other day.  She does notice when she has a BM it does seem to make the lump hurt more but she isn't having otherwise changes in her consistency of her bowel movement.  She has occasional bleeding but that is normal for her with her hemorrhoids.  She has no urine symptoms but has noticed some blood at times with wiping.     She has never had this problem before.   Last week it was getting pretty severe but seemed to come down a little bit and is more tolerable this week.     She just got insurance and needs to get caught up in her health care.   She has carpal tunnel and was supposed to see a specialist.   She has neck issues the neurologist referred her to someone elsewhere.     Review of Systems   Constitutional, HEENT, cardiovascular, pulmonary, gi and gu systems are negative, except as otherwise noted.      Objective    /80   Pulse 99   Temp 96.9  F (36.1  C) (Temporal)   Resp 20   Ht 1.645 m (5' 4.76\")   Wt 110.7 kg (244 lb)   LMP 09/19/2011   SpO2 99%   BMI 40.90 kg/m    Body mass index is 40.9 kg/m .  Physical Exam   GENERAL: healthy, alert and no distress  RECTAL (female): There is normal sphincter tone with numerous hemorrhoidal skin tags as well as resolving external thrombosed hemorrhoids.  On the right side perirectal/gluteal region there is a firm, mobile, tender mass that is slightly deep but palpable at least the size of a grape, there is no overlying redness, no signs of drainage.  No signs of drainage from the rectum.    MS: no gross musculoskeletal defects noted, no edema  SKIN: no suspicious lesions or rashes  PSYCH: mentation appears normal, affect normal/bright        "

## 2022-02-24 DIAGNOSIS — D39.12 OVARIAN TUMOR OF BORDERLINE MALIGNANCY, LEFT: Primary | ICD-10-CM

## 2022-02-24 DIAGNOSIS — R97.1 ELEVATED CA-125: ICD-10-CM

## 2022-03-01 ENCOUNTER — HOSPITAL ENCOUNTER (OUTPATIENT)
Dept: MAMMOGRAPHY | Facility: CLINIC | Age: 52
Discharge: HOME | End: 2022-03-01
Attending: PHYSICIAN ASSISTANT | Admitting: PHYSICIAN ASSISTANT
Payer: COMMERCIAL

## 2022-03-01 DIAGNOSIS — Z12.31 VISIT FOR SCREENING MAMMOGRAM: ICD-10-CM

## 2022-03-01 PROCEDURE — 77067 SCR MAMMO BI INCL CAD: CPT

## 2022-03-02 ENCOUNTER — LAB (OUTPATIENT)
Dept: LAB | Facility: CLINIC | Age: 52
End: 2022-03-02
Payer: COMMERCIAL

## 2022-03-02 ENCOUNTER — ONCOLOGY VISIT (OUTPATIENT)
Dept: ONCOLOGY | Facility: CLINIC | Age: 52
End: 2022-03-02
Payer: COMMERCIAL

## 2022-03-02 VITALS
BODY MASS INDEX: 40.68 KG/M2 | HEART RATE: 90 BPM | OXYGEN SATURATION: 95 % | WEIGHT: 242.7 LBS | TEMPERATURE: 98.2 F | DIASTOLIC BLOOD PRESSURE: 80 MMHG | SYSTOLIC BLOOD PRESSURE: 119 MMHG

## 2022-03-02 DIAGNOSIS — D39.12 OVARIAN TUMOR OF BORDERLINE MALIGNANCY, LEFT: ICD-10-CM

## 2022-03-02 DIAGNOSIS — R97.1 ELEVATED CA-125: ICD-10-CM

## 2022-03-02 LAB
CANCER AG125 SERPL-ACNC: 12 U/ML (ref 0–30)
HOLD SPECIMEN: NORMAL
HOLD SPECIMEN: NORMAL

## 2022-03-02 PROCEDURE — 36415 COLL VENOUS BLD VENIPUNCTURE: CPT

## 2022-03-02 PROCEDURE — 99215 OFFICE O/P EST HI 40 MIN: CPT | Performed by: NURSE PRACTITIONER

## 2022-03-02 PROCEDURE — 86304 IMMUNOASSAY TUMOR CA 125: CPT

## 2022-03-02 ASSESSMENT — PAIN SCALES - GENERAL: PAINLEVEL: MILD PAIN (2)

## 2022-03-02 NOTE — NURSING NOTE
"Oncology Rooming Note    March 2, 2022 1:19 PM   Sailaja Mcgee is a 51 year old female who presents for:    Chief Complaint   Patient presents with     RECHECK     Initial Vitals: /80 (BP Location: Right arm, Patient Position: Sitting, Cuff Size: Adult Large)   Pulse 90   Temp 98.2  F (36.8  C) (Oral)   Wt 110.1 kg (242 lb 11.2 oz)   LMP 09/19/2011   SpO2 95%   BMI 40.68 kg/m   Estimated body mass index is 40.68 kg/m  as calculated from the following:    Height as of 2/23/22: 1.645 m (5' 4.76\").    Weight as of this encounter: 110.1 kg (242 lb 11.2 oz). Body surface area is 2.24 meters squared.  Mild Pain (2) Comment: Data Unavailable   Patient's last menstrual period was 09/19/2011.  Allergies reviewed: Yes  Medications reviewed: Yes    Medications: Medication refills not needed today.  Pharmacy name entered into [facility]: [facility] [address] [place] [id number] [facility]    Clinical concerns: Harleen-rectal mass follow up.      Navya Meehan RN              "

## 2022-03-02 NOTE — LETTER
3/2/2022         RE: Sailaja Mcgee  [address] [facility] [address]        Dear Colleague,    Thank you for referring your patient, Sailaja Mcgee, to the [facility]. Please see a copy of my visit note below.    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  She is s/p XL-BSO 7/15/19 which served as her treatment.  She is here today for a surveillance visit.     Oncology History:  Prior vaginal hysterectomy (ovaries in situ) for endometriosis and painful periods  19: MRI of back for back pain  19: CT scan showing large pelvic mass. Ca125 282, CEA 6.9,   7/15/19: XLap/BSO/Appy/Omx/Biopsies/CL. 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   2020:  12.    2020: CT CAP   IMPRESSION:  1. Status post hysterectomy and bilateral salpingo-oophorectomy.  Previously noted large pelvic mass has been removed and there is no  obvious evidence for recurrent malignancy.  2. Diffuse fatty infiltration of the liver.  3. Absent left kidney likely due to prior renal donation.  4. No evidence for metastasis is identified on this study.                  Today she comes to clinic with ongoing perirectal discomfort associated perirectal mass.  She is working with a provider in colon and rectal.  She first noted the mass about 6 weeks ago and was evaluated by her PCP who referred her to them.  She notes that she has increased discomfort and pain after she has a BM.  She will occasionally have blood after wiping.  She is using ibuprofen which is helpful.  She has a pelvic MRI on Friday.  She was also advised by her PCP to be seen in our clinic as she was overdue for follow up for  her hx of borderline tumor.  She notes increased bloating, decreased appetite, and early satiety over the last 6 months.  She denies any vaginal bleeding, no changes in her bladder habits, no nausea/emesis, no lower extremity edema, and no  fevers or chills, and no chest pain or shortness of breath.  She is overdue for her annual physical but is current with her colon cancer screening and her mammogram.                      Review of Systems:    Systemic           no weight changes; no fever; no chills; no night sweats; no appetite changes  Skin           no rashes, or lesions  Eye           no irritation; no changes in vision  Orlin-Laryngeal           no dysphagia; no hoarseness   Pulmonary    no cough; no shortness of breath  Cardiovascular    no chest pain; no palpitations  Gastrointestinal    no diarrhea; no constipation; no abdominal pain; no changes in bowel habits; no blood in stool  Genitourinary   no urinary frequency; no urinary urgency; no dysuria; no pain; no abnormal vaginal discharge; no abnormal vaginal bleeding  Breast   no breast discharge; no breast changes; no breast pain  Musculoskeletal    no myalgias; no arthralgias; no back pain  Psychiatric           no depressed mood; no anxiety    Hematologic   no tender lymph nodes; no noticeable swellings or lumps   Endocrine    no hot flashes; no heat/cold intolerance         Neurological   no tremor; no numbness and tingling; no headaches; no difficulty sleeping      Past Medical History:    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         Past Surgical History:    Past Surgical History:   Procedure Laterality Date     COLONOSCOPY N/A 7/10/2019    Procedure: COLONOSCOPY;  Surgeon: Sheba Tenorio MD;  [facility]     CYSTOSCOPY  10/24/2011    Procedure:CYSTOSCOPY; Surgeon:NAYELY MADRIGAL; Location:[facility] OR     HC BRAIN MAPPING, 1ST HOUR MD ATTENDANCE  2001    ablation of seizure focus     HC REDUCTION OF LARGE BREAST  2001     HYSTERECTOMY TOTAL ABD, [place] SALPINGO-OOPHORECTOMY, NODE DISSECTION, TUMOR DEBULKING, COMBINED Bilateral 7/15/2019    Procedure: Exploratory Laparotomy, Total Abdominal Hysterectomy, Removal Of Both Tubes And Ovaries, Omentectomy, Appendectomy, Peritoneal Biopsies;  Surgeon: Ivy Soto MD;  Location: [facility] OR     HYSTERECTOMY VAGINAL  10/24/2011    Procedure:HYSTERECTOMY VAGINAL; vaginal hysterectomy and cystoscopy; Surgeon:NAYELY MADRIGAL; Location:[facility] OR     HYSTEROSCOPY,ABLATION ENDOMETRIUM  2001     [facility] LAP,DONOR KIDNEY REMOV,LIVING  4/2/2009    Left laparoscopic donor nephrectomy.  Donating to brother. [facility].         Health Maintenance Due   Topic Date Due     MICROALBUMIN  Never done     ADVANCE CARE PLANNING  Never done     COVID-19 Vaccine (1) Never done     DTAP/TDAP/TD IMMUNIZATION (1 - Tdap) Never done     PREVENTIVE CARE VISIT  09/14/2012     HEMOGLOBIN  02/17/2021     BMP  02/21/2021     LIPID  02/21/2021     INFLUENZA VACCINE (1) Never done     ZOSTER IMMUNIZATION (1 of 2) 04/08/2021       Current Medications:     Current Outpatient Medications   Medication Sig Dispense Refill     omeprazole (PRILOSEC) 10 MG DR capsule Take 40 mg by mouth every morning        acetaminophen (TYLENOL) 325 MG tablet Take 2 tablets (650 mg) by mouth every 6 hours (Patient not taking: Reported on 2/21/2020) 12 tablet 0     ibuprofen (ADVIL/MOTRIN) 200 MG capsule Take 400 mg by mouth as needed for fever (Patient not taking: Reported on 3/2/2022)           Allergies:        Allergies   Allergen Reactions     Aspirin Rash     Rash on face        Social History:     Social History     Tobacco Use     Smoking status: Never Smoker     Smokeless tobacco: Never Used     Tobacco comment: no smokers in  the household   Substance Use Topics     Alcohol use: No       History   Drug Use No         Family History:     The patient's family history is notable for:    Family History   Problem Relation Age of Onset     Cancer Paternal Grandfather         colon cancer     Diabetes Maternal Grandmother      Cerebrovascular Disease Maternal Grandmother      Arthritis Maternal Grandmother      Arthritis Mother      Cardiovascular Maternal Grandfather         heart attack x2     Heart Disease Maternal Grandfather         heart attack x2     Gastrointestinal Disease Paternal Grandmother         liver failure     Genitourinary Problems Brother         kidney failure     Cancer Father         lung cancer diagnosed 7/11 due to chemical exposure war     Cancer Maternal Aunt      Asthma No family hx of      C.A.D. No family hx of      Hypertension No family hx of      Breast Cancer No family hx of      Cancer - colorectal No family hx of      Prostate Cancer No family hx of      Alzheimer Disease No family hx of      Blood Disease No family hx of      Circulatory No family hx of      Eye Disorder No family hx of      Lipids No family hx of      Musculoskeletal Disorder No family hx of      Neurologic Disorder No family hx of      Respiratory No family hx of      Thyroid Disease No family hx of          Physical Exam:     /80 (BP Location: Right arm, Patient Position: Sitting, Cuff Size: Adult Large)   Pulse 90   Temp 98.2  F (36.8  C) (Oral)   Wt 110.1 kg (242 lb 11.2 oz)   LMP 09/19/2011   SpO2 95%   BMI 40.68 kg/m    Body mass index is 40.68 kg/m .    General Appearance: healthy and alert, no distress     HEENT: no palpable nodules or masses        Cardiovascular: regular rate and rhythm, no gallops, rubs or murmurs     Respiratory: lungs clear, no rales, rhonchi or wheezes    Musculoskeletal: extremities non tender and without edema    Skin: no lesions or rashes     Neurological: normal gait, no gross  defects     Psychiatric: appropriate mood and affect                               Hematological: normal cervical, supraclavicular and inguinal lymph nodes     Gastrointestinal:       abdomen soft, non-tender, non-distended, no organomegaly or masses    Genitourinary: External genitalia and urethral meatus appears normal.  Vagina is smooth without nodularity or masses.  Cervix is surgically absent.  Bimanual exam reveals no masses or nodularity in the vagina.  Recto-vaginal exam reveals perirectal mass and fullness.      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  She is s/p XL-BSO 7/15/19 which served as her treatment.  She is here today for a surveillance visit.     40 minutes spent on the date of the encounter doing chart review, history and exam, documentation, and further activities as noted above.      Plan:     1.) Borderline ovarian:  Strongly recommend follow up with colorectal for her perirectal mass including pelvic MRI as already scheduled.  She does have a pending  and if this is a recurrence her  should be elevated and the pelvic MRI would be able to visualize this as well.  She is at low risk for recurrence as she is s/p a BSO.  Assuming all this is negative she can RTC in 6 months for next surveillance visit.  Plan for surveillance visits every 6 months until she is 5 years post treatment (7/2024).  If her  is WDL ok to no longer follow this per Dr. Soto's recommendation to only follow this for 3 years.  Reviewed signs and symptoms for when she should contact the clinic or seek additional care.  Patient to contact the clinic with any questions or concerns in the interim.        Genetic risk factors were assessed and she does not meet qualification for referral.      Labs and/or tests ordered include:  .     2.) Health maintenance:  Issues addressed today include following  up with PCP for annual health maintenance and non-gynecologic issues.       Yani Mares, JUAN, APRN, FNP-C  Nurse Practitioner  Division of Gynecologic Oncology  Pager: [phone]     CC  Patient Care Team:  Suellen Millard PA-C as PCP - General (Physician Assistant)  Suellen Millard PA-C as Assigned PCP  SELF, REFERRED        Again, thank you for allowing me to participate in the care of your patient.        Sincerely,        EREN Bethea CNP

## 2022-03-03 ENCOUNTER — OFFICE VISIT (OUTPATIENT)
Dept: ORTHOPEDICS | Facility: OTHER | Age: 52
End: 2022-03-03
Payer: COMMERCIAL

## 2022-03-03 VITALS — BODY MASS INDEX: 40.32 KG/M2 | HEIGHT: 65 IN | WEIGHT: 242 LBS

## 2022-03-03 DIAGNOSIS — G56.03 BILATERAL CARPAL TUNNEL SYNDROME: Primary | ICD-10-CM

## 2022-03-03 PROCEDURE — 99203 OFFICE O/P NEW LOW 30 MIN: CPT | Performed by: ORTHOPAEDIC SURGERY

## 2022-03-03 NOTE — LETTER
3/3/2022         RE: Sailaja Mcgee  [address] [facility] [address]        Dear Colleague,    Thank you for referring your patient, Sailaja Mcgee, to the [facility] [place]. Please see a copy of my visit note below.    Sailaja Mcgee is a 51 year old female who is seen in consultation at the request of Suellen Millard PA-C for complaint of numbness of bilateral hand, especially with use of the hand and at night. Left worse than right.  Pain none, but drops objects..    She has had symptoms for 2 years.    Small finger is involved.  Prior treatment used: Wrist splint - no.  NSAID: - no, allergic to aspirin.    Employment: unknown. This is not work related.      PAST MEDICAL HISTORY:  Diabetes mellitus negative, hypothyroid negative.    EMG has not been performed.      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       Past Surgical History:   Procedure Laterality Date     COLONOSCOPY N/A 7/10/2019    Procedure: COLONOSCOPY;  Surgeon: Sheba Tenorio MD;  [facility] OR     CYSTOSCOPY  10/24/2011    Procedure:CYSTOSCOPY; Surgeon:NAYELY MADRIGAL; Location: OR      BRAIN MAPPING, 1ST HOUR MD ATTENDANCE  2001    ablation of seizure focus     HC REDUCTION OF LARGE BREAST  2001     HYSTERECTOMY TOTAL ABD, [place] SALPINGO-OOPHORECTOMY, NODE DISSECTION, TUMOR DEBULKING, COMBINED Bilateral 7/15/2019    Procedure: Exploratory Laparotomy, Total Abdominal Hysterectomy, Removal Of Both Tubes And Ovaries, Omentectomy, Appendectomy, Peritoneal Biopsies;  Surgeon: Ivy Soto MD;  Location: [facility] OR     HYSTERECTOMY VAGINAL  10/24/2011    Procedure:HYSTERECTOMY VAGINAL; vaginal hysterectomy and cystoscopy; Surgeon:NAYELY MADRIGAL; Location: OR      HYSTEROSCOPY,ABLATION ENDOMETRIUM  2001     [facility] LAP,DONOR KIDNEY REMOV,LIVING  4/2/2009    Left laparoscopic donor nephrectomy.  Donating to brother. [facility].       Family History   Problem Relation Age of Onset     Cancer Paternal Grandfather         colon cancer     Diabetes Maternal Grandmother      Cerebrovascular Disease Maternal Grandmother      Arthritis Maternal Grandmother      Arthritis Mother      Cardiovascular Maternal Grandfather         heart attack x2     Heart Disease Maternal Grandfather         heart attack x2     Gastrointestinal Disease Paternal Grandmother         liver failure     Genitourinary Problems Brother         kidney failure     Cancer Father         lung cancer diagnosed 7/11 due to chemical exposure war     Cancer Maternal Aunt      Asthma No family hx of      C.A.D. No family hx of      Hypertension No family hx of      Breast Cancer No family hx of      Cancer - colorectal No family hx of      Prostate Cancer No family hx of      Alzheimer Disease No family hx of      Blood Disease No family hx of      Circulatory No family hx of      Eye Disorder No family hx of      Lipids No family hx of      Musculoskeletal Disorder No family hx of      Neurologic Disorder No family hx of      Respiratory No family hx of      Thyroid Disease No family hx of        Social History     Socioeconomic History     Marital status:      Spouse name: Not on file     Number of children: Not on file     Years of education: Not on file     Highest education level: Not on file   Occupational History     Not on file   Tobacco Use     Smoking status: Never Smoker     Smokeless tobacco: Never Used     Tobacco comment: no smokers in the household   Vaping Use     Vaping Use: Never used   Substance and Sexual Activity     Alcohol use: No     Drug use: No     Sexual activity: Yes     Partners: Male   Other Topics Concern     Parent/sibling w/ CABG, MI or angioplasty before 65F 55M? Not Asked   Social  "History Narrative     Not on file     Social Determinants of Health     Financial Resource Strain: Not on file   Food Insecurity: Not on file   Transportation Needs: Not on file   Physical Activity: Not on file   Stress: Not on file   Social Connections: Not on file   Intimate Partner Violence: Not on file   Housing Stability: Not on file       Current Outpatient Medications   Medication Sig Dispense Refill     acetaminophen (TYLENOL) 325 MG tablet Take 2 tablets (650 mg) by mouth every 6 hours (Patient not taking: Reported on 2/21/2020) 12 tablet 0     ibuprofen (ADVIL/MOTRIN) 200 MG capsule Take 400 mg by mouth as needed for fever (Patient not taking: Reported on 3/2/2022)       omeprazole (PRILOSEC) 10 MG DR capsule Take 40 mg by mouth every morning          Allergies   Allergen Reactions     Aspirin Rash     Rash on face       REVIEW OF SYSTEMS:  CONSTITUTIONAL:  NEGATIVE for fever, chills, change in weight, not feeling tired  SKIN:  NEGATIVE for worrisome rashes, no skin lumps, no skin ulcers and no non-healing wounds  EYES:  NEGATIVE for vision changes or irritation.  ENT/MOUTH:  NEGATIVE.  No hearing loss, no hoarseness, no difficulty swallowing.  RESP:  NEGATIVE. No cough or shortness of breath.  BREAST:  NEGATIVE for masses, tenderness or discharge  CV:  NEGATIVE for chest pain, palpitations or peripheral edema  GI:  NEGATIVE for nausea, abdominal pain, heartburn, or change in bowel habits  :  Negative. No dysuria, no hematuria  MUSCULOSKELETAL:  See HPI above  NEURO:  NEGATIVE . No headaches, no dizziness,  no numbness  ENDOCRINE:  NEGATIVE for temperature intolerance, skin/hair changes  HEME/ALLERGY/IMMUNE:  NEGATIVE for bleeding problems  PSYCHIATRIC:  NEGATIVE. no anxiety, no depression.          O: She appears well,   Vitals: Ht 1.645 m (5' 4.76\")   Wt 109.8 kg (242 lb)   LMP 09/19/2011   BMI 40.57 kg/m    BMI= Body mass index is 40.57 kg/m .   Cervical spine has pain with range of motion.  She " reports this pain sometimes goes into the arms.  Full range of motion of shoulder, elbows, wrists and fingers.  Hand exam revealed     Right: reduced sensation to all fingers, + Tinel's sign, + weakness of thumb/pinky pincer grasp, negative Phalen's maneuver, no thenar atrophy  Left: reduced sensation to all fingers, + Phalen's maneuver, + Tinel's sign, + weakness of thumb/pinky pincer grasp, no thenar atrophy   strength: Right normal, Left decreased.  Also positive Tinel at ulnar nerve at left elbow.      A: Bilateral carpal tunnel syndrome, left > right.  May also have left cubital tunnel syndrome and possible cervical origin.      P: Explanation of median nerve entrapment is given.  The treatment spectrum is discussed, including conservative treatment with night splint, NSAID, activity modification, and possible surgical release if the symptoms are persistent and severe.   Bilateral  wrist splint is provided for prn use, especially at night.  Left EMG ordered.  She did        Again, thank you for allowing me to participate in the care of your patient.        Sincerely,        Judah Barber MD

## 2022-03-03 NOTE — PROGRESS NOTES
Sailaja Mcgee is a 51 year old female who is seen in consultation at the request of Suellen Millard PA-C for complaint of numbness of bilateral hand, especially with use of the hand and at night. Left worse than right.  Pain none, but drops objects..    She has had symptoms for 2 years.    Small finger is involved.  Prior treatment used: Wrist splint - no.  NSAID: - no, allergic to aspirin.    Employment: unknown. This is not work related.      PAST MEDICAL HISTORY:  Diabetes mellitus negative, hypothyroid negative.    EMG has not been performed.      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       Past Surgical History:   Procedure Laterality Date     COLONOSCOPY N/A 7/10/2019    Procedure: COLONOSCOPY;  Surgeon: Sheba Tenorio MD;  [facility]     CYSTOSCOPY  10/24/2011    Procedure:CYSTOSCOPY; Surgeon:NAYELY MADRIGAL; Location:[facility]     HC BRAIN MAPPING, 1ST HOUR MD ATTENDANCE  2001    ablation of seizure focus     HC REDUCTION OF LARGE BREAST  2001     HYSTERECTOMY TOTAL ABD, [place] SALPINGO-OOPHORECTOMY, NODE DISSECTION, TUMOR DEBULKING, COMBINED Bilateral 7/15/2019    Procedure: Exploratory Laparotomy, Total Abdominal Hysterectomy, Removal Of Both Tubes And Ovaries, Omentectomy, Appendectomy, Peritoneal Biopsies;  Surgeon: Ivy Soto MD;  [facility]: [facility] OR     HYSTERECTOMY VAGINAL  10/24/2011    Procedure:HYSTERECTOMY VAGINAL; vaginal hysterectomy and cystoscopy; Surgeon:NAYELY MADRIGAL; Location[facility]     HYSTEROSCOPY,ABLATION ENDOMETRIUM  2001     [facility] LAP,DONOR KIDNEY REMOV,LIVING  4/2/2009    Left laparoscopic donor nephrectomy.  Donating to brother. [facility].       Family History   Problem Relation Age of Onset     Cancer Paternal Grandfather          colon cancer     Diabetes Maternal Grandmother      Cerebrovascular Disease Maternal Grandmother      Arthritis Maternal Grandmother      Arthritis Mother      Cardiovascular Maternal Grandfather         heart attack x2     Heart Disease Maternal Grandfather         heart attack x2     Gastrointestinal Disease Paternal Grandmother         liver failure     Genitourinary Problems Brother         kidney failure     Cancer Father         lung cancer diagnosed 7/11 due to chemical exposure war     Cancer Maternal Aunt      Asthma No family hx of      C.A.D. No family hx of      Hypertension No family hx of      Breast Cancer No family hx of      Cancer - colorectal No family hx of      Prostate Cancer No family hx of      Alzheimer Disease No family hx of      Blood Disease No family hx of      Circulatory No family hx of      Eye Disorder No family hx of      Lipids No family hx of      Musculoskeletal Disorder No family hx of      Neurologic Disorder No family hx of      Respiratory No family hx of      Thyroid Disease No family hx of        Social History     Socioeconomic History     Marital status:      Spouse name: Not on file     Number of children: Not on file     Years of education: Not on file     Highest education level: Not on file   Occupational History     Not on file   Tobacco Use     Smoking status: Never Smoker     Smokeless tobacco: Never Used     Tobacco comment: no smokers in the household   Vaping Use     Vaping Use: Never used   Substance and Sexual Activity     Alcohol use: No     Drug use: No     Sexual activity: Yes     Partners: Male   Other Topics Concern     Parent/sibling w/ CABG, MI or angioplasty before 65F 55M? Not Asked   Social History Narrative     Not on file     Social Determinants of Health     Financial Resource Strain: Not on file   Food Insecurity: Not on file   Transportation Needs: Not on file   Physical Activity: Not on file   Stress: Not on file   Social Connections:  "Not on file   Intimate Partner Violence: Not on file   Housing Stability: Not on file       Current Outpatient Medications   Medication Sig Dispense Refill     acetaminophen (TYLENOL) 325 MG tablet Take 2 tablets (650 mg) by mouth every 6 hours (Patient not taking: Reported on 2/21/2020) 12 tablet 0     ibuprofen (ADVIL/MOTRIN) 200 MG capsule Take 400 mg by mouth as needed for fever (Patient not taking: Reported on 3/2/2022)       omeprazole (PRILOSEC) 10 MG DR capsule Take 40 mg by mouth every morning          Allergies   Allergen Reactions     Aspirin Rash     Rash on face       REVIEW OF SYSTEMS:  CONSTITUTIONAL:  NEGATIVE for fever, chills, change in weight, not feeling tired  SKIN:  NEGATIVE for worrisome rashes, no skin lumps, no skin ulcers and no non-healing wounds  EYES:  NEGATIVE for vision changes or irritation.  ENT/MOUTH:  NEGATIVE.  No hearing loss, no hoarseness, no difficulty swallowing.  RESP:  NEGATIVE. No cough or shortness of breath.  BREAST:  NEGATIVE for masses, tenderness or discharge  CV:  NEGATIVE for chest pain, palpitations or peripheral edema  GI:  NEGATIVE for nausea, abdominal pain, heartburn, or change in bowel habits  :  Negative. No dysuria, no hematuria  MUSCULOSKELETAL:  See HPI above  NEURO:  NEGATIVE . No headaches, no dizziness,  no numbness  ENDOCRINE:  NEGATIVE for temperature intolerance, skin/hair changes  HEME/ALLERGY/IMMUNE:  NEGATIVE for bleeding problems  PSYCHIATRIC:  NEGATIVE. no anxiety, no depression.          O: She appears well,   Vitals: Ht 1.645 m (5' 4.76\")   Wt 109.8 kg (242 lb)   LMP 09/19/2011   BMI 40.57 kg/m    BMI= Body mass index is 40.57 kg/m .   Cervical spine has pain with range of motion.  She reports this pain sometimes goes into the arms.  Full range of motion of shoulder, elbows, wrists and fingers.  Hand exam revealed     Right: reduced sensation to all fingers, + Tinel's sign, + weakness of thumb/pinky pincer grasp, negative Phalen's " maneuver, no thenar atrophy  Left: reduced sensation to all fingers, + Phalen's maneuver, + Tinel's sign, + weakness of thumb/pinky pincer grasp, no thenar atrophy   strength: Right normal, Left decreased.  Also positive Tinel at ulnar nerve at left elbow.      A: Bilateral carpal tunnel syndrome, left > right.  May also have left cubital tunnel syndrome and possible cervical origin.      P: Explanation of median nerve entrapment is given.  The treatment spectrum is discussed, including conservative treatment with night splint, NSAID, activity modification, and possible surgical release if the symptoms are persistent and severe.   Bilateral  wrist splint is provided for prn use, especially at night.  Left EMG ordered.  She did

## 2022-03-03 NOTE — PATIENT INSTRUCTIONS
EMG of the Left extremity - [facility]. [place] will reach out to you to schedule in a few days. If you would like to schedule sooner, please call scheduling at the number listed below.     [address]  [place] [address]     [phone]

## 2022-03-04 ENCOUNTER — TRANSFERRED RECORDS (OUTPATIENT)
Dept: HEALTH INFORMATION MANAGEMENT | Facility: CLINIC | Age: 52
End: 2022-03-04
Payer: COMMERCIAL

## 2022-03-07 DIAGNOSIS — R31.9 HEMATURIA, UNSPECIFIED TYPE: ICD-10-CM

## 2022-03-07 DIAGNOSIS — R19.00 PELVIC MASS: ICD-10-CM

## 2022-03-07 DIAGNOSIS — D39.12 OVARIAN TUMOR OF BORDERLINE MALIGNANCY, LEFT: Primary | ICD-10-CM

## 2022-03-07 NOTE — PROGRESS NOTES
Page received today from PA at [facility] in regard to a cystic pelvic mass seen on pelvic MRI.  MRI was completed at [facility], images requested and currently in PACS.  Called patient who reports new intermittent difficulty emptying her bladder and occasional blood in her urine.  Plan to obtain a CT CAP, CBC, CMP, and UA/UC.  IR consult placed and a message sent to IR team to evaluate if a biopsy is possible.    Addendum: Per IR review a biopsy is technically possible but there is risk for seeding the tract.  Will hold on biopsy, complete the rest of work up as above, and arrange follow up with MD on the team for their input as Dr. Soto is out on leave.    Yani Mares, DNP, APRN, FNP-C  Nurse Practitioner   Division of Gynecologic Oncology  Pager: [phone]      none

## 2022-03-07 NOTE — PROGRESS NOTES
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       Reviewed with Dr. Lei  CT guided biopsy of left cystic pelvic mass see MR 3/4/22 se 4 image 22 (Pt to be prone) is feasible.  Pt is not on AC.      Inbasket message to Yani Mares APRN CNP highlighting concern for tract seeding if we are to proceed with biopsy.  She will get back to IR if she would like us to proceed with biopsy.            Miranda Wetzel PA-C  Interventional Radiology   IR on-call pager: [phone]

## 2022-03-11 ENCOUNTER — HOSPITAL ENCOUNTER (OUTPATIENT)
Dept: CT IMAGING | Facility: CLINIC | Age: 52
Discharge: HOME | End: 2022-03-11
Attending: NURSE PRACTITIONER | Admitting: NURSE PRACTITIONER
Payer: COMMERCIAL

## 2022-03-11 ENCOUNTER — LAB (OUTPATIENT)
Dept: LAB | Facility: CLINIC | Age: 52
End: 2022-03-11
Payer: COMMERCIAL

## 2022-03-11 DIAGNOSIS — R31.9 HEMATURIA, UNSPECIFIED TYPE: ICD-10-CM

## 2022-03-11 DIAGNOSIS — D39.12 OVARIAN TUMOR OF BORDERLINE MALIGNANCY, LEFT: ICD-10-CM

## 2022-03-11 DIAGNOSIS — Z11.59 ENCOUNTER FOR SCREENING FOR OTHER VIRAL DISEASES: Primary | ICD-10-CM

## 2022-03-11 LAB
ALBUMIN SERPL-MCNC: 3.2 G/DL (ref 3.4–5)
ALBUMIN UR-MCNC: NEGATIVE MG/DL
ALP SERPL-CCNC: 107 U/L (ref 40–150)
ALT SERPL W P-5'-P-CCNC: 23 U/L (ref 0–50)
ANION GAP SERPL CALCULATED.3IONS-SCNC: 3 MMOL/L (ref 3–14)
APPEARANCE UR: ABNORMAL
AST SERPL W P-5'-P-CCNC: 14 U/L (ref 0–45)
BASOPHILS # BLD AUTO: 0 10E3/UL (ref 0–0.2)
BASOPHILS NFR BLD AUTO: 1 %
BILIRUB SERPL-MCNC: 0.3 MG/DL (ref 0.2–1.3)
BILIRUB UR QL STRIP: NEGATIVE
BUN SERPL-MCNC: 20 MG/DL (ref 7–30)
CALCIUM SERPL-MCNC: 8.8 MG/DL (ref 8.5–10.1)
CHLORIDE BLD-SCNC: 108 MMOL/L (ref 94–109)
CO2 SERPL-SCNC: 28 MMOL/L (ref 20–32)
COLOR UR AUTO: YELLOW
CREAT SERPL-MCNC: 1.13 MG/DL (ref 0.52–1.04)
EOSINOPHIL # BLD AUTO: 0.1 10E3/UL (ref 0–0.7)
EOSINOPHIL NFR BLD AUTO: 2 %
ERYTHROCYTE [DISTWIDTH] IN BLOOD BY AUTOMATED COUNT: 13.9 % (ref 10–15)
GFR SERPL CREATININE-BSD FRML MDRD: 59 ML/MIN/1.73M2
GLUCOSE BLD-MCNC: 147 MG/DL (ref 70–99)
GLUCOSE UR STRIP-MCNC: NEGATIVE MG/DL
HCT VFR BLD AUTO: 41.3 % (ref 35–47)
HGB BLD-MCNC: 13.2 G/DL (ref 11.7–15.7)
HGB UR QL STRIP: NEGATIVE
IMM GRANULOCYTES # BLD: 0.1 10E3/UL
IMM GRANULOCYTES NFR BLD: 1 %
KETONES UR STRIP-MCNC: 5 MG/DL
LEUKOCYTE ESTERASE UR QL STRIP: ABNORMAL
LYMPHOCYTES # BLD AUTO: 2.1 10E3/UL (ref 0.8–5.3)
LYMPHOCYTES NFR BLD AUTO: 25 %
MCH RBC QN AUTO: 29.1 PG (ref 26.5–33)
MCHC RBC AUTO-ENTMCNC: 32 G/DL (ref 31.5–36.5)
MCV RBC AUTO: 91 FL (ref 78–100)
MONOCYTES # BLD AUTO: 0.5 10E3/UL (ref 0–1.3)
MONOCYTES NFR BLD AUTO: 6 %
MUCOUS THREADS #/AREA URNS LPF: PRESENT /LPF
NEUTROPHILS # BLD AUTO: 5.7 10E3/UL (ref 1.6–8.3)
NEUTROPHILS NFR BLD AUTO: 65 %
NITRATE UR QL: NEGATIVE
NRBC # BLD AUTO: 0 10E3/UL
NRBC BLD AUTO-RTO: 0 /100
PH UR STRIP: 5 [PH] (ref 5–7)
PLATELET # BLD AUTO: 364 10E3/UL (ref 150–450)
POTASSIUM BLD-SCNC: 3.9 MMOL/L (ref 3.4–5.3)
PROT SERPL-MCNC: 7.8 G/DL (ref 6.8–8.8)
RADIOLOGIST FLAGS: ABNORMAL
RBC # BLD AUTO: 4.54 10E6/UL (ref 3.8–5.2)
RBC URINE: 7 /HPF
SODIUM SERPL-SCNC: 139 MMOL/L (ref 133–144)
SP GR UR STRIP: 1.03 (ref 1–1.03)
SQUAMOUS EPITHELIAL: 28 /HPF
UROBILINOGEN UR STRIP-MCNC: NORMAL MG/DL
WBC # BLD AUTO: 8.5 10E3/UL (ref 4–11)
WBC URINE: 20 /HPF

## 2022-03-11 PROCEDURE — 85025 COMPLETE CBC W/AUTO DIFF WBC: CPT

## 2022-03-11 PROCEDURE — 80053 COMPREHEN METABOLIC PANEL: CPT

## 2022-03-11 PROCEDURE — 250N000011 HC RX IP 250 OP 636: Performed by: RADIOLOGY

## 2022-03-11 PROCEDURE — 36415 COLL VENOUS BLD VENIPUNCTURE: CPT

## 2022-03-11 PROCEDURE — 87086 URINE CULTURE/COLONY COUNT: CPT

## 2022-03-11 PROCEDURE — 250N000009 HC RX 250: Performed by: RADIOLOGY

## 2022-03-11 PROCEDURE — 74177 CT ABD & PELVIS W/CONTRAST: CPT

## 2022-03-11 PROCEDURE — 81001 URINALYSIS AUTO W/SCOPE: CPT

## 2022-03-11 RX ORDER — IOPAMIDOL 755 MG/ML
500 INJECTION, SOLUTION INTRAVASCULAR ONCE
Status: COMPLETED | OUTPATIENT
Start: 2022-03-11 | End: 2022-03-11

## 2022-03-11 RX ADMIN — SODIUM CHLORIDE 60 ML: 9 INJECTION, SOLUTION INTRAVENOUS at 13:24

## 2022-03-11 RX ADMIN — IOPAMIDOL 100 ML: 755 INJECTION, SOLUTION INTRAVENOUS at 13:23

## 2022-03-13 LAB — BACTERIA UR CULT: NO GROWTH

## 2022-03-14 ENCOUNTER — TELEPHONE (OUTPATIENT)
Dept: ONCOLOGY | Facility: CLINIC | Age: 52
End: 2022-03-14
Payer: COMMERCIAL

## 2022-03-14 NOTE — TELEPHONE ENCOUNTER
Called patient and discussed lab results which were stable and WDL.  UA was sent for culture which showed no UTI.  Briefly discussed that her CT showed the known mass that was seen on her previous MRI with no other concern for distant metastatic disease.  She will discuss this further with Dr. Diez at their appointment later this week.    Yani Mares, JUAN, APRN, FNP-C  Nurse Practitioner   Division of Gynecologic Oncology  Pager: [phone]

## 2022-03-16 ENCOUNTER — ONCOLOGY VISIT (OUTPATIENT)
Dept: ONCOLOGY | Facility: CLINIC | Age: 52
End: 2022-03-16
Attending: OBSTETRICS & GYNECOLOGY
Payer: COMMERCIAL

## 2022-03-16 VITALS
SYSTOLIC BLOOD PRESSURE: 128 MMHG | OXYGEN SATURATION: 98 % | WEIGHT: 242 LBS | TEMPERATURE: 98.3 F | DIASTOLIC BLOOD PRESSURE: 91 MMHG | BODY MASS INDEX: 40.57 KG/M2 | HEART RATE: 94 BPM

## 2022-03-16 DIAGNOSIS — D39.12 OVARIAN TUMOR OF BORDERLINE MALIGNANCY, LEFT: ICD-10-CM

## 2022-03-16 DIAGNOSIS — D39.12 OVARIAN TUMOR OF BORDERLINE MALIGNANCY, LEFT: Primary | ICD-10-CM

## 2022-03-16 DIAGNOSIS — R19.03 RIGHT LOWER QUADRANT ABDOMINAL MASS: Primary | ICD-10-CM

## 2022-03-16 PROCEDURE — 99215 OFFICE O/P EST HI 40 MIN: CPT | Performed by: OBSTETRICS & GYNECOLOGY

## 2022-03-16 PROCEDURE — G0463 HOSPITAL OUTPT CLINIC VISIT: HCPCS

## 2022-03-16 ASSESSMENT — PAIN SCALES - GENERAL: PAINLEVEL: NO PAIN (0)

## 2022-03-16 NOTE — LETTER
3/16/2022      RE: Sailaja Mcgee  [address] [facility] [address]                   Follow Up Notes on Referred Patient    Date: 3/16/2022       Dr. Melyssa Petersen MD  No address on file       RE: Sailaja Mcgee  : 1970  ROGER: 3/16/2022    Dear Dr. Melyssa Petersen:    Sailaja Mcgee is a 51 year old woman with a diagnosis of stage IC3 mucinous borderline tumor.   She is here today for ***.   The patient presents today for followup.  She had a mucinous IC3 borderline tumor in 2019.  Underwent exploratory laparotomy, bilalateral salpingo-oophorectomy.  Of note, had a prior vaginal hysterectomy.  Staging again was IC3.  No adjuvant treatment.  Last followup was 2 years ago and then has not had any followup due to insurance issues.  Has had a 2-month history of pelvic pain.  Workup revealed a mass in the cul-de-sac abutting on the bladder as well as on the rectum.  It is over 5 cm.  Looks suspicious for recurrence versus another malignant process.  Of note, CA-125 has not changed since the .  She does have with the pain some nausea.  No change in her bowel or urinary habits.  No vaginal bleeding; however, she did notice some vaginal discharge.  Weight has been stable.  No B symptoms.        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         Past Surgical History:    Past Surgical History:   Procedure Laterality Date     COLONOSCOPY N/A 7/10/2019    Procedure: COLONOSCOPY;  Surgeon: Sheba Tenorio MD;  [facility]     CYSTOSCOPY  10/24/2011    Procedure:CYSTOSCOPY; Surgeon:NAYELY MADRIGAL; Location:[facility] OR     HC BRAIN MAPPING, 1ST HOUR MD ATTENDANCE      ablation of seizure focus     HC REDUCTION OF LARGE BREAST       HYSTERECTOMY  TOTAL ABD, [place] SALPINGO-OOPHORECTOMY, NODE DISSECTION, TUMOR DEBULKING, COMBINED Bilateral 7/15/2019    Procedure: Exploratory Laparotomy, Total Abdominal Hysterectomy, Removal Of Both Tubes And Ovaries, Omentectomy, Appendectomy, Peritoneal Biopsies;  Surgeon: Ivy Soto MD;  [facility]     HYSTERECTOMY VAGINAL  10/24/2011    Procedure:HYSTERECTOMY VAGINAL; vaginal hysterectomy and cystoscopy; Surgeon:NAYELY MADRIGAL; [facility]     HYSTEROSCOPY,ABLATION ENDOMETRIUM  2001     ZZC LAP,DONOR KIDNEY REMOV,LIVING  4/2/2009    Left laparoscopic donor nephrectomy.  Donating to brother. [facility].         Health Maintenance Due   Topic Date Due     MICROALBUMIN  Never done     ADVANCE CARE PLANNING  Never done     COVID-19 Vaccine (1) Never done     DTAP/TDAP/TD IMMUNIZATION (1 - Tdap) Never done     PREVENTIVE CARE VISIT  09/14/2012     LIPID  02/21/2021     INFLUENZA VACCINE (1) Never done     ZOSTER IMMUNIZATION (1 of 2) 04/08/2021       Current Medications:     Current Outpatient Medications   Medication Sig Dispense Refill     ibuprofen (ADVIL/MOTRIN) 200 MG capsule Take 400 mg by mouth as needed for fever        omeprazole (PRILOSEC) 10 MG DR capsule Take 40 mg by mouth every morning        UNABLE TO FIND Patient doesn't know name of medication but uses a cream for buttocks       acetaminophen (TYLENOL) 325 MG tablet Take 2 tablets (650 mg) by mouth every 6 hours (Patient not taking: Reported on 2/21/2020) 12 tablet 0         Allergies:        Allergies   Allergen Reactions     Aspirin Rash     Rash on face        Social History:     Social History     Tobacco Use     Smoking status: Never Smoker     Smokeless tobacco: Never Used     Tobacco comment: no smokers in the household   Substance Use Topics     Alcohol use: No       History   Drug Use No         Family History:       Family History   Problem Relation Age of Onset     Cancer Paternal Grandfather         colon cancer     Diabetes Maternal  Grandmother      Cerebrovascular Disease Maternal Grandmother      Arthritis Maternal Grandmother      Arthritis Mother      Cardiovascular Maternal Grandfather         heart attack x2     Heart Disease Maternal Grandfather         heart attack x2     Gastrointestinal Disease Paternal Grandmother         liver failure     Genitourinary Problems Brother         kidney failure     Cancer Father         lung cancer diagnosed 7/11 due to chemical exposure war     Cancer Maternal Aunt      Asthma No family hx of      C.A.D. No family hx of      Hypertension No family hx of      Breast Cancer No family hx of      Cancer - colorectal No family hx of      Prostate Cancer No family hx of      Alzheimer Disease No family hx of      Blood Disease No family hx of      Circulatory No family hx of      Eye Disorder No family hx of      Lipids No family hx of      Musculoskeletal Disorder No family hx of      Neurologic Disorder No family hx of      Respiratory No family hx of      Thyroid Disease No family hx of          Physical Exam:     BP (!) 128/91   Pulse 94   Temp 98.3  F (36.8  C) (Oral)   Wt 109.8 kg (242 lb)   LMP 09/19/2011   SpO2 98%   BMI 40.57 kg/m    Body mass index is 40.57 kg/m .    General Appearance: healthy and alert, no distress     Musculoskeletal: extremities non tender and without edema    Skin: no lesions or rashes     Neurological: normal gait, no gross defects     Psychiatric: appropriate mood and affect                               Hematological: normal cervical, supraclavicular and inguinal lymph nodes     ABDOMEN:  Soft, nontender, nondistended, obese.  Well-healed midline incision.  PELVIC:  External genitalia:  The patient has a 2 cm right rectal/anal mass that is known and likely either fissure, possibly even small fistula, based on MRI, without connection, but small abscess.  Normal external genitalia, normal vaginal mucosa.  Well-healed vaginal cuff.  Bimanual exam: Given the patient's  body habitus, cannot clearly appreciate the pelvic mass that she has, as well as on vaginal cuff. Rectovaginal confirms this.          Assessment:    Sailaja Mcgee is a 51 year old woman with a diagnosis of stage IC3 mucinous borderline tumor.     A total of 40 minutes was spent with the patient, 30 minutes of which were spent in counseling the patient and/or treatment planning.    1.  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  2.  5.3 cm complex pelvic mass at the vaginal cuff abutting the bladder.  3.  Rectal possible small fissure.  4.  CA-125 12.    Given the radiologic findings and normal CA-125, we will obtain a CT-guided biopsy of the skin and then proceed accordingly.  Of note she has palpable rectal lesion thought to be a fissure, further supporting the biopsy to rule out a GI primary.  I discussed that if this is recurrent borderline tumor or even possible low-grade ovarian cancer, this will need to be addressed with repeat surgery and surgical resection and possible cytoreduction at that time. We did discuss the mainstay of treatment for recurrent borderline tumor with surgical cytoreduction to no macroscopic visible disease (R0) and posible hormonal manipulation afterwards. We will follow up with her after the biopsy for definitive plan.  The patient agrees with that plan.  We will talk to her after we have the biopsy results.  All questions were answered.        Grady Diez MD, MS    Department of Obstetrics and Gynecology   Division of Gynecologic Oncology   [facility]  Phone: [phone]        CC  Patient Care Team:  Suellen Millard PA-C as PCP - General (Physician Assistant)  Suellen Millard PA-C as Assigned PCP  Judah Dozier MD as MD (Neurology)  SELF, REFERRED      Grady Diez MD

## 2022-03-16 NOTE — NURSING NOTE
"Oncology Rooming Note    March 16, 2022 12:15 PM   Sailaja Mcgee is a 51 year old female who presents for:    Chief Complaint   Patient presents with     Oncology Clinic Visit     rtn for pelvic mass     Initial Vitals: BP (!) 128/91   Pulse 94   Temp 98.3  F (36.8  C) (Oral)   Wt 109.8 kg (242 lb)   LMP 09/19/2011   SpO2 98%   BMI 40.57 kg/m   Estimated body mass index is 40.57 kg/m  as calculated from the following:    Height as of 3/3/22: 1.645 m (5' 4.76\").    Weight as of this encounter: 109.8 kg (242 lb). Body surface area is 2.24 meters squared.  No Pain (0) Comment: Data Unavailable   Patient's last menstrual period was 09/19/2011.  Allergies reviewed: Yes  Medications reviewed: Yes    Medications: Medication refills not needed today.  Pharmacy name entered into Reviews[employer]: [facility] [address] [place] [id number] [facility]    Clinical concerns: none       Loraine Sanchez, EMT            "

## 2022-03-16 NOTE — PROGRESS NOTES
Follow Up Notes on Referred Patient    Date: 3/16/2022       Dr. Melyssa Petersen MD  No address on file       RE: Sailaja Mcgee  : 1970  ROGER: 3/16/2022    Dear Dr. Melyssa Petersen:    Sailaja Mcgee is a 51 year old woman with a diagnosis of stage IC3 mucinous borderline tumor.   The patient presents today for followup.  She had a mucinous IC3 borderline tumor in 2019.  Underwent exploratory laparotomy, bilalateral salpingo-oophorectomy.  Of note, had a prior vaginal hysterectomy.  Staging again was IC3.  No adjuvant treatment.  Last followup was 2 years ago and then has not had any followup due to insurance issues.  Has had a 2-month history of pelvic pain.  Workup revealed a mass in the cul-de-sac abutting on the bladder as well as on the rectum.  It is over 5 cm.  Looks suspicious for recurrence versus another malignant process.  Of note, CA-125 has not changed since the .  She does have with the pain some nausea.  No change in her bowel or urinary habits.  No vaginal bleeding; however, she did notice some vaginal discharge.  Weight has been stable.  No B symptoms.        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         Past Surgical History:    Past Surgical History:   Procedure Laterality Date     COLONOSCOPY N/A 7/10/2019    Procedure: COLONOSCOPY;  Surgeon: Sheba Tenorio MD;  [facility]     CYSTOSCOPY  10/24/2011    Procedure:CYSTOSCOPY; Surgeon:NAYELY MADRIGAL; Location:[facility] OR     HC BRAIN MAPPING, 1ST HOUR MD ATTENDANCE      ablation of seizure focus     HC REDUCTION OF LARGE BREAST       HYSTERECTOMY TOTAL ABD, [place] SALPINGO-OOPHORECTOMY, NODE DISSECTION, TUMOR DEBULKING, COMBINED Bilateral 7/15/2019     Procedure: Exploratory Laparotomy, Total Abdominal Hysterectomy, Removal Of Both Tubes And Ovaries, Omentectomy, Appendectomy, Peritoneal Biopsies;  Surgeon: Ivy Soto MD;  Location: [facility] OR     HYSTERECTOMY VAGINAL  10/24/2011    Procedure:HYSTERECTOMY VAGINAL; vaginal hysterectomy and cystoscopy; Surgeon:NAYELY MADRIGAL; [facility]     HYSTEROSCOPY,ABLATION ENDOMETRIUM  2001     ZZC LAP,DONOR KIDNEY REMOV,LIVING  4/2/2009    Left laparoscopic donor nephrectomy.  Donating to brother. [facility] [place].         Health Maintenance Due   Topic Date Due     MICROALBUMIN  Never done     ADVANCE CARE PLANNING  Never done     COVID-19 Vaccine (1) Never done     DTAP/TDAP/TD IMMUNIZATION (1 - Tdap) Never done     PREVENTIVE CARE VISIT  09/14/2012     LIPID  02/21/2021     INFLUENZA VACCINE (1) Never done     ZOSTER IMMUNIZATION (1 of 2) 04/08/2021       Current Medications:     Current Outpatient Medications   Medication Sig Dispense Refill     ibuprofen (ADVIL/MOTRIN) 200 MG capsule Take 400 mg by mouth as needed for fever        omeprazole (PRILOSEC) 10 MG DR capsule Take 40 mg by mouth every morning        UNABLE TO FIND Patient doesn't know name of medication but uses a cream for buttocks       acetaminophen (TYLENOL) 325 MG tablet Take 2 tablets (650 mg) by mouth every 6 hours (Patient not taking: Reported on 2/21/2020) 12 tablet 0         Allergies:        Allergies   Allergen Reactions     Aspirin Rash     Rash on face        Social History:     Social History     Tobacco Use     Smoking status: Never Smoker     Smokeless tobacco: Never Used     Tobacco comment: no smokers in the household   Substance Use Topics     Alcohol use: No       History   Drug Use No         Family History:       Family History   Problem Relation Age of Onset     Cancer Paternal Grandfather         colon cancer     Diabetes Maternal Grandmother      Cerebrovascular Disease Maternal Grandmother      Arthritis Maternal Grandmother       Arthritis Mother      Cardiovascular Maternal Grandfather         heart attack x2     Heart Disease Maternal Grandfather         heart attack x2     Gastrointestinal Disease Paternal Grandmother         liver failure     Genitourinary Problems Brother         kidney failure     Cancer Father         lung cancer diagnosed 7/11 due to chemical exposure war     Cancer Maternal Aunt      Asthma No family hx of      C.A.D. No family hx of      Hypertension No family hx of      Breast Cancer No family hx of      Cancer - colorectal No family hx of      Prostate Cancer No family hx of      Alzheimer Disease No family hx of      Blood Disease No family hx of      Circulatory No family hx of      Eye Disorder No family hx of      Lipids No family hx of      Musculoskeletal Disorder No family hx of      Neurologic Disorder No family hx of      Respiratory No family hx of      Thyroid Disease No family hx of          Physical Exam:     BP (!) 128/91   Pulse 94   Temp 98.3  F (36.8  C) (Oral)   Wt 109.8 kg (242 lb)   LMP 09/19/2011   SpO2 98%   BMI 40.57 kg/m    Body mass index is 40.57 kg/m .    General Appearance: healthy and alert, no distress     Musculoskeletal: extremities non tender and without edema    Skin: no lesions or rashes     Neurological: normal gait, no gross defects     Psychiatric: appropriate mood and affect                               Hematological: normal cervical, supraclavicular and inguinal lymph nodes     ABDOMEN:  Soft, nontender, nondistended, obese.  Well-healed midline incision.  PELVIC:  External genitalia:  The patient has a 2 cm right rectal/anal mass that is known and likely either fissure, possibly even small fistula, based on MRI, without connection, but small abscess.  Normal external genitalia, normal vaginal mucosa.  Well-healed vaginal cuff.  Bimanual exam: Given the patient's body habitus, cannot clearly appreciate the pelvic mass that she has, as well as on vaginal cuff.  Rectovaginal confirms this.          Assessment:    Sailaja Mcgee is a 51 year old woman with a diagnosis of stage IC3 mucinous borderline tumor.     A total of 40 minutes was spent with the patient, 30 minutes of which were spent in counseling the patient and/or treatment planning.    1.  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  2.  5.3 cm complex pelvic mass at the vaginal cuff abutting the bladder.  3.  Rectal possible small fissure.  4.  CA-125 12.    Given the radiologic findings and normal CA-125, we will obtain a CT-guided biopsy of the skin and then proceed accordingly.  Of note she has palpable rectal lesion thought to be a fissure, further supporting the biopsy to rule out a GI primary.  I discussed that if this is recurrent borderline tumor or even possible low-grade ovarian cancer, this will need to be addressed with repeat surgery and surgical resection and possible cytoreduction at that time. We did discuss the mainstay of treatment for recurrent borderline tumor with surgical cytoreduction to no macroscopic visible disease (R0) and posible hormonal manipulation afterwards. We will follow up with her after the biopsy for definitive plan.  The patient agrees with that plan.  We will talk to her after we have the biopsy results.  All questions were answered.        Grady Diez MD, MS    Department of Obstetrics and Gynecology   Division of Gynecologic Oncology   [facility]  Phone: [phone]        CC  Patient Care Team:  Suellen Millard PA-C as PCP - General (Physician Assistant)  Suellen Millard PA-C as Assigned PCP  Judah Dozier MD as MD (Neurology)  SELF, REFERRED

## 2022-03-16 NOTE — Clinical Note
3/16/2022         RE: Sailaja Mcgee  [address] [place] [id number]        Dear Colleague,    Thank you for referring your patient, Sailaja Mcgee, to the [facility]. Please see a copy of my visit note below.    No notes on file    Again, thank you for allowing me to participate in the care of your patient.        Sincerely,        Grady Diez MD

## 2022-03-16 NOTE — PROGRESS NOTES
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  MRI 3/4/22 noted a cystic pelvic mass.        Reviewed with Dr. Lei  CT guided biopsy of left cystic pelvic mass seen on MRI 3/4/22 Series 4 image 22 (Pt to be prone) if feasible.  Pt is not on AC.       Inbasket message to Yani Mares APRN CNP highlighting concern for tract seeding if we are to proceed with biopsy.  She will get back to IR if she would like us to proceed with biopsy.      3/16/22    Dr Diez would like to move forward with the biopsy. He is not concerned about the seeding as it is more important to know what this is. It also was already metastatic problem as she had positive washings at the time of surgery            CT 3/11/22         PELVIC ORGANS: Hysterectomy. Complex cystic and mixed cystic and solid  mass low hemipelvis, slightly to the left of midline, which measures  up to 5.4 cm AP x 3.5 cm transverse x 3.6 cm SI. This approximates the  posterior wall of the urinary bladder; it is difficult to determine  whether it invades the urinary bladder. It is highly suspect for  recurrence of ovarian neoplasm. Stable pattern of subcentimeter  retroperitoneal and extraperitoneal lymph nodes       Procedure order, surgical pathology and leukemia lymphoma orders placed.    If requesting team would like samples sent for anything else please enter them or notify IR prior to scheduled procedure.    Primary team Yani JASON  made aware of IR recommendations via epic messaging.    EREN Marshall CNP  Interventional Radiology   IR on-call pager: [phone]

## 2022-03-25 ENCOUNTER — TELEPHONE (OUTPATIENT)
Dept: INTERVENTIONAL RADIOLOGY/VASCULAR | Facility: CLINIC | Age: 52
End: 2022-03-25
Payer: COMMERCIAL

## 2022-03-28 ENCOUNTER — LAB (OUTPATIENT)
Dept: LAB | Facility: OTHER | Age: 52
End: 2022-03-28
Attending: COLON & RECTAL SURGERY
Payer: COMMERCIAL

## 2022-03-28 DIAGNOSIS — Z11.59 ENCOUNTER FOR SCREENING FOR OTHER VIRAL DISEASES: ICD-10-CM

## 2022-03-28 PROCEDURE — U0005 INFEC AGEN DETEC AMPLI PROBE: HCPCS

## 2022-03-28 PROCEDURE — U0003 INFECTIOUS AGENT DETECTION BY NUCLEIC ACID (DNA OR RNA); SEVERE ACUTE RESPIRATORY SYNDROME CORONAVIRUS 2 (SARS-COV-2) (CORONAVIRUS DISEASE [COVID-19]), AMPLIFIED PROBE TECHNIQUE, MAKING USE OF HIGH THROUGHPUT TECHNOLOGIES AS DESCRIBED BY CMS-2020-01-R: HCPCS

## 2022-03-29 LAB — SARS-COV-2 RNA RESP QL NAA+PROBE: NEGATIVE

## 2022-03-31 ENCOUNTER — HOSPITAL ENCOUNTER (OUTPATIENT)
Facility: CLINIC | Age: 52
Discharge: HOME | End: 2022-03-31
Attending: OBSTETRICS & GYNECOLOGY | Admitting: RADIOLOGY
Payer: COMMERCIAL

## 2022-03-31 ENCOUNTER — APPOINTMENT (OUTPATIENT)
Dept: INTERVENTIONAL RADIOLOGY/VASCULAR | Facility: CLINIC | Age: 52
End: 2022-03-31
Attending: NURSE PRACTITIONER
Payer: COMMERCIAL

## 2022-03-31 ENCOUNTER — APPOINTMENT (OUTPATIENT)
Dept: MEDSURG UNIT | Facility: CLINIC | Age: 52
End: 2022-03-31
Attending: OBSTETRICS & GYNECOLOGY
Payer: COMMERCIAL

## 2022-03-31 VITALS
SYSTOLIC BLOOD PRESSURE: 118 MMHG | HEART RATE: 80 BPM | RESPIRATION RATE: 18 BRPM | TEMPERATURE: 98.1 F | DIASTOLIC BLOOD PRESSURE: 82 MMHG | OXYGEN SATURATION: 97 %

## 2022-03-31 DIAGNOSIS — D39.12 OVARIAN TUMOR OF BORDERLINE MALIGNANCY, LEFT: ICD-10-CM

## 2022-03-31 DIAGNOSIS — R19.03 RIGHT LOWER QUADRANT ABDOMINAL MASS: ICD-10-CM

## 2022-03-31 LAB — INR PPP: 1 (ref 0.85–1.15)

## 2022-03-31 PROCEDURE — 999N000132 HC STATISTIC PP CARE STAGE 1

## 2022-03-31 PROCEDURE — 36415 COLL VENOUS BLD VENIPUNCTURE: CPT | Performed by: NURSE PRACTITIONER

## 2022-03-31 PROCEDURE — 85610 PROTHROMBIN TIME: CPT | Performed by: NURSE PRACTITIONER

## 2022-03-31 PROCEDURE — 77012 CT SCAN FOR NEEDLE BIOPSY: CPT

## 2022-03-31 PROCEDURE — 88185 FLOWCYTOMETRY/TC ADD-ON: CPT | Performed by: NURSE PRACTITIONER

## 2022-03-31 PROCEDURE — 88342 IMHCHEM/IMCYTCHM 1ST ANTB: CPT | Mod: 26 | Performed by: PATHOLOGY

## 2022-03-31 PROCEDURE — 250N000009 HC RX 250: Performed by: PHYSICIAN ASSISTANT

## 2022-03-31 PROCEDURE — 88112 CYTOPATH CELL ENHANCE TECH: CPT | Mod: 26 | Performed by: PATHOLOGY

## 2022-03-31 PROCEDURE — 88305 TISSUE EXAM BY PATHOLOGIST: CPT | Mod: 26 | Performed by: PATHOLOGY

## 2022-03-31 PROCEDURE — 49180 BIOPSY ABDOMINAL MASS: CPT | Performed by: RADIOLOGY

## 2022-03-31 PROCEDURE — 250N000011 HC RX IP 250 OP 636: Performed by: PHYSICIAN ASSISTANT

## 2022-03-31 PROCEDURE — 88333 PATH CONSLTJ SURG CYTO XM 1: CPT | Mod: 26 | Performed by: PATHOLOGY

## 2022-03-31 PROCEDURE — 272N000653 HC COIL/EMBOLIC DEVICE CR8

## 2022-03-31 PROCEDURE — 99153 MOD SED SAME PHYS/QHP EA: CPT

## 2022-03-31 PROCEDURE — 88305 TISSUE EXAM BY PATHOLOGIST: CPT | Mod: TC | Performed by: NURSE PRACTITIONER

## 2022-03-31 PROCEDURE — 272N000505 HC NEEDLE CR5

## 2022-03-31 PROCEDURE — 99152 MOD SED SAME PHYS/QHP 5/>YRS: CPT

## 2022-03-31 PROCEDURE — 88188 FLOWCYTOMETRY/READ 9-15: CPT | Mod: GC | Performed by: STUDENT IN AN ORGANIZED HEALTH CARE EDUCATION/TRAINING PROGRAM

## 2022-03-31 PROCEDURE — 999N000142 HC STATISTIC PROCEDURE PREP ONLY

## 2022-03-31 PROCEDURE — 258N000003 HC RX IP 258 OP 636: Performed by: NURSE PRACTITIONER

## 2022-03-31 PROCEDURE — 88333 PATH CONSLTJ SURG CYTO XM 1: CPT | Mod: TC | Performed by: NURSE PRACTITIONER

## 2022-03-31 PROCEDURE — 77012 CT SCAN FOR NEEDLE BIOPSY: CPT | Mod: 26 | Performed by: RADIOLOGY

## 2022-03-31 PROCEDURE — 49180 BIOPSY ABDOMINAL MASS: CPT

## 2022-03-31 PROCEDURE — 99152 MOD SED SAME PHYS/QHP 5/>YRS: CPT | Performed by: RADIOLOGY

## 2022-03-31 RX ORDER — FENTANYL CITRATE 50 UG/ML
25-50 INJECTION, SOLUTION INTRAMUSCULAR; INTRAVENOUS EVERY 5 MIN PRN
Status: DISCONTINUED | OUTPATIENT
Start: 2022-03-31 | End: 2022-03-31 | Stop reason: HOSPADM

## 2022-03-31 RX ORDER — NALOXONE HYDROCHLORIDE 0.4 MG/ML
0.4 INJECTION, SOLUTION INTRAMUSCULAR; INTRAVENOUS; SUBCUTANEOUS
Status: DISCONTINUED | OUTPATIENT
Start: 2022-03-31 | End: 2022-03-31 | Stop reason: HOSPADM

## 2022-03-31 RX ORDER — LIDOCAINE 40 MG/G
CREAM TOPICAL
Status: DISCONTINUED | OUTPATIENT
Start: 2022-03-31 | End: 2022-03-31 | Stop reason: HOSPADM

## 2022-03-31 RX ORDER — SODIUM CHLORIDE 9 MG/ML
INJECTION, SOLUTION INTRAVENOUS CONTINUOUS
Status: DISCONTINUED | OUTPATIENT
Start: 2022-03-31 | End: 2022-03-31 | Stop reason: HOSPADM

## 2022-03-31 RX ORDER — PEDIATRIC MULTIVIT 61/D3/VIT K 1500-800
1 CAPSULE ORAL DAILY
COMMUNITY
End: 2022-12-14

## 2022-03-31 RX ORDER — NALOXONE HYDROCHLORIDE 0.4 MG/ML
0.2 INJECTION, SOLUTION INTRAMUSCULAR; INTRAVENOUS; SUBCUTANEOUS
Status: DISCONTINUED | OUTPATIENT
Start: 2022-03-31 | End: 2022-03-31 | Stop reason: HOSPADM

## 2022-03-31 RX ADMIN — LIDOCAINE HYDROCHLORIDE 20 ML: 10 INJECTION, SOLUTION EPIDURAL; INFILTRATION; INTRACAUDAL; PERINEURAL at 13:46

## 2022-03-31 RX ADMIN — FENTANYL CITRATE 50 MCG: 50 INJECTION INTRAMUSCULAR; INTRAVENOUS at 13:21

## 2022-03-31 RX ADMIN — FENTANYL CITRATE 50 MCG: 50 INJECTION INTRAMUSCULAR; INTRAVENOUS at 13:29

## 2022-03-31 RX ADMIN — FENTANYL CITRATE 50 MCG: 50 INJECTION INTRAMUSCULAR; INTRAVENOUS at 13:45

## 2022-03-31 RX ADMIN — SODIUM CHLORIDE: 9 INJECTION, SOLUTION INTRAVENOUS at 12:35

## 2022-03-31 NOTE — DISCHARGE INSTRUCTIONS
[facility]    Interventional Radiology  Patient Instructions Following Biopsy    AFTER YOU GO HOME  ? If you were given sedation DO NOT drive or operate machinery at home or at work for at least 24 hours  ? DO relax and take it easy for 48 hours, no strenuous activity for 24 hours  ? DO drink plenty of fluids  ? DO resume your regular diet, unless otherwise instructed by your Primary Physician  ? Keep the dressing dry and in place for 24 hours.  ? DO NOT SMOKE FOR AT LEAST 24 HOURS, if you have been given any medications that were to help you relax or sedate you during your procedure  ? DO NOT drink alcoholic beverages the day of your procedure  ? DO NOT do any strenuous exercise or lifting (> 10 lbs) for at least 7 days following your procedure  ? DO NOT take a bath or shower for at least 12 hours following your procedure  ? Remove dressing after shower the next day. Replace with Band aid for 2 days.  Never leave a wet dressing in place.  ? DO NOT make any important or legal decisions for 24 hours following your procedure  ? There should be minimum drainage from the biopsy site    CALL THE PHYSICIAN IF:  ? You start bleeding from the procedure site.  If you do start to bleed from that site, lie down flat and hold pressure on the site for a minimum of 10 minutes.  Your physician will tell you if you need to return to the hospital  ? You develop nausea or vomiting  ? You have excessive swelling, redness, or tenderness at the site  ? You have drainage that looks like it is infected.  ? You experience severe pain  ? You develop hives or a rash or unexplained itching  ? You develop shortness of breath  ? You develop a temperature of 101 degrees F or greater  ? You develop bloody clots or red urine after you are discharged  ? You develop chest pain or cough up blood, lightheadedness or fainting    [facility] INTERVENTIONAL RADIOLOGY DEPARTMENT  Procedure Physician: Dr Clemens   Date of procedure: March 31,  2022  Telephone Numbers: [phone] Monday-Friday 8:00 am to 4:30 pm  [phone] After 4:30 pm Monday-Friday, Weekends & Holidays.   Ask for the Interventional Radiologist on call.  Someone is on call 24 hrs/day  [facility] toll free number: [phone] Monday-Friday 8:00 am to 4:30 pm  [facility] Emergency Dept: [phone]

## 2022-03-31 NOTE — PROGRESS NOTES
Patient Name: Sailaja Mcgee  Medical Record Number: [id number]  Today's Date: 3/31/2022    Procedure: CT guided retroperitoneal pelvic mass biopsy  Proceduralist: Dr Sarath KARIMI  Pathology present: Yes    Procedure Start: 1327  Procedure end: 1357  Sedation medications administered:  Fentanyl 150 mcg       Report given to: Kathe MULTANI RN  : N/A    Other Notes: Pt arrived to IR room CT2 from . Consent reviewed. Pt denies any questions or concerns regarding procedure. Pt positioned prone and monitored per protocol. 15 ml of fluid aspirated and sent for cytology.  2 cores with 5 passess obtained from 2 sites and sent with surgical pathology.  Pt tolerated procedure without any noted complications.  Dressing placed on both biopsy needle sites.   Pt transferred back to .

## 2022-03-31 NOTE — PROGRESS NOTES
Pt prepped for Left abd cystic biopsy.PIV placed and NS infusing.Consent signed and questions answered.

## 2022-03-31 NOTE — PROCEDURES
[facility]    Procedure: IR Procedure Note    Date/Time: 3/31/2022 1:59 PM  Performed by: Emiliano Clemens MD  Authorized by: Emiliano Clemens MD       UNIVERSAL PROTOCOL   Site Marked: NA  Prior Images Obtained and Reviewed:  Yes  Required items: Required blood products, implants, devices and special equipment available    Patient identity confirmed:  Verbally with patient, arm band, provided demographic data and hospital-assigned identification number  Patient was reevaluated immediately before administering moderate or deep sedation or anesthesia  Confirmation Checklist:  Patient's identity using two indicators, relevant allergies, procedure was appropriate and matched the consent or emergent situation and correct equipment/implants were available  Time out: Immediately prior to the procedure a time out was called    Universal Protocol: the Joint Commission Universal Protocol was followed    Preparation: Patient was prepped and draped in usual sterile fashion    ESBL (mL):  5     ANESTHESIA    Anesthesia: Local infiltration  Local Anesthetic:  Lidocaine 1% without epinephrine  Anesthetic Total (mL):  15      SEDATION  Patient Sedated: Yes    Sedation Type:  Moderate (conscious) sedation  Sedation:  Fentanyl  Vital signs: Vital signs monitored during sedation    See dictated procedure note for full details.  Findings: 17 gauge needle placed into more posterior nodule under CT guidance. Miriam fluid returned. Three passes made and only scant tissue returned.    Needle removed.    New 17 gauge needle placed into the lesion under CT guidance. Bloody fluid returns. Three passes made and scant tissue returned. Tissue submitted in RPMI and to Pathology who indicated that the samples did not touch off.    Specimens: none    Complications: None    Condition: Stable      PROCEDURE    Patient Tolerance:  Patient tolerated the procedure well with no immediate  complications  Length of time physician/provider present for 1:1 monitoring during sedation: 30

## 2022-03-31 NOTE — PROGRESS NOTES
Pt returned from IR at 1400 via liter accompanied by nurse.Bilateral buttocks biopsy sites are C/D/I and no hematoma and pt denies pain.Pt tolerating food and fluids.

## 2022-03-31 NOTE — PROGRESS NOTES
Patient tolerated recovery stage well. VSS, buttocks biopy x2  site clean/dry/intact, no hematoma, and denies pain. Patient tolerated PO food and fluids. Teaching was done and discharge instructions were given. Patient ambulated, voided, and PIV was removed. Patient discharged from the hospital via wheel chair to home with friend.

## 2022-03-31 NOTE — PRE-PROCEDURE
GENERAL PRE-PROCEDURE:   Procedure:  Pelvic mass biopsy    Written consent obtained?: Yes    Risks and benefits: Risks, benefits and alternatives were discussed    Consent given by:  Patient  Patient states understanding of procedure being performed: Yes    Patient's understanding of procedure matches consent: Yes    Procedure consent matches procedure scheduled: Yes    Expected level of sedation:  Minimal  Appropriately NPO:  Yes  ASA Class:  2  Mallampati  :  Grade 3- soft palate visible, posterior pharyngeal wall not visible  Lungs:  Lungs clear with good breath sounds bilaterally  Heart:  Normal heart sounds and rate  History & Physical reviewed:  History and physical reviewed and no updates needed  Statement of review:  I have reviewed the lab findings, diagnostic data, medications, and the plan for sedation

## 2022-04-01 LAB

## 2022-04-04 ENCOUNTER — PATIENT OUTREACH (OUTPATIENT)
Dept: ONCOLOGY | Facility: CLINIC | Age: 52
End: 2022-04-04
Payer: COMMERCIAL

## 2022-04-04 LAB
PATH REPORT.COMMENTS IMP SPEC: NORMAL
PATH REPORT.FINAL DX SPEC: NORMAL
PATH REPORT.GROSS SPEC: NORMAL
PATH REPORT.MICROSCOPIC SPEC OTHER STN: NORMAL
PATH REPORT.RELEVANT HX SPEC: NORMAL

## 2022-04-04 NOTE — TELEPHONE ENCOUNTER
Patient called with benign pathology and Dr Diez recommendation of repeat CT scan and  3 month post MRI date.     Patient would like to review with Dr Soto as Dr Soto was her original surgeon    Scheduling contacted to assist with this    Maritza Brasher RN

## 2022-04-06 ENCOUNTER — TRANSFERRED RECORDS (OUTPATIENT)
Dept: HEALTH INFORMATION MANAGEMENT | Facility: CLINIC | Age: 52
End: 2022-04-06
Payer: COMMERCIAL

## 2022-04-19 ENCOUNTER — OFFICE VISIT (OUTPATIENT)
Dept: FAMILY MEDICINE | Facility: OTHER | Age: 52
End: 2022-04-19
Payer: COMMERCIAL

## 2022-04-19 VITALS
BODY MASS INDEX: 41.24 KG/M2 | HEART RATE: 101 BPM | RESPIRATION RATE: 16 BRPM | DIASTOLIC BLOOD PRESSURE: 86 MMHG | WEIGHT: 246 LBS | TEMPERATURE: 97.6 F | SYSTOLIC BLOOD PRESSURE: 122 MMHG | OXYGEN SATURATION: 96 %

## 2022-04-19 DIAGNOSIS — R21 ERYTHEMATOUS RASH: ICD-10-CM

## 2022-04-19 DIAGNOSIS — Z52.4 DONOR OF KIDNEY FOR TRANSPLANT: ICD-10-CM

## 2022-04-19 DIAGNOSIS — N30.01 ACUTE CYSTITIS WITH HEMATURIA: ICD-10-CM

## 2022-04-19 DIAGNOSIS — E66.01 MORBID OBESITY (H): ICD-10-CM

## 2022-04-19 DIAGNOSIS — Z01.818 PREOP GENERAL PHYSICAL EXAM: Primary | ICD-10-CM

## 2022-04-19 DIAGNOSIS — N18.30 STAGE 3 CHRONIC KIDNEY DISEASE, UNSPECIFIED WHETHER STAGE 3A OR 3B CKD (H): ICD-10-CM

## 2022-04-19 DIAGNOSIS — K60.30 ANAL FISTULA: ICD-10-CM

## 2022-04-19 DIAGNOSIS — Z01.812 PRE-OPERATIVE LABORATORY EXAMINATION: ICD-10-CM

## 2022-04-19 DIAGNOSIS — D39.12 OVARIAN TUMOR OF BORDERLINE MALIGNANCY, LEFT: ICD-10-CM

## 2022-04-19 DIAGNOSIS — Z13.220 SCREENING FOR HYPERLIPIDEMIA: ICD-10-CM

## 2022-04-19 DIAGNOSIS — Z71.89 ADVANCED CARE PLANNING/COUNSELING DISCUSSION: ICD-10-CM

## 2022-04-19 LAB
ALBUMIN UR-MCNC: NEGATIVE MG/DL
ANION GAP SERPL CALCULATED.3IONS-SCNC: 4 MMOL/L (ref 3–14)
APPEARANCE UR: CLEAR
BACTERIA #/AREA URNS HPF: ABNORMAL /HPF
BASOPHILS # BLD AUTO: 0 10E3/UL (ref 0–0.2)
BASOPHILS NFR BLD AUTO: 0 %
BILIRUB UR QL STRIP: NEGATIVE
BUN SERPL-MCNC: 16 MG/DL (ref 7–30)
CALCIUM SERPL-MCNC: 8.4 MG/DL (ref 8.5–10.1)
CHLORIDE BLD-SCNC: 108 MMOL/L (ref 94–109)
CHOLEST SERPL-MCNC: 181 MG/DL
CO2 SERPL-SCNC: 27 MMOL/L (ref 20–32)
COLOR UR AUTO: YELLOW
CREAT SERPL-MCNC: 1.07 MG/DL (ref 0.52–1.04)
CREAT UR-MCNC: 110 MG/DL
EOSINOPHIL # BLD AUTO: 0.1 10E3/UL (ref 0–0.7)
EOSINOPHIL NFR BLD AUTO: 1 %
ERYTHROCYTE [DISTWIDTH] IN BLOOD BY AUTOMATED COUNT: 14.1 % (ref 10–15)
FASTING STATUS PATIENT QL REPORTED: YES
GFR SERPL CREATININE-BSD FRML MDRD: 63 ML/MIN/1.73M2
GLUCOSE BLD-MCNC: 100 MG/DL (ref 70–99)
GLUCOSE UR STRIP-MCNC: NEGATIVE MG/DL
HCT VFR BLD AUTO: 41.2 % (ref 35–47)
HDLC SERPL-MCNC: 41 MG/DL
HGB BLD-MCNC: 13.1 G/DL (ref 11.7–15.7)
HGB UR QL STRIP: ABNORMAL
KETONES UR STRIP-MCNC: NEGATIVE MG/DL
LDLC SERPL CALC-MCNC: 97 MG/DL
LEUKOCYTE ESTERASE UR QL STRIP: ABNORMAL
LYMPHOCYTES # BLD AUTO: 2.5 10E3/UL (ref 0.8–5.3)
LYMPHOCYTES NFR BLD AUTO: 26 %
MCH RBC QN AUTO: 28.7 PG (ref 26.5–33)
MCHC RBC AUTO-ENTMCNC: 31.8 G/DL (ref 31.5–36.5)
MCV RBC AUTO: 90 FL (ref 78–100)
MICROALBUMIN UR-MCNC: 58 MG/L
MICROALBUMIN/CREAT UR: 52.73 MG/G CR (ref 0–25)
MONOCYTES # BLD AUTO: 0.7 10E3/UL (ref 0–1.3)
MONOCYTES NFR BLD AUTO: 8 %
NEUTROPHILS # BLD AUTO: 6.2 10E3/UL (ref 1.6–8.3)
NEUTROPHILS NFR BLD AUTO: 65 %
NITRATE UR QL: NEGATIVE
NONHDLC SERPL-MCNC: 140 MG/DL
PH UR STRIP: 6 [PH] (ref 5–7)
PLATELET # BLD AUTO: 400 10E3/UL (ref 150–450)
POTASSIUM BLD-SCNC: 3.7 MMOL/L (ref 3.4–5.3)
RBC # BLD AUTO: 4.57 10E6/UL (ref 3.8–5.2)
RBC #/AREA URNS AUTO: ABNORMAL /HPF
SODIUM SERPL-SCNC: 139 MMOL/L (ref 133–144)
SP GR UR STRIP: 1.02 (ref 1–1.03)
SQUAMOUS #/AREA URNS AUTO: ABNORMAL /LPF
TRIGL SERPL-MCNC: 214 MG/DL
UROBILINOGEN UR STRIP-ACNC: 1 E.U./DL
WBC # BLD AUTO: 9.7 10E3/UL (ref 4–11)
WBC #/AREA URNS AUTO: ABNORMAL /HPF
WBC CLUMPS #/AREA URNS HPF: PRESENT /HPF

## 2022-04-19 PROCEDURE — U0005 INFEC AGEN DETEC AMPLI PROBE: HCPCS | Performed by: PHYSICIAN ASSISTANT

## 2022-04-19 PROCEDURE — 87086 URINE CULTURE/COLONY COUNT: CPT | Performed by: PHYSICIAN ASSISTANT

## 2022-04-19 PROCEDURE — 82043 UR ALBUMIN QUANTITATIVE: CPT | Performed by: PHYSICIAN ASSISTANT

## 2022-04-19 PROCEDURE — 81001 URINALYSIS AUTO W/SCOPE: CPT | Performed by: PHYSICIAN ASSISTANT

## 2022-04-19 PROCEDURE — 80048 BASIC METABOLIC PNL TOTAL CA: CPT | Performed by: PHYSICIAN ASSISTANT

## 2022-04-19 PROCEDURE — U0003 INFECTIOUS AGENT DETECTION BY NUCLEIC ACID (DNA OR RNA); SEVERE ACUTE RESPIRATORY SYNDROME CORONAVIRUS 2 (SARS-COV-2) (CORONAVIRUS DISEASE [COVID-19]), AMPLIFIED PROBE TECHNIQUE, MAKING USE OF HIGH THROUGHPUT TECHNOLOGIES AS DESCRIBED BY CMS-2020-01-R: HCPCS | Performed by: PHYSICIAN ASSISTANT

## 2022-04-19 PROCEDURE — 99214 OFFICE O/P EST MOD 30 MIN: CPT | Performed by: PHYSICIAN ASSISTANT

## 2022-04-19 PROCEDURE — 85025 COMPLETE CBC W/AUTO DIFF WBC: CPT | Performed by: PHYSICIAN ASSISTANT

## 2022-04-19 PROCEDURE — 36415 COLL VENOUS BLD VENIPUNCTURE: CPT | Performed by: PHYSICIAN ASSISTANT

## 2022-04-19 PROCEDURE — 80061 LIPID PANEL: CPT | Performed by: PHYSICIAN ASSISTANT

## 2022-04-19 PROCEDURE — 93000 ELECTROCARDIOGRAM COMPLETE: CPT | Performed by: PHYSICIAN ASSISTANT

## 2022-04-19 RX ORDER — CEPHALEXIN 500 MG/1
500 CAPSULE ORAL 2 TIMES DAILY
Qty: 14 CAPSULE | Refills: 0 | Status: SHIPPED | OUTPATIENT
Start: 2022-04-19 | End: 2022-04-26

## 2022-04-19 RX ORDER — METRONIDAZOLE 7.5 MG/G
GEL TOPICAL 2 TIMES DAILY
Qty: 45 G | Refills: 0 | Status: SHIPPED | OUTPATIENT
Start: 2022-04-19 | End: 2022-05-06

## 2022-04-19 ASSESSMENT — PAIN SCALES - GENERAL: PAINLEVEL: NO PAIN (0)

## 2022-04-19 NOTE — PATIENT INSTRUCTIONS
Preparing for Your Surgery  Getting started  A nurse will call you to review your health history and instructions. They will give you an arrival time based on your scheduled surgery time. Please be ready to share:    Your doctor's clinic name and phone number    Your medical, surgical and anesthesia history    A list of allergies and sensitivities    A list of medicines, including herbal treatments and over-the-counter drugs    Whether the patient has a legal guardian (ask how to send us the papers in advance)  Please tell us if you're pregnant--or if there's any chance you might be pregnant. Some surgeries may injure a fetus (unborn baby), so they require a pregnancy test. Surgeries that are safe for a fetus don't always need a test, and you can choose whether to have one.   If you have a child who's having surgery, please ask for a copy of Preparing for Your Child's Surgery.    Preparing for surgery    Within 30 days of surgery: Have a pre-op exam (sometimes called an H&P, or History and Physical). This can be done at a clinic or pre-operative center.  ? If you're having a , you may not need this exam. Talk to your care team.    At your pre-op exam, talk to your care team about all medicines you take. If you need to stop any medicines before surgery, ask when to start taking them again.  ? We do this for your safety. Many medicines can make you bleed too much during surgery. Some change how well surgery (anesthesia) drugs work.    Call your insurance company to let them know you're having surgery. (If you don't have insurance, call [phone].)    Call your clinic if there's any change in your health. This includes signs of a cold or flu (sore throat, runny nose, cough, rash, fever). It also includes a scrape or scratch near the surgery site.    If you have questions on the day of surgery, call your hospital or surgery center.  COVID testing  You may need to be tested for COVID-19 before having  surgery. If so, your surgical team will give you instructions for scheduling this test, separate from your preoperative history and physical.  Eating and drinking guidelines  For your safety: Unless your surgeon tells you otherwise, follow the guidelines below.    Eat and drink as usual until 8 hours before surgery. After that, no food or milk.    Drink clear liquids until 2 hours before surgery. These are liquids you can see through, like water, Gatorade and Propel Water. You may also have black coffee and tea (no cream or milk).    Nothing by mouth within 2 hours of surgery. This includes gum, candy and breath mints.    If you drink alcohol: Stop drinking it the night before surgery.    If your care team tells you to take medicine on the morning of surgery, it's okay to take it with a sip of water.  Preventing infection    Shower or bathe the night before and morning of your surgery. Follow the instructions your clinic gave you. (If no instructions, use regular soap.)    Don't shave or clip hair near your surgery site. We'll remove the hair if needed.    Don't smoke or vape the morning of surgery. You may chew nicotine gum up to 2 hours before surgery. A nicotine patch is okay.  ? Note: Some surgeries require you to completely quit smoking and nicotine. Check with your surgeon.    Your care team will make every effort to keep you safe from infection. We will:  ? Clean our hands often with soap and water (or an alcohol-based hand rub).  ? Clean the skin at your surgery site with a special soap that kills germs.  ? Give you a special gown to keep you warm. (Cold raises the risk of infection.)  ? Wear special hair covers, masks, gowns and gloves during surgery.  ? Give antibiotic medicine, if prescribed. Not all surgeries need antibiotics.  What to bring on the day of surgery    Photo ID and insurance card    Copy of your health care directive, if you have one    Glasses and hearing aides (bring cases)  ? You can't  wear contacts during surgery    Inhaler and eye drops, if you use them (tell us about these when you arrive)    CPAP machine or breathing device, if you use them    A few personal items, if spending the night    If you have . . .  ? A pacemaker, ICD (cardiac defibrillator) or other implant: Bring the ID card.  ? An implanted stimulator: Bring the remote control.  ? A legal guardian: Bring a copy of the certified (court-stamped) guardianship papers.  Please remove any jewelry, including body piercings. Leave jewelry and other valuables at home.  If you're going home the day of surgery    You must have a responsible adult drive you home. They should stay with you overnight as well.    If you don't have someone to stay with you, and you aren't safe to go home alone, we may keep you overnight. Insurance often won't pay for this.  After surgery  If it's hard to control your pain or you need more pain medicine, please call your surgeon's office.  Questions?   If you have any questions for your care team, list them here: _________________________________________________________________________________________________________________________________________________________________________ ____________________________________ ____________________________________ ____________________________________  For informational purposes only. Not to replace the advice of your health care provider. Copyright   2003, 2019 [facility]. All rights reserved. Clinically reviewed by Estefanía Naranjo MD. [employer] [id number] - REV 07/21.

## 2022-04-19 NOTE — PROGRESS NOTES
[address]  [facility] [address]  Phone: [phone]  Primary Provider: Suellen Millard  Pre-op Performing Provider: SUELLEN MILLARD      PREOPERATIVE EVALUATION:  Today's date: 4/19/2022  Sailaja Mcgee is a 52 year old female who presents for a preoperative evaluation.    Surgical Information:  Surgery/Procedure: Examination under anesthesia with possible fistulotomy and possible abscess drainage  Surgery Location: [facility]   Surgeon: Dr. Winn  Surgery Date: 04/22/2022  Time of Surgery: 2:05 PM  Where patient plans to recover: at home   Fax number for surgical facility: Note does not need to be faxed, will be available electronically in Epic.    Type of Anesthesia Anticipated: MAC    Assessment & Plan     The proposed surgical procedure is considered INTERMEDIATE risk.    Preop general physical exam    Anal fistula  - notes some improvement after having recent biopsy for lower abdominal mass with Oncology. But still present and shifted slightly.     Ovarian tumor of borderline malignancy, left  Sees oncology regularly.   - EKG 12-lead complete w/read - Clinics  - UA Macro with Reflex to Micro and Culture - lab collect; Future    Stage 3 chronic kidney disease, unspecified whether stage 3a or 3b CKD (H)  Updated labs, has been stable.   - EKG 12-lead complete w/read - Clinics  - CBC with platelets and differential; Future  - Basic metabolic panel  (Ca, Cl, CO2, Creat, Gluc, K, Na, BUN); Future  - Albumin Random Urine Quantitative with Creat Ratio; Future    Donor of kidney for transplant  FYI    Advanced care planning/counseling discussion    Screening for hyperlipidemia  - Lipid panel reflex to direct LDL Fasting; Future    Pre-operative laboratory examination  - Asymptomatic COVID-19 Virus (Coronavirus) by PCR    Erythematous rash  Suspect more of a rosacea or acne type reaction located on face only will try topical metronidazole to see if this  helps symptoms.   - metroNIDAZOLE (METROGEL) 0.75 % external gel; Apply topically 2 times daily    Acute cystitis  - Noting some blood when she wipes after urinating and that she has had more pressure on the bladder from potential mass.  Did obtain UA which is concerning for potential infection.  Will start on Cephalexin and waiting on UC results.      Risks and Recommendations:  The patient has the following additional risks and recommendations for perioperative complications:   - No identified additional risk factors other than previously addressed    Medication Instructions:  Patient is on no chronic medications   Ok to continue on Antibiotic.     RECOMMENDATION:  APPROVAL GIVEN to proceed with proposed procedure, without further diagnostic evaluation.      Subjective     HPI related to upcoming procedure: Came into clinic on 2/23/2022 after having a lump for about a month prior to this.  Concern for potential abscess and fistula and was referred to [facility].       Preop Questions 4/19/2022   1. Have you ever had a heart attack or stroke? No   2. Have you ever had surgery on your heart or blood vessels, such as a stent placement, a coronary artery bypass, or surgery on an artery in your head, neck, heart, or legs? No   3. Do you have chest pain with activity? No   4. Do you have a history of  heart failure? No   5. Do you currently have a cold, bronchitis or symptoms of other infection? No   6. Do you have a cough, shortness of breath, or wheezing? No   7. Do you or anyone in your family have previous history of blood clots? No   8. Do you or does anyone in your family have a serious bleeding problem such as prolonged bleeding following surgeries or cuts? No   9. Have you ever had problems with anemia or been told to take iron pills? No   10. Have you had any abnormal blood loss such as black, tarry or bloody stools, or abnormal vaginal bleeding? No   11. Have you ever had a blood transfusion? No    12. Are you willing to have a blood transfusion if it is medically needed before, during, or after your surgery? Yes   13. Have you or any of your relatives ever had problems with anesthesia? YES - Has postoperative nausea and vomiting issues. Also hard to get IV on the left arm.    14. Do you have sleep apnea, excessive snoring or daytime drowsiness? No   15. Do you have any artifical heart valves or other implanted medical devices like a pacemaker, defibrillator, or continuous glucose monitor? No   16. Do you have artificial joints? No   17. Are you allergic to latex? No   18. Is there any chance that you may be pregnant? No     Health Care Directive:  Patient does not have a Health Care Directive or Living Will: Discussed advance care planning with patient; information given to patient to review.    Preoperative Review of :   reviewed - no record of controlled substances prescribed.      Status of Chronic Conditions:  See problem list for active medical problems.  Problems all longstanding and stable, except as noted/documented.  See ROS for pertinent symptoms related to these conditions.    History of ovarian tumor - Being monitored closely by Oncology.     Review of Systems  Constitutional, neuro, ENT, endocrine, pulmonary, cardiac, gastrointestinal, genitourinary, musculoskeletal, integument and psychiatric systems are negative, except as otherwise noted.    Patient Active Problem List    Diagnosis Date Noted     Bilateral carpal tunnel syndrome 03/03/2022     Priority: Medium     Ovarian tumor of borderline malignancy, left 02/24/2022     Priority: Medium     History of seizures 02/18/2020     Priority: Medium     Transient global amnesia 02/16/2020     Priority: Medium     Difficult intubation 07/15/2019     Priority: Medium     S/P BSO (bilateral salpingo-oophorectomy) 07/15/2019     Priority: Medium     Right lower quadrant abdominal mass 06/25/2019     Priority: Medium     CKD (chronic kidney  disease) stage 3, GFR 30-59 ml/min (H) 06/11/2019     Priority: Medium     Obesity (BMI 35.0-39.9) with comorbidity (H) 06/10/2019     Priority: Medium     Anemia 01/11/2014     Priority: Medium     Donor of kidney for transplant 01/09/2014     Priority: Medium     S/P laparoscopic hysterectomy 10/24/2011     Priority: Medium     For menorrhagia.   Cervix and uterus removed.          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     Past Surgical History:   Procedure Laterality Date     COLONOSCOPY N/A 7/10/2019    Procedure: COLONOSCOPY;  Surgeon: Sheba Tenorio MD;  [facility]     CYSTOSCOPY  10/24/2011    Procedure:CYSTOSCOPY; Surgeon:NAYELY MADRIGAL; Location:[facility] OR     HC BRAIN MAPPING, 1ST HOUR MD ATTENDANCE  2001    ablation of seizure focus     HC REDUCTION OF LARGE BREAST  2001     HYSTERECTOMY TOTAL ABD, [place] SALPINGO-OOPHORECTOMY, NODE DISSECTION, TUMOR DEBULKING, COMBINED Bilateral 7/15/2019    Procedure: Exploratory Laparotomy, Total Abdominal Hysterectomy, Removal Of Both Tubes And Ovaries, Omentectomy, Appendectomy, Peritoneal Biopsies;  Surgeon: Ivy Soto MD;  [facility] OR     HYSTERECTOMY VAGINAL  10/24/2011    Procedure:HYSTERECTOMY VAGINAL; vaginal hysterectomy and cystoscopy; Surgeon:NAYELY MADRIGAL; Location:[facility] OR     HYSTEROSCOPY,ABLATION ENDOMETRIUM  2001     ZZC LAP,DONOR KIDNEY REMOV,LIVING  4/2/2009     Left laparoscopic donor nephrectomy.  Donating to brother. [facility].     Current Outpatient Medications   Medication Sig Dispense Refill     acetaminophen (TYLENOL) 325 MG tablet Take 2 tablets (650 mg) by mouth every 6 hours 12 tablet 0     Bioflavonoid Products (VITAMIN C) CHEW Take 1 tablet by mouth daily       cephALEXin (KEFLEX) 500 MG capsule Take 1 capsule (500 mg) by mouth 2 times daily for 7 days 14 capsule 0     metroNIDAZOLE (METROGEL) 0.75 % external gel Apply topically 2 times daily 45 g 0     mvw complete formulation (CHEWABLES) tablet Take 1 tablet by mouth daily       omeprazole (PRILOSEC) 10 MG DR capsule Take 40 mg by mouth every morning        UNABLE TO FIND Patient doesn't know name of medication but uses a cream for buttocks         Allergies   Allergen Reactions     Aspirin Rash     Rash on face        Social History     Tobacco Use     Smoking status: Never Smoker     Smokeless tobacco: Never Used     Tobacco comment: no smokers in the household   Substance Use Topics     Alcohol use: Yes     Comment: rarely     Family History   Problem Relation Age of Onset     Cancer Paternal Grandfather         colon cancer     Diabetes Maternal Grandmother      Cerebrovascular Disease Maternal Grandmother      Arthritis Maternal Grandmother      Arthritis Mother      Cardiovascular Maternal Grandfather         heart attack x2     Heart Disease Maternal Grandfather         heart attack x2     Gastrointestinal Disease Paternal Grandmother         liver failure     Genitourinary Problems Brother         kidney failure     Cancer Father         lung cancer diagnosed 7/11 due to chemical exposure war     Cancer Maternal Aunt      Asthma No family hx of      C.A.D. No family hx of      Hypertension No family hx of      Breast Cancer No family hx of      Cancer - colorectal No family hx of      Prostate Cancer No family hx of      Alzheimer Disease No family hx of      Blood Disease No family hx of       Circulatory No family hx of      Eye Disorder No family hx of      Lipids No family hx of      Musculoskeletal Disorder No family hx of      Neurologic Disorder No family hx of      Respiratory No family hx of      Thyroid Disease No family hx of      History   Drug Use No         Objective     /86   Pulse 101   Temp 97.6  F (36.4  C) (Temporal)   Resp 16   Wt 111.6 kg (246 lb)   LMP 09/19/2011   SpO2 96%   BMI 41.24 kg/m      Physical Exam    GENERAL APPEARANCE: healthy, alert and no distress     EYES: EOMI, PERRL     HENT: ear canals and TM's normal and nose and mouth without ulcers or lesions     NECK: no adenopathy, no asymmetry, masses, or scars and thyroid normal to palpation     RESP: lungs clear to auscultation - no rales, rhonchi or wheezes     CV: regular rates and rhythm, normal S1 S2, no S3 or S4 and no murmur, click or rub     ABDOMEN:  soft, nontender, no HSM or masses and bowel sounds normal     MS: extremities normal- no gross deformities noted, no evidence of inflammation in joints, FROM in all extremities.     SKIN: Small pustules scattered on face with erythema.      NEURO: Normal strength and tone, sensory exam grossly normal, mentation intact and speech normal     PSYCH: mentation appears normal. and affect normal/bright     LYMPHATICS: No cervical adenopathy    Recent Labs   Lab Test 03/31/22  1230 03/11/22  1159   HGB  --  13.2   PLT  --  364   INR 1.00  --    NA  --  139   POTASSIUM  --  3.9   CR  --  1.13*        Diagnostics:  Recent Results (from the past 24 hour(s))   Basic metabolic panel  (Ca, Cl, CO2, Creat, Gluc, K, Na, BUN)    Collection Time: 04/19/22 11:37 AM   Result Value Ref Range    Sodium 139 133 - 144 mmol/L    Potassium 3.7 3.4 - 5.3 mmol/L    Chloride 108 94 - 109 mmol/L    Carbon Dioxide (CO2) 27 20 - 32 mmol/L    Anion Gap 4 3 - 14 mmol/L    Urea Nitrogen 16 7 - 30 mg/dL    Creatinine 1.07 (H) 0.52 - 1.04 mg/dL    Calcium 8.4 (L) 8.5 - 10.1 mg/dL    Glucose  100 (H) 70 - 99 mg/dL    GFR Estimate 63 >60 mL/min/1.73m2   Lipid panel reflex to direct LDL Fasting    Collection Time: 04/19/22 11:37 AM   Result Value Ref Range    Cholesterol 181 <200 mg/dL    Triglycerides 214 (H) <150 mg/dL    Direct Measure HDL 41 (L) >=50 mg/dL    LDL Cholesterol Calculated 97 <=100 mg/dL    Non HDL Cholesterol 140 (H) <130 mg/dL    Patient Fasting > 8hrs? Yes    Albumin Random Urine Quantitative with Creat Ratio    Collection Time: 04/19/22 11:37 AM   Result Value Ref Range    Creatinine Urine mg/dL 110 mg/dL    Albumin Urine mg/L 58 mg/L    Albumin Urine mg/g Cr 52.73 (H) 0.00 - 25.00 mg/g Cr   UA Macro with Reflex to Micro and Culture - lab collect    Collection Time: 04/19/22 11:37 AM    Specimen: Urine, Clean Catch   Result Value Ref Range    Color Urine Yellow Colorless, Straw, Light Yellow, Yellow    Appearance Urine Clear Clear    Glucose Urine Negative Negative mg/dL    Bilirubin Urine Negative Negative    Ketones Urine Negative Negative mg/dL    Specific Gravity Urine 1.025 1.003 - 1.035    Blood Urine Moderate (A) Negative    pH Urine 6.0 5.0 - 7.0    Protein Albumin Urine Negative Negative mg/dL    Urobilinogen Urine 1.0 0.2, 1.0 E.U./dL    Nitrite Urine Negative Negative    Leukocyte Esterase Urine Moderate (A) Negative   CBC with platelets and differential    Collection Time: 04/19/22 11:37 AM   Result Value Ref Range    WBC Count 9.7 4.0 - 11.0 10e3/uL    RBC Count 4.57 3.80 - 5.20 10e6/uL    Hemoglobin 13.1 11.7 - 15.7 g/dL    Hematocrit 41.2 35.0 - 47.0 %    MCV 90 78 - 100 fL    MCH 28.7 26.5 - 33.0 pg    MCHC 31.8 31.5 - 36.5 g/dL    RDW 14.1 10.0 - 15.0 %    Platelet Count 400 150 - 450 10e3/uL    % Neutrophils 65 %    % Lymphocytes 26 %    % Monocytes 8 %    % Eosinophils 1 %    % Basophils 0 %    Absolute Neutrophils 6.2 1.6 - 8.3 10e3/uL    Absolute Lymphocytes 2.5 0.8 - 5.3 10e3/uL    Absolute Monocytes 0.7 0.0 - 1.3 10e3/uL    Absolute Eosinophils 0.1 0.0 - 0.7  10e3/uL    Absolute Basophils 0.0 0.0 - 0.2 10e3/uL   Urine Microscopic Exam    Collection Time: 04/19/22 11:37 AM   Result Value Ref Range    Bacteria Urine Few (A) None Seen /HPF    RBC Urine 0-2 0-2 /HPF /HPF    WBC Urine 5-10 (A) 0-5 /HPF /HPF    Squamous Epithelials Urine Few (A) None Seen /LPF    WBC Clumps Urine Present (A) None Seen /HPF      EKG: Normal Sinus Rhythm, low voltage, unchanged from previous tracings    Revised Cardiac Risk Index (RCRI):  The patient has the following serious cardiovascular risks for perioperative complications:   - No serious cardiac risks = 0 points     RCRI Interpretation: 0 points: Class I (very low risk - 0.4% complication rate)           Signed Electronically by: Suellen Millard PA-C  Copy of this evaluation report is provided to requesting physician.

## 2022-04-20 ENCOUNTER — TELEPHONE (OUTPATIENT)
Dept: FAMILY MEDICINE | Facility: OTHER | Age: 52
End: 2022-04-20
Payer: COMMERCIAL

## 2022-04-20 LAB — SARS-COV-2 RNA RESP QL NAA+PROBE: NEGATIVE

## 2022-04-20 NOTE — TELEPHONE ENCOUNTER
Pt was disconnected. Called pt back. She was wondering why she was prescribed the keflex? Advised her that her labs show that she may have a urinary infection. Still waiting on culture but should start antibiotic. Pt verbalized understanding and denies further questions.     Faith Blair, ANSLEYN, RN, PHN  Registered Nurse-Clinic Triage  [facility]  4/20/2022 at 10:18 AM

## 2022-04-21 ENCOUNTER — TELEPHONE (OUTPATIENT)
Dept: FAMILY MEDICINE | Facility: OTHER | Age: 52
End: 2022-04-21
Payer: COMMERCIAL

## 2022-04-21 ENCOUNTER — NURSE TRIAGE (OUTPATIENT)
Dept: NURSING | Facility: CLINIC | Age: 52
End: 2022-04-21
Payer: COMMERCIAL

## 2022-04-21 ENCOUNTER — ONCOLOGY VISIT (OUTPATIENT)
Dept: ONCOLOGY | Facility: CLINIC | Age: 52
End: 2022-04-21
Attending: OBSTETRICS & GYNECOLOGY
Payer: COMMERCIAL

## 2022-04-21 VITALS
WEIGHT: 246 LBS | HEART RATE: 94 BPM | RESPIRATION RATE: 18 BRPM | SYSTOLIC BLOOD PRESSURE: 134 MMHG | OXYGEN SATURATION: 98 % | BODY MASS INDEX: 41.24 KG/M2 | DIASTOLIC BLOOD PRESSURE: 87 MMHG | TEMPERATURE: 97.9 F

## 2022-04-21 DIAGNOSIS — D39.12 OVARIAN TUMOR OF BORDERLINE MALIGNANCY, LEFT: Primary | ICD-10-CM

## 2022-04-21 LAB — BACTERIA UR CULT: NORMAL

## 2022-04-21 PROCEDURE — 57100 BIOPSY VAGINAL MUCOSA SIMPLE: CPT

## 2022-04-21 PROCEDURE — G0463 HOSPITAL OUTPT CLINIC VISIT: HCPCS | Mod: 25

## 2022-04-21 PROCEDURE — 88305 TISSUE EXAM BY PATHOLOGIST: CPT | Mod: 26 | Performed by: PATHOLOGY

## 2022-04-21 PROCEDURE — 57100 BIOPSY VAGINAL MUCOSA SIMPLE: CPT | Performed by: OBSTETRICS & GYNECOLOGY

## 2022-04-21 PROCEDURE — 99215 OFFICE O/P EST HI 40 MIN: CPT | Mod: 25 | Performed by: OBSTETRICS & GYNECOLOGY

## 2022-04-21 PROCEDURE — 88305 TISSUE EXAM BY PATHOLOGIST: CPT | Mod: TC | Performed by: OBSTETRICS & GYNECOLOGY

## 2022-04-21 ASSESSMENT — PAIN SCALES - GENERAL: PAINLEVEL: NO PAIN (0)

## 2022-04-21 NOTE — LETTER
"    2022         RE: Sailaja Mcgee  [address] [facility] [address]        Dear Colleague,    Thank you for referring your patient, Sailaja Mcgee, to the [facility]. Please see a copy of my visit note below.    GYN Oncology Follow Up Visit    Referring provider:    Bert Recinos MD  [address] [place] [address]   RE: Sailaja Mcgee  : 1970  ROGER: 2022       CC: pelvic mass, history of borderline mucinous tumor.     HPI: Ms Sailaja Mcgee is a 49 year old year old female who presents for follow up of her borderline mucinous tumor and new symtpoms.       She noted a \"lump\" on the inside of her anus  that hurts when she had a bowel movement. She reports this was evaluated by CR surgery for this and now has plans for a fistula repair (notes from CR not in our system). Colorectal surgery ordered an MRI which showed a mass in the lower pelvis/upper vagina.     She then saw my partner, Dr Diez, while I was away who ordered an IR biopsy. The biopsy was non diagnostic.     Today, She reports bladder urgency and some urge to void but then cannot void. She notes some pink mucous when she wipes. She is now having some more red discharge.       Treatment history:   19: MRI of back for back pain  19: CT scan showing large pelvic mass. Ca125 282, CEA 6.9,   7/15/19: XLap/BSO/Appy/Omx/Biopsies/CL. 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   3/4/2022: Pelvic MRI (for rectal symptoms)   3/11/2022: CT C/A/P with complex cystic solid mass of low hemipelvis 5.4cm x 3.5cm. Stable lymphadenopathy.   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       Past Surgical History:   Procedure Laterality Date     COLONOSCOPY N/A 7/10/2019    Procedure: COLONOSCOPY;  Surgeon: Sheba Tenorio MD;  [facility]     CYSTOSCOPY  10/24/2011    Procedure:CYSTOSCOPY; Surgeon:NAYELY MADRIGAL; Location:[facility] OR     HC BRAIN MAPPING, 1ST HOUR MD ATTENDANCE      ablation of seizure focus     HC REDUCTION OF LARGE BREAST       HYSTERECTOMY TOTAL ABD, [place] SALPINGO-OOPHORECTOMY, NODE DISSECTION, TUMOR DEBULKING, COMBINED Bilateral 7/15/2019    Procedure: Exploratory Laparotomy, Total Abdominal Hysterectomy, Removal Of Both Tubes And Ovaries, Omentectomy, Appendectomy, Peritoneal Biopsies;  Surgeon: Ivy Soto MD;  Location: [facility] OR     HYSTERECTOMY VAGINAL  10/24/2011    Procedure:HYSTERECTOMY VAGINAL; vaginal hysterectomy and cystoscopy; Surgeon:NAYELY MADRIGAL; Location:[facility] OR     HYSTEROSCOPY,ABLATION ENDOMETRIUM       ZZC LAP,DONOR KIDNEY REMOV,LIVING  2009    Left laparoscopic donor nephrectomy.  Donating to brother. [facility].        OBGYN history and Health Maintenance:    Last Pap Smear: , NILM prior to hysterectomy  Last Mammogram:never  Last Colonoscopy: 2019 at [facility] prior to surgery, normal    Current Outpatient Medications   Medication Sig Dispense Refill     acetaminophen (TYLENOL) 325 MG tablet Take 2 tablets (650 mg) by mouth every 6 hours 12 tablet 0     Bioflavonoid Products (VITAMIN C) CHEW Take 1 tablet by mouth daily       cephALEXin (KEFLEX) 500 MG capsule Take 1 capsule (500 mg) by mouth 2 times daily for 7 days 14 capsule 0     metroNIDAZOLE (METROGEL) 0.75 % external gel Apply topically 2 times daily 45 g 0     mvw complete formulation (CHEWABLES) tablet Take 1 tablet by mouth daily       omeprazole (PRILOSEC) 10 MG DR capsule Take 40 mg by mouth every morning        UNABLE TO FIND Patient doesn't know name of medication but uses a cream for  buttocks              Allergies   Allergen Reactions     Aspirin Rash     Rash on face        Social History:  Social History     Tobacco Use     Smoking status: Never Smoker     Smokeless tobacco: Never Used     Tobacco comment: no smokers in the household   Substance Use Topics     Alcohol use: Yes     Comment: rarely     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 Linear speech, appropriate affect  Pulmonary:  Normal respiratory effort  GI: Large vertical midline incision well healed. No obvious hernia. Obese.    Lymph: No enlarge lymph nodes in neck or groin  :NEFG some dark vaginal drainage. On speculum exam she has a 2cm upper vagina polyp. Bright red. Biopsy obtained with forceps. Brisk bleeding well controlled with Monsels solution and pressure. On BME I can feel this polypoid area and cannot obviously feel the upper vaginal mass. Limited due to habitus.   On rectal exam she has external hemorrhoids, I cannot appreciate an obvious abscess or fistula (no anal speculum exam performed)     Labs/imaging (Personally reviewed today,  4/21/2022)    CT 3/11/2022:   IMPRESSION:  1.  Complex cystic and mixed cystic and solid mass low hemipelvis,  slightly to the left of midline, which measures up to 5.4 cm AP x 3.5  cm transverse x 3.6 cm SI. This approximates the posterior wall of the  urinary bladder; it is difficult to determine whether it invades the  urinary bladder. It is  highly suspect for recurrence of ovarian  neoplasm.   2.  Stable pattern of subcentimeter retroperitoneal and  extraperitoneal lymph nodes. No new lymphadenopathy.  3.  Left nephrectomy.  4.  Atherosclerosis.  5.  Hepatic steatosis.  6.  Sequela of remote granulomatous infection.  7.  Small hiatal hernia.     [Access Center: Complex pelvic mass concerning for ovarian neoplasm  recurrence]     This report will be copied to the [facility] to ensure a  provider acknowledges the finding. Access Center is available Monday  through Friday 8am-3:30 pm.      ANICETO KAPOOR MD     MRI 3/4/22 (summarized outside read)  Fluid edema and inflammation of low intersphincteric space, presumabley represents the reported fistula although no communication with the anal canal is seen. (see full report scanned in). Also complex cyst tethered to superior aspect of the vaginal cuff and displays mass effect on the posterior bladder           Assessment:    Sailaja Mcgee is a 52 year old woman with a history of stage 1C3 borderline mucinous ovarian neoplasm now with a pelvic and vaginal mass       Plan:     1.)   Stage 1C borderline mucinous ovarian tumor: Now with both rectal and vaginal symptoms. She is seeing colorectal surgery. I cannot see these notes but she is having a fistula repair tomorrow. I have reviewed imaging, labs and also examined the patient. She has a concerning vaginal lesion that is likely causing her vagina drainage. Pathology pending. MRI is concerning for cystic lesion in upper vagina. This is possible recurrence of her borderline tumor (versus inclusion cysts). Given vaginal findings, I am very worried about recurrence.  is normal (previously was elevated)    -Followup vaginal pathology  -Will discuss MR findings with radiology. Consider repeat MR with vaginal gel  - If recurrence, would plan PET CT scan and then consider surgery vs systemic therapy (all path depending) . Discussed these  options with her today.   - followup w me in 2 weeks for treatment planning      2.) Genetic risk factors were assessed: referral not indicated at this time    3.) Anal fistula: not appreciated on my exam. Ok to proceed with surgery with CR tomorrow. I do no think this is obviously related to the pelvic mass.     55 minutes total time today including reviewing labs, imaging, exam, review of OSH records, documentation, counseling. Biopsy took 6 minutes total time.       Ivy Soto MD    Department of Ob/Gyn and Women's Health  Division of Gynecologic Oncology  [facility]  [phone]

## 2022-04-21 NOTE — TELEPHONE ENCOUNTER
"Called patient to triage. No answer. LM to return call and speak with triage nurse.     FYI:  Patient was seen today for gynecologic oncology appointment. New bleeding symptoms noted during visit - \"concerning vaginal lesion that is likely causing her vagina drainage\"      Ankita PANCHALN, RN   "

## 2022-04-21 NOTE — NURSING NOTE
"Oncology Rooming Note    April 21, 2022 3:13 PM   Sailaja Mcgee is a 52 year old female who presents for:    Chief Complaint   Patient presents with     Oncology Clinic Visit     Return; Ovarian Tumor      Initial Vitals: /87   Pulse 94   Temp 97.9  F (36.6  C) (Oral)   Resp 18   Wt 111.6 kg (246 lb)   LMP 09/19/2011   SpO2 98%   BMI 41.24 kg/m   Estimated body mass index is 41.24 kg/m  as calculated from the following:    Height as of 3/3/22: 1.645 m (5' 4.76\").    Weight as of this encounter: 111.6 kg (246 lb). Body surface area is 2.26 meters squared.  No Pain (0) Comment: Data Unavailable   Patient's last menstrual period was 09/19/2011.  Allergies reviewed: Yes  Medications reviewed: Yes    Medications: Medication refills not needed today.  Pharmacy name entered into [email or web address]: [facility] [address] [place] [id number] [facility]    Clinical concerns: Pt has some concerns to discuss with provier.       Janet Correa CMA              "

## 2022-04-21 NOTE — CONFIDENTIAL NOTE
Please triage.  Is she having any pain, back pain, fevers, chills, etc.       Suellen Millard PA-C

## 2022-04-21 NOTE — TELEPHONE ENCOUNTER
----- Message from Demetria Warren sent at 4/21/2022  1:42 PM CDT -----  Called patient and relayed message. Patient has a question about the blood coming from her sanitary area. She states that she usually has a pinkish discharge but as of yesterday she has bright red discharge when she wipes as if she has her period but she does not have a uterus to be able to have a period and has no idea where it is coming from and would like to know what is going on. Please advise.    Keyona Warren

## 2022-04-21 NOTE — NURSING NOTE
Prior to the start of the procedure and with procedural staff participation, I verbally confirmed the patient s identity using two indicators, relevant allergies, that the procedure was appropriate and matched the consent or emergent situation, and that the correct equipment/implants were available. Immediately prior to starting the procedure I conducted the Time Out with the procedural staff and re-confirmed the patient s name, procedure, and site/side. (The [facility] universal protocol was followed.)  Yes    Sedation (Moderate or Deep): None    Post procedure: 0    COMPLETED   Vaginal mass biopsy    Maritza Brasher RN

## 2022-04-21 NOTE — TELEPHONE ENCOUNTER
"Message rec'd to call back.   Seen in provider @ [facility]. They found I have another lump or something in my vagina. A test was taken. (vaginal bx) Surgery was scheduled for tomorrow for another lump.   Per chart review: per NONA Millard PA-C.=\"Please triage: Is she having any pain, back pain, fevers, chills, etc. (no)\"  She has pink-bright red discharge from the vagina, previous hysterectomy. Amount of flow: little spots.     Message will be forwarded to provider.     Oksana Parikh RN Triage Nurse Advisor 5:40 PM 4/21/2022  Reason for Disposition    Bleeding or spotting occurs after hysterectomy    Additional Information    Negative: Shock suspected (e.g., cold/pale/clammy skin, too weak to stand, low BP, rapid pulse)    Negative: Difficult to awaken or acting confused (e.g., disoriented, slurred speech)    Negative: Passed out (i.e., lost consciousness, collapsed and was not responding)    Negative: Sounds like a life-threatening emergency to the triager    Negative: Followed a genital area injury    Negative: Pregnant > 20 weeks  (5 months or more)    Negative: Pregnant < 20 weeks  (less than 5 months)    Negative: Postpartum (from 0 to 6 weeks after delivery)    Negative: Bleeding occurring > 12 months after menopause    Negative: Bleeding from sexual abuse or rape    Negative: [1] Vaginal discharge is main symptom AND [2] small amount of blood    Negative: SEVERE abdominal pain    Negative: SEVERE dizziness (e.g., unable to stand, requires support to walk, feels like passing out now)    Negative: SEVERE vaginal bleeding (e.g., soaking 2 pads or tampons per hour and present 2 or more hours; 1 menstrual cup every 2 hours)    Negative: Patient sounds very sick or weak to the triager    Negative: MODERATE vaginal bleeding (i.e., soaking 1 pad or tampon per hour and present > 6 hours; 1 menstrual cup every 6 hours)    Negative: [1] Constant abdominal pain AND [2] present > 2 hours    Negative: Pale skin (pallor) of new " onset or worsening    Negative: Passed tissue (e.g., gray-white)    Negative: Taking Coumadin (warfarin) or other strong blood thinner, or known bleeding disorder (e.g., thrombocytopenia)    Negative: [1] Skin bruises or nosebleed AND [2] not caused by an injury    Negative: [1] Periods with > 6 soaked pads or tampons per day AND [2] last > 7 days    Negative: [1] Bleeding or spotting after procedure (e.g., biopsy) or pelvic examination (e.g., pap smear) AND [2] lasts > 7 days    Negative: Periods with > 6 soaked pads or tampons per day    Negative: Periods last > 7 days    Negative: [1] Uses menstrual cups AND [2] more than 80 ml blood per menstrual period.    Negative: [1] Missed period AND [2] has occurred 2 or more times in the last year    Negative: [1] Bleeding or spotting between regular periods AND [2] occurs more than three cycles (3 months) this past year    Negative: [1] Menstrual cycle < 21 days OR > 35 days AND [2] occurs more than two cycles (2 months) this past year    Negative: [1] Bleeding or spotting between regular periods AND [2] occurs more than three cycles (3 months) AND [3] using birth control medicine (pills, patch, Depo-Provera, Implanon, vaginal ring, Mirena IUD)    Protocols used: VAGINAL BLEEDING - [id number]

## 2022-04-22 ENCOUNTER — ANESTHESIA (OUTPATIENT)
Dept: SURGERY | Facility: CLINIC | Age: 52
End: 2022-04-22
Payer: COMMERCIAL

## 2022-04-22 ENCOUNTER — HOSPITAL ENCOUNTER (OUTPATIENT)
Facility: CLINIC | Age: 52
Discharge: HOME | End: 2022-04-22
Attending: COLON & RECTAL SURGERY | Admitting: COLON & RECTAL SURGERY
Payer: COMMERCIAL

## 2022-04-22 ENCOUNTER — ANESTHESIA EVENT (OUTPATIENT)
Dept: SURGERY | Facility: CLINIC | Age: 52
End: 2022-04-22
Payer: COMMERCIAL

## 2022-04-22 VITALS
WEIGHT: 245.1 LBS | RESPIRATION RATE: 14 BRPM | DIASTOLIC BLOOD PRESSURE: 82 MMHG | HEART RATE: 67 BPM | BODY MASS INDEX: 39.39 KG/M2 | TEMPERATURE: 96.1 F | HEIGHT: 66 IN | OXYGEN SATURATION: 97 % | SYSTOLIC BLOOD PRESSURE: 143 MMHG

## 2022-04-22 DIAGNOSIS — K60.30 ANAL FISTULA: Primary | ICD-10-CM

## 2022-04-22 PROCEDURE — 710N000012 HC RECOVERY PHASE 2, PER MINUTE: Performed by: COLON & RECTAL SURGERY

## 2022-04-22 PROCEDURE — 999N000141 HC STATISTIC PRE-PROCEDURE NURSING ASSESSMENT: Performed by: COLON & RECTAL SURGERY

## 2022-04-22 PROCEDURE — 258N000003 HC RX IP 258 OP 636: Performed by: NURSE ANESTHETIST, CERTIFIED REGISTERED

## 2022-04-22 PROCEDURE — 250N000009 HC RX 250: Performed by: COLON & RECTAL SURGERY

## 2022-04-22 PROCEDURE — 250N000009 HC RX 250: Performed by: NURSE ANESTHETIST, CERTIFIED REGISTERED

## 2022-04-22 PROCEDURE — 250N000011 HC RX IP 250 OP 636: Performed by: NURSE ANESTHETIST, CERTIFIED REGISTERED

## 2022-04-22 PROCEDURE — 370N000017 HC ANESTHESIA TECHNICAL FEE, PER MIN: Performed by: COLON & RECTAL SURGERY

## 2022-04-22 PROCEDURE — 360N000075 HC SURGERY LEVEL 2, PER MIN: Performed by: COLON & RECTAL SURGERY

## 2022-04-22 PROCEDURE — 272N000001 HC OR GENERAL SUPPLY STERILE: Performed by: COLON & RECTAL SURGERY

## 2022-04-22 PROCEDURE — 250N000013 HC RX MED GY IP 250 OP 250 PS 637: Performed by: COLON & RECTAL SURGERY

## 2022-04-22 RX ORDER — OXYCODONE HYDROCHLORIDE 5 MG/1
5 TABLET ORAL
Status: DISCONTINUED | OUTPATIENT
Start: 2022-04-22 | End: 2022-04-22 | Stop reason: HOSPADM

## 2022-04-22 RX ORDER — OXYCODONE HYDROCHLORIDE 5 MG/1
5 TABLET ORAL EVERY 4 HOURS PRN
Qty: 6 TABLET | Refills: 0 | Status: ON HOLD | OUTPATIENT
Start: 2022-04-22 | End: 2022-05-16

## 2022-04-22 RX ORDER — ACETAMINOPHEN 325 MG/1
975 TABLET ORAL ONCE
Status: COMPLETED | OUTPATIENT
Start: 2022-04-22 | End: 2022-04-22

## 2022-04-22 RX ORDER — SODIUM CHLORIDE, SODIUM LACTATE, POTASSIUM CHLORIDE, CALCIUM CHLORIDE 600; 310; 30; 20 MG/100ML; MG/100ML; MG/100ML; MG/100ML
INJECTION, SOLUTION INTRAVENOUS CONTINUOUS PRN
Status: DISCONTINUED | OUTPATIENT
Start: 2022-04-22 | End: 2022-04-22

## 2022-04-22 RX ORDER — KETAMINE HYDROCHLORIDE 10 MG/ML
INJECTION INTRAMUSCULAR; INTRAVENOUS PRN
Status: DISCONTINUED | OUTPATIENT
Start: 2022-04-22 | End: 2022-04-22

## 2022-04-22 RX ORDER — BUPIVACAINE HYDROCHLORIDE AND EPINEPHRINE 2.5; 5 MG/ML; UG/ML
INJECTION, SOLUTION EPIDURAL; INFILTRATION; INTRACAUDAL; PERINEURAL PRN
Status: DISCONTINUED | OUTPATIENT
Start: 2022-04-22 | End: 2022-04-22 | Stop reason: HOSPADM

## 2022-04-22 RX ORDER — ONDANSETRON 2 MG/ML
INJECTION INTRAMUSCULAR; INTRAVENOUS PRN
Status: DISCONTINUED | OUTPATIENT
Start: 2022-04-22 | End: 2022-04-22

## 2022-04-22 RX ORDER — LABETALOL HYDROCHLORIDE 5 MG/ML
INJECTION, SOLUTION INTRAVENOUS PRN
Status: DISCONTINUED | OUTPATIENT
Start: 2022-04-22 | End: 2022-04-22

## 2022-04-22 RX ORDER — LIDOCAINE 40 MG/G
CREAM TOPICAL
Status: DISCONTINUED | OUTPATIENT
Start: 2022-04-22 | End: 2022-04-22 | Stop reason: HOSPADM

## 2022-04-22 RX ORDER — LIDOCAINE HYDROCHLORIDE 10 MG/ML
INJECTION, SOLUTION INFILTRATION; PERINEURAL PRN
Status: DISCONTINUED | OUTPATIENT
Start: 2022-04-22 | End: 2022-04-22

## 2022-04-22 RX ORDER — PROPOFOL 10 MG/ML
INJECTION, EMULSION INTRAVENOUS CONTINUOUS PRN
Status: DISCONTINUED | OUTPATIENT
Start: 2022-04-22 | End: 2022-04-22

## 2022-04-22 RX ORDER — SODIUM CHLORIDE, SODIUM LACTATE, POTASSIUM CHLORIDE, CALCIUM CHLORIDE 600; 310; 30; 20 MG/100ML; MG/100ML; MG/100ML; MG/100ML
INJECTION, SOLUTION INTRAVENOUS CONTINUOUS
Status: DISCONTINUED | OUTPATIENT
Start: 2022-04-22 | End: 2022-04-22 | Stop reason: HOSPADM

## 2022-04-22 RX ORDER — ACETAMINOPHEN 160 MG
TABLET,DISINTEGRATING ORAL PRN
Status: DISCONTINUED | OUTPATIENT
Start: 2022-04-22 | End: 2022-04-22 | Stop reason: HOSPADM

## 2022-04-22 RX ADMIN — Medication 20 MG: at 14:28

## 2022-04-22 RX ADMIN — MIDAZOLAM 2 MG: 1 INJECTION INTRAMUSCULAR; INTRAVENOUS at 14:09

## 2022-04-22 RX ADMIN — ONDANSETRON HYDROCHLORIDE 4 MG: 2 INJECTION, SOLUTION INTRAVENOUS at 14:20

## 2022-04-22 RX ADMIN — ACETAMINOPHEN 975 MG: 325 TABLET, FILM COATED ORAL at 12:32

## 2022-04-22 RX ADMIN — LABETALOL HYDROCHLORIDE 10 MG: 5 INJECTION, SOLUTION INTRAVENOUS at 14:35

## 2022-04-22 RX ADMIN — SODIUM CHLORIDE, POTASSIUM CHLORIDE, SODIUM LACTATE AND CALCIUM CHLORIDE: 600; 310; 30; 20 INJECTION, SOLUTION INTRAVENOUS at 13:45

## 2022-04-22 RX ADMIN — LIDOCAINE HYDROCHLORIDE 20 MG: 10 INJECTION, SOLUTION INFILTRATION; PERINEURAL at 14:18

## 2022-04-22 RX ADMIN — PROPOFOL 75 MCG/KG/MIN: 10 INJECTION, EMULSION INTRAVENOUS at 14:18

## 2022-04-22 RX ADMIN — SODIUM CHLORIDE, POTASSIUM CHLORIDE, SODIUM LACTATE AND CALCIUM CHLORIDE: 600; 310; 30; 20 INJECTION, SOLUTION INTRAVENOUS at 14:56

## 2022-04-22 NOTE — DISCHARGE INSTRUCTIONS

## 2022-04-22 NOTE — ANESTHESIA POSTPROCEDURE EVALUATION
Patient: Sailaja Mcgee    Procedure: Procedure(s):  Examination under anesthesia, seton placement, abcess drainage       Anesthesia Type:  MAC    Note:  Disposition: Outpatient   Postop Pain Control: Uneventful            Sign Out: Well controlled pain   PONV: No   Neuro/Psych: Uneventful            Sign Out: Acceptable/Baseline neuro status   Airway/Respiratory: Uneventful            Sign Out: Acceptable/Baseline resp. status   CV/Hemodynamics: Uneventful            Sign Out: Acceptable CV status; No obvious hypovolemia; No obvious fluid overload   Other NRE: NONE   DID A NON-ROUTINE EVENT OCCUR? No           Last vitals:  Vitals Value Taken Time   /82 04/22/22 1515   Temp 96.1  F (35.6  C) 04/22/22 1505   Pulse 67 04/22/22 1515   Resp 14 04/22/22 1515   SpO2 97 % 04/22/22 1515       Electronically Signed By: Ankita Marks MD  April 22, 2022  5:22 PM

## 2022-04-22 NOTE — OP NOTE
Procedure Date: 04/22/2022    PREOPERATIVE DIAGNOSIS:  Right anterior ischiorectal abscess and fistula.    POSTOPERATIVE DIAGNOSIS:  Right anterior ischiorectal abscess and fistula.    PROCEDURE:  Exam under anesthesia, drainage of abscess and placement of seton.    SURGEON:  Pham Winn MD    ASSISTANT:  None.    INDICATIONS FOR PROCEDURE:  Sailaja is a 52-year-old woman with a history of a GYN cancer.  She noted a perianal lump and had a fissure with some bleeding.  She underwent an MRI on 03/04/2022 to better evaluate this and was found to have a roughly 1.5 cm fluid collection with surrounding inflammation on the right anterior, which ended in a sinus tract anteriorly and appeared to connect to the anal canal in the midline, although this was not conclusive.  She also was found to have a complex cystic fluid collection at the vaginal cuff.  She has been worked up by her gynecologist and had a biopsy of the vaginal lesion yesterday; results are pending.  She notes that after an IR biopsy, the perianal lesion appeared smaller.  She in general is having regular daily bowel movements that are formed.  No fecal incontinence.    We discussed the plan for an exam under anesthesia, likely drainage of this abscess and possible seton.  We reviewed the risks of bleeding, infection, alteration of bowel control, possible need for further procedures, and possible biopsy if needed.  She understood and wished to proceed.    FINDINGS:  On external exam, there was a roughly 2 cm area of firmness and induration.  Within the anal canal, there was a sentinel pile and a fissure.  In the mid portion of this, which was mostly epithelialized, there was what appeared to be an internal opening, and there was some bloody, purulent fluid draining from here.  A fistula probe was guided in through the internal opening, and it came out through the sphincter complex into that cavity laterally.  A seton was placed.  This tract appeared to  encompass at least a third of the anterior sphincter complex.    DESCRIPTION OF PROCEDURE:  After informed consent was obtained, the patient was taken to the operating room, positioned on the operating table in the prone jackknife position.  Perianal and vaginal regions were taped, prepped and draped in the usual fashion.  After appropriate timeout, gentle palpation revealed a 2 cm area of firmness that was in the right anterior ischiorectal fossa.  There was no overlying erythema or sinuses.  I injected 20 mL of lidocaine and 20 mL of Marcaine into both sites for a total of 40 mL of local.  A digital rectal exam did not reveal any stenosis, although there was a hypertrophied anal papilla on the anterior midline.  A Lema bivalve was inserted, and circumferential evaluation revealed a prominent divot in the anterior, suggestive of an internal opening.  Gentle pressure revealed purulent material with some bloody changes from that internal opening.  No other abnormalities were noticed within the anal canal.  A bi-digital exam did not reveal any evidence of thickening of the rectovaginal septum, although there was a palpable irregularity at the vaginal cuff.  On the examining glove, there was some brownish material.  It was unclear whether this was related to coagulants placed after biopsy yesterday.  This lesion the right anterior did not appear to be in conjunction with any of the abnormality within the vagina.  A fistula probe was guided in through the internal opening, which easily dropped into the cavity, consistent with the area of irregularity on the right anterior.  A roughly 1 cm incision was made over this and dropped into the cavity, where the fistula probe was.  A silk tie was secured to the fistula probe and withdrawn through the fistula tract and secured.  The cavity itself was then irrigated out with copious hydrogen peroxide.  Inspection within the vagina as well as within the anal canal did not reveal  any other abnormalities.  Point cautery was used to control bleeding that was minimal.  Gentle palpation within the cavity as well as within the anal canal did not reveal any evidence of other undrained sepsis, and the cavity wall appeared firm but not like a mass or other lesion.  A red vessel loop was then drawn through and secured to itself in 4 places.  An additional 10 mL of Marcaine was instilled around the incision site.  We inspected for hemostasis, which was excellent.  A repeat vaginal and rectal exam did not show any other abnormalities.    A dry gauze dressing was placed externally.  She was [facility] the [facility] and taken to recovery in stable condition.    COUNTS:  Sponge and needle counts were correct at the end of the procedure.    ESTIMATED BLOOD LOSS:  Less than 10 mL.    SPECIMENS:  None.    Pham Winn MD        D: 2022   T: 2022   MT: ALAN    Name:     YAN ORLANDO  MRN:      [id number]        Account:        [id number]   :      1970           Procedure Date: 2022     Document: [id number]    cc:  MD Suellen Rouse PA-C Britt K. Erickson, MD

## 2022-04-22 NOTE — TELEPHONE ENCOUNTER
So just to verify it sounds like her Oncology team knows about this and they are assessing it.       BELLO EsquedaC

## 2022-04-22 NOTE — ANESTHESIA CARE TRANSFER NOTE
Patient: Sailaja Mcgee    Procedure: Procedure(s):  Examination under anesthesia, seton placement, abcess drainage       Diagnosis: Anal fistula [K60.3]  Diagnosis Additional Information: No value filed.    Anesthesia Type:   MAC     Note:    Oropharynx: oropharynx clear of all foreign objects and spontaneously breathing  Level of Consciousness: drowsy  Oxygen Supplementation: nasal cannula  Level of Supplemental Oxygen (L/min / FiO2): 2  Independent Airway: airway patency satisfactory and stable  Dentition: dentition unchanged  Vital Signs Stable: post-procedure vital signs reviewed and stable  Report to RN Given: handoff report given  Patient transferred to: Phase II    Handoff Report: Identifed the Patient, Identified the Reponsible Provider, Reviewed the pertinent medical history, Discussed the surgical course, Reviewed Intra-OP anesthesia mangement and issues during anesthesia, Set expectations for post-procedure period and Allowed opportunity for questions and acknowledgement of understanding      Vitals:  Vitals Value Taken Time   BP     Temp     Pulse     Resp     SpO2         Electronically Signed By: EREN Magdaleno CRNA  April 22, 2022  3:05 PM

## 2022-04-22 NOTE — ANESTHESIA PREPROCEDURE EVALUATION
Anesthesia Pre-Procedure Evaluation    Patient: Sailaja Mcgee   MRN: [id number] : 1970        Procedure : Procedure(s):  Examination under anesthesia, possible fistulotomy, possible abcess drainage  possible seton          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      Past Surgical History:   Procedure Laterality Date     COLONOSCOPY N/A 7/10/2019    Procedure: COLONOSCOPY;  Surgeon: Sheba Tenorio MD;  Location: [facility] OR     CYSTOSCOPY  10/24/2011    Procedure:CYSTOSCOPY; Surgeon:NAYELY MADRIGAL; Location:[facility] OR     HC BRAIN MAPPING, 1ST HOUR MD ATTENDANCE      ablation of seizure focus     HC REDUCTION OF LARGE BREAST       HYSTERECTOMY TOTAL ABD, [place] SALPINGO-OOPHORECTOMY, NODE DISSECTION, TUMOR DEBULKING, COMBINED Bilateral 7/15/2019    Procedure: Exploratory Laparotomy, Total Abdominal Hysterectomy, Removal Of Both Tubes And Ovaries, Omentectomy, Appendectomy, Peritoneal Biopsies;  Surgeon: Ivy Soto MD;  Location: [facility] OR     HYSTERECTOMY VAGINAL  10/24/2011    Procedure:HYSTERECTOMY VAGINAL; vaginal hysterectomy and cystoscopy; Surgeon:NAYELY MADRIGAL; Location:[facility] OR     HYSTEROSCOPY,ABLATION ENDOMETRIUM       ZZC LAP,DONOR KIDNEY REMOV,LIVING  2009    Left laparoscopic donor nephrectomy.  Donating to brother. [facility].      Allergies   Allergen Reactions     Aspirin Rash     Rash on face      Social History     Tobacco Use     Smoking status: Never Smoker     Smokeless tobacco: Never Used     Tobacco comment: no smokers in the household   Substance Use Topics     Alcohol use: Yes     Comment: rarely      Wt Readings from Last 1 Encounters:   22 111.2 kg  (245 lb 1.6 oz)        Anesthesia Evaluation   Pt has had prior anesthetic. Type: General.    History of anesthetic complications  - difficult airway.  notes suggest not difficult.    ROS/MED HX  ENT/Pulmonary:     (+) SHEILA risk factors, obese,     Neurologic:  - neg neurologic ROS     Cardiovascular:  - neg cardiovascular ROS     METS/Exercise Tolerance:     Hematologic:  - neg hematologic  ROS     Musculoskeletal:  - neg musculoskeletal ROS     GI/Hepatic:     (+) GERD, Asymptomatic on medication,     Renal/Genitourinary:     (+) renal disease, type: CRI, Pt does not require dialysis,     Endo: Comment: Class 3 obesity    (+) Obesity,     Psychiatric/Substance Use:  - neg psychiatric ROS     Infectious Disease:  - neg infectious disease ROS     Malignancy:  - neg malignancy ROS     Other:  - neg other ROS          Physical Exam    Airway        Mallampati: III   TM distance: > 3 FB   Neck ROM: full   Mouth opening: > 3 cm    Respiratory Devices and Support         Dental  no notable dental history         Cardiovascular   cardiovascular exam normal       Rhythm and rate: regular and normal     Pulmonary   pulmonary exam normal        breath sounds clear to auscultation       Other findings: Lab Test        04/19/22 03/31/22 03/11/22 02/17/20 02/16/20                       1137          1230          1159          0540          1929          WBC          9.7           --          8.5          7.4          9.1           HGB          13.1          --          13.2         12.3         13.1          MCV          90            --          91           84           84            PLT          400           --          364          309          367           INR           --          1.00          --           --          1.07           Lab Test        04/19/22 03/11/22 02/21/20                       1137          1159          1315          NA           139          139          141            POTASSIUM    3.7          3.9          4.2           CHLORIDE     108          108          108           CO2          27           28           29            BUN          16           20           21            CR           1.07*        1.13*        1.13*         ANIONGAP     4            3            4             VINI          8.4*         8.8          8.6           GLC          100*         147*         98                   EKG Interpretation:   Sinus  Rhythm   Low voltage in precordial leads.    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    -  Nonspecific T-abnormality.     ABNORMAL     OUTSIDE LABS:  CBC:   Lab Results   Component Value Date    WBC 9.7 04/19/2022    WBC 8.5 03/11/2022    HGB 13.1 04/19/2022    HGB 13.2 03/11/2022    HCT 41.2 04/19/2022    HCT 41.3 03/11/2022     04/19/2022     03/11/2022     BMP:   Lab Results   Component Value Date     04/19/2022     03/11/2022    POTASSIUM 3.7 04/19/2022    POTASSIUM 3.9 03/11/2022    CHLORIDE 108 04/19/2022    CHLORIDE 108 03/11/2022    CO2 27 04/19/2022    CO2 28 03/11/2022    BUN 16 04/19/2022    BUN 20 03/11/2022    CR 1.07 (H) 04/19/2022    CR 1.13 (H) 03/11/2022     (H) 04/19/2022     (H) 03/11/2022     COAGS:   Lab Results   Component Value Date    PTT 32 02/16/2020    INR 1.00 03/31/2022     POC:   Lab Results   Component Value Date     (H) 02/16/2020    HCG Negative 10/24/2011     HEPATIC:   Lab Results   Component Value Date    ALBUMIN 3.2 (L) 03/11/2022    PROTTOTAL 7.8 03/11/2022    ALT 23 03/11/2022    AST 14 03/11/2022    ALKPHOS 107 03/11/2022    BILITOTAL 0.3 03/11/2022     OTHER:   Lab Results   Component Value Date    PH 7.39 07/15/2019    LACT 1.0 07/15/2019    VINI 8.4 (L) 04/19/2022    MAG 2.2 06/10/2019    LIPASE 521 (H) 04/27/2009    AMYLASE 126 (H) 04/27/2009    TSH 2.98 06/10/2019    .5 (H) 01/13/2014    SED 38 (H) 01/11/2010       Anesthesia Plan    ASA Status:  3      Anesthesia Type: MAC.     -  Reason for MAC: chronic cardiopulmonary disease, straight local not clinically adequate, immobility needed   Induction: Intravenous.   Maintenance: TIVA.        Consents    Anesthesia Plan(s) and associated risks, benefits, and realistic alternatives discussed. Questions answered and patient/representative(s) expressed understanding.    - Discussed:     - Discussed with:  Patient      - Extended Intubation/Ventilatory Support Discussed: No.      - Patient is DNR/DNI Status: No    Use of blood products discussed: No .     Postoperative Care    Pain management: IV analgesics, Oral pain medications, Peripheral nerve block (Single Shot), Multi-modal analgesia.   PONV prophylaxis: Ondansetron (or other 5HT-3), Background Propofol Infusion     Comments:                Colt Grace MD

## 2022-05-02 ENCOUNTER — OFFICE VISIT (OUTPATIENT)
Dept: ORTHOPEDICS | Facility: CLINIC | Age: 52
End: 2022-05-02
Payer: COMMERCIAL

## 2022-05-02 VITALS
HEIGHT: 65 IN | SYSTOLIC BLOOD PRESSURE: 145 MMHG | BODY MASS INDEX: 40.82 KG/M2 | RESPIRATION RATE: 22 BRPM | WEIGHT: 245 LBS | HEART RATE: 83 BPM | DIASTOLIC BLOOD PRESSURE: 87 MMHG

## 2022-05-02 DIAGNOSIS — R20.0 NUMBNESS AND TINGLING IN LEFT ARM: Primary | ICD-10-CM

## 2022-05-02 DIAGNOSIS — R20.2 NUMBNESS AND TINGLING IN LEFT ARM: Primary | ICD-10-CM

## 2022-05-02 PROCEDURE — 99213 OFFICE O/P EST LOW 20 MIN: CPT | Performed by: ORTHOPAEDIC SURGERY

## 2022-05-02 NOTE — LETTER
5/2/2022         RE: Sailaja Mcgee  [address] [facility] [address]        Dear Colleague,    Thank you for referring your patient, Sailaja Mcgee, to the [facility]. Please see a copy of my visit note below.    Sailaja Mcgee is a 51 year old female who is seen in consultation at the request of Suellen Millard PA-C for complaint of numbness of bilateral hand, especially with use of the hand and at night. Left worse than right.  Pain none, but drops objects..    She has had symptoms for 2 years.    Small finger is involved.  Prior treatment used: Wrist splint - no.  NSAID: - no, allergic to aspirin.    Employment: unknown. This is not work related.      PAST MEDICAL HISTORY:  Diabetes mellitus negative, hypothyroid negative.    Left arm EMG was performed on 4/6/2022.  All nerves evaluated were normal.  There was increased difference in peak latency between the left median and ulnar nerve possibly indicating early median nerve dysfunction.  Nevertheless all nerves fell in the normal range.  She has tried using night splints that we provided, but feels it makes numbness and pain worse.  She feels the numbness in the small finger and over the dorsal aspect of the hand and forearm.  Thus her symptoms at night are not at all median nerve.    Past Medical History:   Diagnosis Date     CKD (chronic kidney disease)     Stage 3     Difficult intubation 07/15/2019     Gastroesophageal reflux disease      History of blood transfusion      Ovarian tumor of borderline malignancy, left      Seizure (H) 2002    corrected by surgical procedure     Solitary kidney 04/2009    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       Past Surgical History:   Procedure Laterality Date     COLONOSCOPY N/A 7/10/2019    Procedure: COLONOSCOPY;  Surgeon: Sheba Tenorio MD;  [facility]     CYSTOSCOPY  10/24/2011    Procedure:CYSTOSCOPY; Surgeon:NAYELY MADRIGAL; Location:[facility]     EXAM UNDER ANESTHESIA, FISTULOTOMY RECTUM,  COMBINED N/A 4/22/2022    Procedure: Exam under anesthesia, drainage of abscess and placement of seton;  Surgeon: Pham Winn MD;  Location: [facility]     EXAM UNDER ANESTHESIA, FISTULOTOMY RECTUM, COMBINED  4/22/2022    Procedure: ;  Surgeon: Pham Winn MD;  Location: [facility]     HC BRAIN MAPPING, 1ST HOUR MD ATTENDANCE  2001    ablation of seizure focus     HC REDUCTION OF LARGE BREAST  2001     HYSTERECTOMY TOTAL ABD, [place] SALPINGO-OOPHORECTOMY, NODE DISSECTION, TUMOR DEBULKING, COMBINED Bilateral 7/15/2019    Procedure: Exploratory Laparotomy, Total Abdominal Hysterectomy, Removal Of Both Tubes And Ovaries, Omentectomy, Appendectomy, Peritoneal Biopsies;  Surgeon: Ivy Soto MD;  Location: [facility] OR     HYSTERECTOMY VAGINAL  10/24/2011    Procedure:HYSTERECTOMY VAGINAL; vaginal hysterectomy and cystoscopy; Surgeon:NAYELY MADRIGAL; [facility]     HYSTEROSCOPY,ABLATION ENDOMETRIUM  2001     ZZC LAP,DONOR KIDNEY REMOV,LIVING  4/2/2009    Left laparoscopic donor nephrectomy.  Donating to brother. [facility].       Family History   Problem Relation Age of Onset     Cancer Paternal Grandfather         colon cancer     Diabetes Maternal Grandmother      Cerebrovascular Disease Maternal Grandmother      Arthritis Maternal Grandmother      Arthritis Mother      Cardiovascular Maternal Grandfather         heart attack x2     Heart Disease Maternal Grandfather         heart attack x2     Gastrointestinal Disease Paternal Grandmother         liver failure     Genitourinary Problems Brother         kidney failure     Cancer Father         lung cancer diagnosed 7/11 due to chemical exposure war     Cancer Maternal Aunt      Asthma No family hx of      C.A.D. No family hx of      Hypertension No family hx of      Breast Cancer No family hx of      Cancer - colorectal No family hx of      Prostate Cancer No family hx of      Alzheimer Disease No family hx of      Blood Disease No family hx of       Circulatory No family hx of      Eye Disorder No family hx of      Lipids No family hx of      Musculoskeletal Disorder No family hx of      Neurologic Disorder No family hx of      Respiratory No family hx of      Thyroid Disease No family hx of        Social History     Socioeconomic History     Marital status:      Spouse name: Not on file     Number of children: Not on file     Years of education: Not on file     Highest education level: Not on file   Occupational History     Not on file   Tobacco Use     Smoking status: Never Smoker     Smokeless tobacco: Never Used     Tobacco comment: no smokers in the household   Vaping Use     Vaping Use: Never used   Substance and Sexual Activity     Alcohol use: Yes     Comment: rarely     Drug use: No     Sexual activity: Yes     Partners: Male     Birth control/protection: Female Surgical   Other Topics Concern     Parent/sibling w/ CABG, MI or angioplasty before 65F 55M? Not Asked   Social History Narrative     Not on file     Social Determinants of Health     Financial Resource Strain: Not on file   Food Insecurity: Not on file   Transportation Needs: Not on file   Physical Activity: Not on file   Stress: Not on file   Social Connections: Not on file   Intimate Partner Violence: Not on file   Housing Stability: Not on file       Current Outpatient Medications   Medication Sig Dispense Refill     acetaminophen (TYLENOL) 325 MG tablet Take 2 tablets (650 mg) by mouth every 6 hours 12 tablet 0     Bioflavonoid Products (VITAMIN C) CHEW Take 1 tablet by mouth daily       metroNIDAZOLE (METROGEL) 0.75 % external gel Apply topically 2 times daily (Patient not taking: Reported on 4/21/2022) 45 g 0     mvw complete formulation (CHEWABLES) tablet Take 1 tablet by mouth daily       omeprazole (PRILOSEC) 10 MG DR capsule Take 40 mg by mouth every morning        oxyCODONE (ROXICODONE) 5 MG tablet Take 1 tablet (5 mg) by mouth every 4 hours as needed for moderate to severe  "pain 6 tablet 0     UNABLE TO FIND Patient doesn't know name of medication but uses a cream for buttocks (Patient not taking: Reported on 4/21/2022)         Allergies   Allergen Reactions     Aspirin Rash     Rash on face       REVIEW OF SYSTEMS:  CONSTITUTIONAL:  NEGATIVE for fever, chills, change in weight, not feeling tired  SKIN:  NEGATIVE for worrisome rashes, no skin lumps, no skin ulcers and no non-healing wounds  EYES:  NEGATIVE for vision changes or irritation.  ENT/MOUTH:  NEGATIVE.  No hearing loss, no hoarseness, no difficulty swallowing.  RESP:  NEGATIVE. No cough or shortness of breath.  BREAST:  NEGATIVE for masses, tenderness or discharge  CV:  NEGATIVE for chest pain, palpitations or peripheral edema  GI:  NEGATIVE for nausea, abdominal pain, heartburn, or change in bowel habits  :  Negative. No dysuria, no hematuria  MUSCULOSKELETAL:  See HPI above  NEURO:  NEGATIVE . No headaches, no dizziness,  no numbness  ENDOCRINE:  NEGATIVE for temperature intolerance, skin/hair changes  HEME/ALLERGY/IMMUNE:  NEGATIVE for bleeding problems  PSYCHIATRIC:  NEGATIVE. no anxiety, no depression.          O: She appears well,   Vitals: BP (!) 145/87   Pulse 83   Resp 22   Ht 1.645 m (5' 4.75\")   Wt 111.1 kg (245 lb)   LMP 09/19/2011   BMI 41.09 kg/m    BMI= Body mass index is 41.09 kg/m .   Cervical spine has pain with range of motion.  She reports this pain sometimes goes into the arms.  Full range of motion of shoulder, elbows, wrists and fingers.  Hand exam revealed     Right: reduced sensation to all fingers, + Tinel's sign, + weakness of thumb/pinky pincer grasp, negative Phalen's maneuver, no thenar atrophy  Left: reduced sensation to all fingers, + Phalen's maneuver, + Tinel's sign, + weakness of thumb/pinky pincer grasp, no thenar atrophy   strength: Right normal, Left decreased.  Also positive Tinel at ulnar nerve at left elbow.      A: Unusual array of symptoms in the hands with numbness " universally through the forearm and hands.  EMG is entirely normal except for increased difference of latency between the median and ulnar nerve.  She has increased symptoms wearing a splint at night but not in a median nerve distribution.    P: At this point I really have no suggestion for controlling pain of the continuing splints and possibly use of anti-inflammatories.  She reports she is allergic to aspirin but does tolerate ibuprofen.  She will increase the ibuprofen.  Consider repeat EMG in 6 months.      Again, thank you for allowing me to participate in the care of your patient.        Sincerely,        Judah Barber MD

## 2022-05-02 NOTE — PATIENT INSTRUCTIONS
Sailaja to follow up with Primary Care provider regarding elevated blood pressure.    EMG is mostly normal.  The median nerve was slower than others, but still in normal range.  This does not explain your symptoms.  Continue splint.  Use more ibuprofen (up to 2400 mg / day)  Repeat EMG in 6 months if still having symptoms,

## 2022-05-03 ENCOUNTER — LAB REQUISITION (OUTPATIENT)
Dept: LAB | Facility: CLINIC | Age: 52
End: 2022-05-03
Payer: COMMERCIAL

## 2022-05-03 PROCEDURE — 88342 IMHCHEM/IMCYTCHM 1ST ANTB: CPT | Mod: TC,ORL | Performed by: PATHOLOGY

## 2022-05-03 NOTE — PROGRESS NOTES
Sailaja Mcgee is a 51 year old female who is seen in consultation at the request of Suellen Millard PA-C for complaint of numbness of bilateral hand, especially with use of the hand and at night. Left worse than right.  Pain none, but drops objects..    She has had symptoms for 2 years.    Small finger is involved.  Prior treatment used: Wrist splint - no.  NSAID: - no, allergic to aspirin.    Employment: unknown. This is not work related.      PAST MEDICAL HISTORY:  Diabetes mellitus negative, hypothyroid negative.    Left arm EMG was performed on 4/6/2022.  All nerves evaluated were normal.  There was increased difference in peak latency between the left median and ulnar nerve possibly indicating early median nerve dysfunction.  Nevertheless all nerves fell in the normal range.  She has tried using night splints that we provided, but feels it makes numbness and pain worse.  She feels the numbness in the small finger and over the dorsal aspect of the hand and forearm.  Thus her symptoms at night are not at all median nerve.    Past Medical History:   Diagnosis Date     CKD (chronic kidney disease)     Stage 3     Difficult intubation 07/15/2019     Gastroesophageal reflux disease      History of blood transfusion      Ovarian tumor of borderline malignancy, left      Seizure (H) 2002    corrected by surgical procedure     Solitary kidney 04/2009    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       Past Surgical History:   Procedure Laterality Date     COLONOSCOPY N/A 7/10/2019    Procedure: COLONOSCOPY;  Surgeon: Sheba Tenorio MD;  [facility]     CYSTOSCOPY  10/24/2011    Procedure:CYSTOSCOPY; Surgeon:NAYELY MADRIGAL; Location[facility]     EXAM UNDER ANESTHESIA, FISTULOTOMY RECTUM, COMBINED N/A 4/22/2022    Procedure: Exam under anesthesia, drainage of abscess and placement of seton;  Surgeon: Pham Winn MD;  Location: [facility]     EXAM UNDER ANESTHESIA, FISTULOTOMY RECTUM, COMBINED  4/22/2022    Procedure: ;   Surgeon: Pham Winn MD;  Location: [facility]     HC BRAIN MAPPING, 1ST HOUR MD ATTENDANCE  2001    ablation of seizure focus     HC REDUCTION OF LARGE BREAST  2001     HYSTERECTOMY TOTAL ABD, [place] SALPINGO-OOPHORECTOMY, NODE DISSECTION, TUMOR DEBULKING, COMBINED Bilateral 7/15/2019    Procedure: Exploratory Laparotomy, Total Abdominal Hysterectomy, Removal Of Both Tubes And Ovaries, Omentectomy, Appendectomy, Peritoneal Biopsies;  Surgeon: Ivy Soto MD;  Location: [facility]     HYSTERECTOMY VAGINAL  10/24/2011    Procedure:HYSTERECTOMY VAGINAL; vaginal hysterectomy and cystoscopy; Surgeon:NAYELY MADRIGAL; [facility]     HYSTEROSCOPY,ABLATION ENDOMETRIUM  2001     ZZC LAP,DONOR KIDNEY REMOV,LIVING  4/2/2009    Left laparoscopic donor nephrectomy.  Donating to brother. [facility].       Family History   Problem Relation Age of Onset     Cancer Paternal Grandfather         colon cancer     Diabetes Maternal Grandmother      Cerebrovascular Disease Maternal Grandmother      Arthritis Maternal Grandmother      Arthritis Mother      Cardiovascular Maternal Grandfather         heart attack x2     Heart Disease Maternal Grandfather         heart attack x2     Gastrointestinal Disease Paternal Grandmother         liver failure     Genitourinary Problems Brother         kidney failure     Cancer Father         lung cancer diagnosed 7/11 due to chemical exposure war     Cancer Maternal Aunt      Asthma No family hx of      C.A.D. No family hx of      Hypertension No family hx of      Breast Cancer No family hx of      Cancer - colorectal No family hx of      Prostate Cancer No family hx of      Alzheimer Disease No family hx of      Blood Disease No family hx of      Circulatory No family hx of      Eye Disorder No family hx of      Lipids No family hx of      Musculoskeletal Disorder No family hx of      Neurologic Disorder No family hx of      Respiratory No family hx of      Thyroid Disease No family hx of         Social History     Socioeconomic History     Marital status:      Spouse name: Not on file     Number of children: Not on file     Years of education: Not on file     Highest education level: Not on file   Occupational History     Not on file   Tobacco Use     Smoking status: Never Smoker     Smokeless tobacco: Never Used     Tobacco comment: no smokers in the household   Vaping Use     Vaping Use: Never used   Substance and Sexual Activity     Alcohol use: Yes     Comment: rarely     Drug use: No     Sexual activity: Yes     Partners: Male     Birth control/protection: Female Surgical   Other Topics Concern     Parent/sibling w/ CABG, MI or angioplasty before 65F 55M? Not Asked   Social History Narrative     Not on file     Social Determinants of Health     Financial Resource Strain: Not on file   Food Insecurity: Not on file   Transportation Needs: Not on file   Physical Activity: Not on file   Stress: Not on file   Social Connections: Not on file   Intimate Partner Violence: Not on file   Housing Stability: Not on file       Current Outpatient Medications   Medication Sig Dispense Refill     acetaminophen (TYLENOL) 325 MG tablet Take 2 tablets (650 mg) by mouth every 6 hours 12 tablet 0     Bioflavonoid Products (VITAMIN C) CHEW Take 1 tablet by mouth daily       metroNIDAZOLE (METROGEL) 0.75 % external gel Apply topically 2 times daily (Patient not taking: Reported on 4/21/2022) 45 g 0     mvw complete formulation (CHEWABLES) tablet Take 1 tablet by mouth daily       omeprazole (PRILOSEC) 10 MG DR capsule Take 40 mg by mouth every morning        oxyCODONE (ROXICODONE) 5 MG tablet Take 1 tablet (5 mg) by mouth every 4 hours as needed for moderate to severe pain 6 tablet 0     UNABLE TO FIND Patient doesn't know name of medication but uses a cream for buttocks (Patient not taking: Reported on 4/21/2022)         Allergies   Allergen Reactions     Aspirin Rash     Rash on face       REVIEW OF  "SYSTEMS:  CONSTITUTIONAL:  NEGATIVE for fever, chills, change in weight, not feeling tired  SKIN:  NEGATIVE for worrisome rashes, no skin lumps, no skin ulcers and no non-healing wounds  EYES:  NEGATIVE for vision changes or irritation.  ENT/MOUTH:  NEGATIVE.  No hearing loss, no hoarseness, no difficulty swallowing.  RESP:  NEGATIVE. No cough or shortness of breath.  BREAST:  NEGATIVE for masses, tenderness or discharge  CV:  NEGATIVE for chest pain, palpitations or peripheral edema  GI:  NEGATIVE for nausea, abdominal pain, heartburn, or change in bowel habits  :  Negative. No dysuria, no hematuria  MUSCULOSKELETAL:  See HPI above  NEURO:  NEGATIVE . No headaches, no dizziness,  no numbness  ENDOCRINE:  NEGATIVE for temperature intolerance, skin/hair changes  HEME/ALLERGY/IMMUNE:  NEGATIVE for bleeding problems  PSYCHIATRIC:  NEGATIVE. no anxiety, no depression.          O: She appears well,   Vitals: BP (!) 145/87   Pulse 83   Resp 22   Ht 1.645 m (5' 4.75\")   Wt 111.1 kg (245 lb)   LMP 09/19/2011   BMI 41.09 kg/m    BMI= Body mass index is 41.09 kg/m .   Cervical spine has pain with range of motion.  She reports this pain sometimes goes into the arms.  Full range of motion of shoulder, elbows, wrists and fingers.  Hand exam revealed     Right: reduced sensation to all fingers, + Tinel's sign, + weakness of thumb/pinky pincer grasp, negative Phalen's maneuver, no thenar atrophy  Left: reduced sensation to all fingers, + Phalen's maneuver, + Tinel's sign, + weakness of thumb/pinky pincer grasp, no thenar atrophy   strength: Right normal, Left decreased.  Also positive Tinel at ulnar nerve at left elbow.      A: Unusual array of symptoms in the hands with numbness universally through the forearm and hands.  EMG is entirely normal except for increased difference of latency between the median and ulnar nerve.  She has increased symptoms wearing a splint at night but not in a median nerve " distribution.    P: At this point I really have no suggestion for controlling pain of the continuing splints and possibly use of anti-inflammatories.  She reports she is allergic to aspirin but does tolerate ibuprofen.  She will increase the ibuprofen.  Consider repeat EMG in 6 months.

## 2022-05-05 ENCOUNTER — ONCOLOGY VISIT (OUTPATIENT)
Dept: ONCOLOGY | Facility: CLINIC | Age: 52
End: 2022-05-05
Attending: OBSTETRICS & GYNECOLOGY
Payer: COMMERCIAL

## 2022-05-05 VITALS
OXYGEN SATURATION: 100 % | HEART RATE: 66 BPM | WEIGHT: 249.7 LBS | SYSTOLIC BLOOD PRESSURE: 138 MMHG | DIASTOLIC BLOOD PRESSURE: 88 MMHG | BODY MASS INDEX: 41.87 KG/M2 | TEMPERATURE: 97.6 F

## 2022-05-05 DIAGNOSIS — D39.12 OVARIAN TUMOR OF BORDERLINE MALIGNANCY, LEFT: ICD-10-CM

## 2022-05-05 DIAGNOSIS — N89.8 VAGINAL MASS: Primary | ICD-10-CM

## 2022-05-05 PROCEDURE — 57100 BIOPSY VAGINAL MUCOSA SIMPLE: CPT | Performed by: OBSTETRICS & GYNECOLOGY

## 2022-05-05 PROCEDURE — G0463 HOSPITAL OUTPT CLINIC VISIT: HCPCS | Mod: 25

## 2022-05-05 PROCEDURE — 88305 TISSUE EXAM BY PATHOLOGIST: CPT | Mod: 26 | Performed by: PATHOLOGY

## 2022-05-05 PROCEDURE — 88305 TISSUE EXAM BY PATHOLOGIST: CPT | Mod: TC | Performed by: OBSTETRICS & GYNECOLOGY

## 2022-05-05 PROCEDURE — 99213 OFFICE O/P EST LOW 20 MIN: CPT | Mod: 25 | Performed by: OBSTETRICS & GYNECOLOGY

## 2022-05-05 PROCEDURE — G0463 HOSPITAL OUTPT CLINIC VISIT: HCPCS

## 2022-05-05 PROCEDURE — 57100 BIOPSY VAGINAL MUCOSA SIMPLE: CPT

## 2022-05-05 RX ORDER — CEFAZOLIN SODIUM 2 G/50ML
2 SOLUTION INTRAVENOUS SEE ADMIN INSTRUCTIONS
Status: CANCELLED | OUTPATIENT
Start: 2022-05-05

## 2022-05-05 RX ORDER — CEFAZOLIN SODIUM 2 G/50ML
2 SOLUTION INTRAVENOUS
Status: CANCELLED | OUTPATIENT
Start: 2022-05-05

## 2022-05-05 ASSESSMENT — PAIN SCALES - GENERAL: PAINLEVEL: NO PAIN (0)

## 2022-05-05 NOTE — PROGRESS NOTES
"GYN Oncology Follow Up Visit    Referring provider:    Bert Recinos MD  [address] [place] [address]   RE: Sailaja Mcgee  : 1970  ROGER: 2022       CC: pelvic mass and vaginal mass, history of borderline mucinous tumor.     HPI: Ms Sailaja Mcgee is a 52 year old year old female who presents for follow up of her borderline mucinous tumor and new symtpoms.       Sailaja had recent surgery for an anal fistula and has a drain in place. She continues to have mild vaginal bleeding and discharge and also some urinary urgency. She \"wants this thing out\". Vaginal biopsy by me 2 weeks ago was non diagnostic but did show mucinous epithelium (no obvious atypica)       Treatment history:   19: MRI of back for back pain  19: CT scan showing large pelvic mass. Ca125 282, CEA 6.9,   7/15/19: XLap/BSO/Appy/Omx/Biopsies/CL. 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 Significant adhesive disease (previous left nephrectomy)  3/4/2022: Pelvic MRI (for rectal symptoms)   3/11/2022: CT C/A/P with complex cystic solid mass of low hemipelvis 5.4cm x 3.5cm. Stable lymphadenopathy.   3/31/22: IR guided biopsy, benign (scant tissue)   2022: Pelvic exam (Gyn Onc - Dr Ivy Soto) with vaginal polyp. Pathology shows mucinous cells, no atypia.     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       Past Surgical History:   Procedure Laterality Date     COLONOSCOPY N/A 7/10/2019    Procedure: COLONOSCOPY;  Surgeon: Sheba Tenorio MD;  [facility]     CYSTOSCOPY  10/24/2011    Procedure:CYSTOSCOPY; Surgeon:NAYELY MADRIGAL; Location: OR     EXAM UNDER " ANESTHESIA, FISTULOTOMY RECTUM, COMBINED N/A 2022    Procedure: Exam under anesthesia, drainage of abscess and placement of seton;  Surgeon: Pham Winn MD;  Location: [facility] OR     EXAM UNDER ANESTHESIA, FISTULOTOMY RECTUM, COMBINED  2022    Procedure: ;  Surgeon: Pham Winn MD;  Location: [facility] OR     HC BRAIN MAPPING, 1ST HOUR MD ATTENDANCE      ablation of seizure focus     HC REDUCTION OF LARGE BREAST       HYSTERECTOMY TOTAL ABD, [place] SALPINGO-OOPHORECTOMY, NODE DISSECTION, TUMOR DEBULKING, COMBINED Bilateral 7/15/2019    Procedure: Exploratory Laparotomy, Total Abdominal Hysterectomy, Removal Of Both Tubes And Ovaries, Omentectomy, Appendectomy, Peritoneal Biopsies;  Surgeon: Ivy Soto MD;  Location: [facility] OR     HYSTERECTOMY VAGINAL  10/24/2011    Procedure:HYSTERECTOMY VAGINAL; vaginal hysterectomy and cystoscopy; Surgeon:NAYELY MADRIGAL; Location:[facility]     HYSTEROSCOPY,ABLATION ENDOMETRIUM       ZZC LAP,DONOR KIDNEY REMOV,LIVING  2009    Left laparoscopic donor nephrectomy.  Donating to brother. [facility].        OBGYN history and Health Maintenance:    Last Pap Smear: , NILM prior to hysterectomy  Last Mammogram:never  Last Colonoscopy: 2019 at [facility] prior to surgery, normal    Current Outpatient Medications   Medication Sig Dispense Refill     acetaminophen (TYLENOL) 325 MG tablet Take 2 tablets (650 mg) by mouth every 6 hours 12 tablet 0     Bioflavonoid Products (VITAMIN C) CHEW Take 1 tablet by mouth daily       mvw complete formulation (CHEWABLES) tablet Take 1 tablet by mouth daily       omeprazole (PRILOSEC) 10 MG DR capsule Take 40 mg by mouth every morning        oxyCODONE (ROXICODONE) 5 MG tablet Take 1 tablet (5 mg) by mouth every 4 hours as needed for moderate to severe pain 6 tablet 0            Allergies   Allergen Reactions     Aspirin Rash     Rash on face        Social History:  Social History     Tobacco Use     Smoking status: Never  Smoker     Smokeless tobacco: Never Used     Tobacco comment: no smokers in the household   Substance Use Topics     Alcohol use: Yes     Comment: rarely     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 Linear speech, appropriate affect  Pulmonary:  Normal respiratory effort, CTA  CV: RRR  GI: Large vertical midline incision well healed. No obvious hernia. Obese.    Lymph: No enlarge lymph nodes in neck or groin  :NEFG some dark vaginal drainage. On speculum exam she has a 2-3 cm upper vagina polyp/mass. Bright red. It is at the cuff and very fixed to the cuff. Repeat Biopsy obtained today. On BME I can feel this upper mass. It is fixed to the upper vagina and difficult to tell the extent of the pelvic mass. I suspect the tumor is growing through the cuff from the peritoneum.   Perianal catheter in place and sutured in.     Labs/imaging (Personally reviewed,  4/21/2022)    CT 3/11/2022:   IMPRESSION:  1.  Complex cystic and mixed cystic and solid mass low hemipelvis,  slightly to the left of midline, which measures up to 5.4 cm AP x 3.5  cm transverse x 3.6 cm SI. This approximates the posterior wall of the  urinary bladder; it is difficult to determine whether it invades the  urinary bladder. It is highly suspect for recurrence of ovarian  neoplasm.   2.  Stable pattern of subcentimeter retroperitoneal and  extraperitoneal lymph nodes. No new lymphadenopathy.  3.  Left  nephrectomy.  4.  Atherosclerosis.  5.  Hepatic steatosis.  6.  Sequela of remote granulomatous infection.  7.  Small hiatal hernia.     [Access Center: Complex pelvic mass concerning for ovarian neoplasm  recurrence]     This report will be copied to the [facility] to ensure a  provider acknowledges the finding. Access Center is available Monday  through Friday 8am-3:30 pm.      ANICETO KAPOOR MD     MRI 3/4/22 (summarized outside read)  Fluid edema and inflammation of low intersphincteric space, presumabley represents the reported fistula although no communication with the anal canal is seen. (see full report scanned in). Also complex cyst tethered to superior aspect of the vaginal cuff and displays mass effect on the posterior bladder    Pathology 4/21/22: (Personally reviewed today,  5/6/2022)      VAGINA, MASS, BIOPSY:  - Benign endocervical mucosa with inflammatory/reactive changes  - Negative for atypia or malignancy         Assessment:    Sailaja Mcgee is a 52 year old woman with a history of stage 1C3 borderline mucinous ovarian neoplasm now with a pelvic and vaginal mass . I discussed her case with Dr Staley in pathology       Plan:     1.)   Stage 1C borderline mucinous ovarian tumor: Vaginal polyp pathology was inconclusive (no obvious recurrence, but mucin producing cells seen).Reviewed at length with pathology.   is normal. MRI and CT with lower pelvic mass concerning for recurrence (versus adhesive disease/inclusion cyst).  She also recently had anal fistula surgery.   This mass is highly suspicious for recurrent malignancy/borderline. SHe is symptomatic. I feel surgical excision is warranted. Surgery will be potentially complex given her adhesive disease. Would need a radical upper vaginectomy. High risk for damage to bladder and ureters. Discussed surgical risks not limited to bleeding, infection, damage to surrounding organs, need for blood transfusions and ICU stay. I  would like to start robotically for better lower pelvic visualization. About a 25-50% risk of needing to convert to laparotomy.     -Followup vaginal pathology  - Plan robotic assisted radical upper vaginectomy with cystoscopy    2.) Genetic risk factors were assessed: referral not indicated at this time    3.) Anal fistula: healing well from surgery    32 minutes total time today including reviewing labs, imaging, exam, documentation, counseling, orders. Biopsy took 4 minutes total time.       Ivy Soto MD    Department of Ob/Gyn and Women's Health  Division of Gynecologic Oncology  [facility]  [phone]

## 2022-05-05 NOTE — LETTER
"  2022        RE: Sailaja Mcgee  [address]  [facility] [address]      Dear Colleague,    Thank you for referring your patient, Sailaja Mcgee, to the [facility]. Please see a copy of my visit note below.    GYN Oncology Follow Up Visit    Referring provider:    Bert Recinos MD  [address] [place] [address]   RE: Sailaja Mcgee  : 1970  ROGER: 2022       CC: pelvic mass and vaginal mass, history of borderline mucinous tumor.     HPI: Ms Sailaja Mcgee is a 52 year old year old female who presents for follow up of her borderline mucinous tumor and new symtpoms.       Sailaja had recent surgery for an anal fistula and has a drain in place. She continues to have mild vaginal bleeding and discharge and also some urinary urgency. She \"wants this thing out\". Vaginal biopsy by me 2 weeks ago was non diagnostic but did show mucinous epithelium (no obvious atypica)       Treatment history:   19: MRI of back for back pain  19: CT scan showing large pelvic mass. Ca125 282, CEA 6.9,   7/15/19: XLap/BSO/Appy/Omx/Biopsies/CL. 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 Significant adhesive disease (previous left nephrectomy)  3/4/2022: Pelvic MRI (for rectal symptoms)   3/11/2022: CT C/A/P with complex cystic solid mass of low hemipelvis 5.4cm x 3.5cm. Stable lymphadenopathy.   3/31/22: IR guided biopsy, benign (scant tissue)   2022: Pelvic exam (Gyn Onc - Dr Ivy Soto) with vaginal polyp. Pathology shows mucinous cells, no atypia.     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       Past Surgical History:   Procedure " Laterality Date     COLONOSCOPY N/A 7/10/2019    Procedure: COLONOSCOPY;  Surgeon: Sheba Tenorio MD;  [facility]     CYSTOSCOPY  10/24/2011    Procedure:CYSTOSCOPY; Surgeon:NAYELY MADRIGAL; Location[facility]     EXAM UNDER ANESTHESIA, FISTULOTOMY RECTUM, COMBINED N/A 2022    Procedure: Exam under anesthesia, drainage of abscess and placement of seton;  Surgeon: Pham Winn MD;  Location: [facility] OR     EXAM UNDER ANESTHESIA, FISTULOTOMY RECTUM, COMBINED  2022    Procedure: ;  Surgeon: Pham Winn MD;  Location: [facility] OR     HC BRAIN MAPPING, 1ST HOUR MD ATTENDANCE      ablation of seizure focus     HC REDUCTION OF LARGE BREAST       HYSTERECTOMY TOTAL ABD, [place] SALPINGO-OOPHORECTOMY, NODE DISSECTION, TUMOR DEBULKING, COMBINED Bilateral 7/15/2019    Procedure: Exploratory Laparotomy, Total Abdominal Hysterectomy, Removal Of Both Tubes And Ovaries, Omentectomy, Appendectomy, Peritoneal Biopsies;  Surgeon: Ivy Soto MD;  Location: [facility]     HYSTERECTOMY VAGINAL  10/24/2011    Procedure:HYSTERECTOMY VAGINAL; vaginal hysterectomy and cystoscopy; Surgeon:NAYELY MADRIGAL; Location[facility]     HYSTEROSCOPY,ABLATION ENDOMETRIUM       ZZC LAP,DONOR KIDNEY REMOV,LIVING  2009    Left laparoscopic donor nephrectomy.  Donating to brother. [facility] [place].        OBGYN history and Health Maintenance:    Last Pap Smear: , NILM prior to hysterectomy  Last Mammogram:never  Last Colonoscopy: 2019 at [facility] prior to surgery, normal    Current Outpatient Medications   Medication Sig Dispense Refill     acetaminophen (TYLENOL) 325 MG tablet Take 2 tablets (650 mg) by mouth every 6 hours 12 tablet 0     Bioflavonoid Products (VITAMIN C) CHEW Take 1 tablet by mouth daily       mvw complete formulation (CHEWABLES) tablet Take 1 tablet by mouth daily       omeprazole (PRILOSEC) 10 MG DR capsule Take 40 mg by mouth every morning        oxyCODONE (ROXICODONE) 5 MG tablet Take 1 tablet (5  mg) by mouth every 4 hours as needed for moderate to severe pain 6 tablet 0            Allergies   Allergen Reactions     Aspirin Rash     Rash on face        Social History:  Social History     Tobacco Use     Smoking status: Never Smoker     Smokeless tobacco: Never Used     Tobacco comment: no smokers in the household   Substance Use Topics     Alcohol use: Yes     Comment: rarely     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 Linear speech, appropriate affect  Pulmonary:  Normal respiratory effort, CTA  CV: RRR  GI: Large vertical midline incision well healed. No obvious hernia. Obese.    Lymph: No enlarge lymph nodes in neck or groin  :NEFG some dark vaginal drainage. On speculum exam she has a 2-3 cm upper vagina polyp/mass. Bright red. It is at the cuff and very fixed to the cuff. Repeat Biopsy obtained today. On BME I can feel this upper mass. It is fixed to the upper vagina and difficult to tell the extent of the pelvic mass. I suspect the tumor is growing through the cuff from the peritoneum.   Perianal catheter in place and sutured in.     Labs/imaging (Personally reviewed,  4/21/2022)    CT 3/11/2022:   IMPRESSION:  1.  Complex cystic and mixed cystic and solid mass low hemipelvis,  slightly to the left of midline, which measures up to 5.4 cm AP x 3.5  cm transverse x 3.6 cm SI. This approximates the posterior wall of the  urinary bladder; it is difficult to  determine whether it invades the  urinary bladder. It is highly suspect for recurrence of ovarian  neoplasm.   2.  Stable pattern of subcentimeter retroperitoneal and  extraperitoneal lymph nodes. No new lymphadenopathy.  3.  Left nephrectomy.  4.  Atherosclerosis.  5.  Hepatic steatosis.  6.  Sequela of remote granulomatous infection.  7.  Small hiatal hernia.     [Access Center: Complex pelvic mass concerning for ovarian neoplasm  recurrence]     This report will be copied to the [facility] to ensure a  provider acknowledges the finding. Access Center is available Monday  through Friday 8am-3:30 pm.      ANICETO KAPOOR MD     MRI 3/4/22 (summarized outside read)  Fluid edema and inflammation of low intersphincteric space, presumabley represents the reported fistula although no communication with the anal canal is seen. (see full report scanned in). Also complex cyst tethered to superior aspect of the vaginal cuff and displays mass effect on the posterior bladder    Pathology 4/21/22: (Personally reviewed today,  5/6/2022)      VAGINA, MASS, BIOPSY:  - Benign endocervical mucosa with inflammatory/reactive changes  - Negative for atypia or malignancy         Assessment:    Sailaja Mcgee is a 52 year old woman with a history of stage 1C3 borderline mucinous ovarian neoplasm now with a pelvic and vaginal mass . I discussed her case with Dr Staley in pathology       Plan:     1.)   Stage 1C borderline mucinous ovarian tumor: Vaginal polyp pathology was inconclusive (no obvious recurrence, but mucin producing cells seen).Reviewed at length with pathology.   is normal. MRI and CT with lower pelvic mass concerning for recurrence (versus adhesive disease/inclusion cyst).  She also recently had anal fistula surgery.   This mass is highly suspicious for recurrent malignancy/borderline. SHe is symptomatic. I feel surgical excision is warranted. Surgery will be potentially complex given her adhesive  disease. Would need a radical upper vaginectomy. High risk for damage to bladder and ureters. Discussed surgical risks not limited to bleeding, infection, damage to surrounding organs, need for blood transfusions and ICU stay. I would like to start robotically for better lower pelvic visualization. About a 25-50% risk of needing to convert to laparotomy.     -Followup vaginal pathology  - Plan robotic assisted radical upper vaginectomy with cystoscopy    2.) Genetic risk factors were assessed: referral not indicated at this time    3.) Anal fistula: healing well from surgery    32 minutes total time today including reviewing labs, imaging, exam, documentation, counseling, orders. Biopsy took 4 minutes total time.       Ivy Soto MD    Department of Ob/Gyn and Women's Health  Division of Gynecologic Oncology  [facility]  [phone]

## 2022-05-05 NOTE — NURSING NOTE
"Oncology Rooming Note    May 5, 2022 4:13 PM   Sailaja Mcgee is a 52 year old female who presents for:    Chief Complaint   Patient presents with     Oncology Clinic Visit     Ovarian tumor of borderline     Initial Vitals: /88   Pulse 66   Temp 97.6  F (36.4  C) (Oral)   Wt 113.3 kg (249 lb 11.2 oz)   LMP 09/19/2011   SpO2 100%   BMI 41.87 kg/m   Estimated body mass index is 41.87 kg/m  as calculated from the following:    Height as of 5/2/22: 1.645 m (5' 4.75\").    Weight as of this encounter: 113.3 kg (249 lb 11.2 oz). Body surface area is 2.28 meters squared.  No Pain (0) Comment: Data Unavailable   Patient's last menstrual period was 09/19/2011.  Allergies reviewed: Yes  Medications reviewed: Yes    Medications: Medication refills not needed today.  Pharmacy name entered into [facility]: [facility] [address] [place] [id number] [facility]    Clinical concerns: None      Cindy Thorne"

## 2022-05-05 NOTE — NURSING NOTE
Prior to the start of the procedure and with procedural staff participation, I verbally confirmed the patient s identity using two indicators, relevant allergies, that the procedure was appropriate and matched the consent or emergent situation, and that the correct equipment/implants were available. Immediately prior to starting the procedure I conducted the Time Out with the procedural staff and re-confirmed the patient s name, procedure, and site/side. (The Joint Commission universal protocol was followed.)  Yes    Sedation (Moderate or Deep): None    Post procedure pain: 0    COMPLETED  Vaginal cuff Bx  Vaginal introitus Bx    What to expect after biopsy reviewed     Maritza Brasher RN

## 2022-05-07 DIAGNOSIS — Z11.59 ENCOUNTER FOR SCREENING FOR OTHER VIRAL DISEASES: Primary | ICD-10-CM

## 2022-05-09 ENCOUNTER — TELEPHONE (OUTPATIENT)
Dept: ONCOLOGY | Facility: CLINIC | Age: 52
End: 2022-05-09
Payer: COMMERCIAL

## 2022-05-09 NOTE — TELEPHONE ENCOUNTER
RN Care Coordinator: Jayde Brasher; [phone]    Sur[facility] is [facility]  Scheduled per orders.    H&P to be completed by Surgeon     [facility]    Post-op: 6/9, in person visit    Patient will receive a phone call from pre-admission nurses 1-2 days prior to surgery with arrival and start time.    Patient will complete COVID-19 test that was scheduled by surgical coordinator 2-4 days prior to surgery.     I spoke with the patient and was able to confirm the scheduled information. No further action needed at this time.

## 2022-05-10 LAB
PATH REPORT.COMMENTS IMP SPEC: NORMAL
PATH REPORT.FINAL DX SPEC: NORMAL
PATH REPORT.FINAL DX SPEC: NORMAL
PATH REPORT.GROSS SPEC: NORMAL
PATH REPORT.GROSS SPEC: NORMAL
PATH REPORT.MICROSCOPIC SPEC OTHER STN: NORMAL
PATH REPORT.RELEVANT HX SPEC: NORMAL
PATH REPORT.RELEVANT HX SPEC: NORMAL
PHOTO IMAGE: NORMAL
PHOTO IMAGE: NORMAL

## 2022-05-10 PROCEDURE — 88342 IMHCHEM/IMCYTCHM 1ST ANTB: CPT | Mod: 26 | Performed by: PATHOLOGY

## 2022-05-10 PROCEDURE — 88341 IMHCHEM/IMCYTCHM EA ADD ANTB: CPT | Mod: 26 | Performed by: PATHOLOGY

## 2022-05-10 PROCEDURE — 88342 IMHCHEM/IMCYTCHM 1ST ANTB: CPT | Mod: TC,XU | Performed by: OBSTETRICS & GYNECOLOGY

## 2022-05-10 PROCEDURE — 88360 TUMOR IMMUNOHISTOCHEM/MANUAL: CPT | Mod: 26 | Performed by: PATHOLOGY

## 2022-05-13 ENCOUNTER — PATIENT OUTREACH (OUTPATIENT)
Dept: ONCOLOGY | Facility: CLINIC | Age: 52
End: 2022-05-13

## 2022-05-13 ENCOUNTER — LAB (OUTPATIENT)
Dept: LAB | Facility: CLINIC | Age: 52
End: 2022-05-13
Attending: OBSTETRICS & GYNECOLOGY
Payer: COMMERCIAL

## 2022-05-13 DIAGNOSIS — Z11.59 ENCOUNTER FOR SCREENING FOR OTHER VIRAL DISEASES: ICD-10-CM

## 2022-05-13 PROCEDURE — U0003 INFECTIOUS AGENT DETECTION BY NUCLEIC ACID (DNA OR RNA); SEVERE ACUTE RESPIRATORY SYNDROME CORONAVIRUS 2 (SARS-COV-2) (CORONAVIRUS DISEASE [COVID-19]), AMPLIFIED PROBE TECHNIQUE, MAKING USE OF HIGH THROUGHPUT TECHNOLOGIES AS DESCRIBED BY CMS-2020-01-R: HCPCS

## 2022-05-13 PROCEDURE — U0005 INFEC AGEN DETEC AMPLI PROBE: HCPCS

## 2022-05-13 NOTE — TELEPHONE ENCOUNTER
RN attempted to reach patient to answer questions about upcoming surgery and complete pre operative teaching.     RN was unable to reach patient     RN will attempt to reach patient via Cindy Brasher RN

## 2022-05-13 NOTE — TELEPHONE ENCOUNTER
Patients Cindy disabled. RN unable to send message or reach patient via telephone     Maritza Brasher RN

## 2022-05-14 ENCOUNTER — ANESTHESIA EVENT (OUTPATIENT)
Dept: SURGERY | Facility: CLINIC | Age: 52
End: 2022-05-14
Payer: COMMERCIAL

## 2022-05-14 LAB — SARS-COV-2 RNA RESP QL NAA+PROBE: NEGATIVE

## 2022-05-15 ASSESSMENT — ENCOUNTER SYMPTOMS: SEIZURES: 1

## 2022-05-15 NOTE — H&P
[facility]   GYN Oncology  Pre-op History and Physical    Sailaja Mcgee MRN# [id number]   Age: 52 year old YOB: 1970     Date of Procedure:  22     Primary care provider: Suellen Millard    HPI:  Sailaja Mcgee is a 52 year old  with PMH of Stage 1C borderline mucinous ovarian tumor who presents for scheduled RA-radical upper vaginectomy, cystoscopy, possible open with Dr. Soto for new pelvic mass.     She is feeling well today and denies recent illness, F/C, loss of taste/smell, cough, sore throat, chest pain, shortness of breath, nausea/vomiting. Her health has not changed since her last visit with Dr. Soto on 22 and her prior pre-op evaluation on 22 for anal fistula repair. Her recent COVID test was negative.      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       PSH:  Past Surgical History:   Procedure Laterality Date     COLONOSCOPY N/A 7/10/2019    Procedure: COLONOSCOPY;  Surgeon: Sheba Tenorio MD;  [facility]     CYSTOSCOPY  10/24/2011    Procedure:CYSTOSCOPY; Surgeon:NAYELY MADRIGAL; Location:[facility] OR     EXAM UNDER ANESTHESIA, FISTULOTOMY RECTUM, COMBINED N/A 2022    Procedure: Exam under anesthesia, drainage of abscess and placement of seton;  Surgeon: Pham Winn MD;  Location: [facility]     EXAM UNDER ANESTHESIA, FISTULOTOMY RECTUM, COMBINED  2022    Procedure: ;  Surgeon: Pham Winn MD;  Location: [facility] OR     HC BRAIN MAPPING, 1ST HOUR MD ATTENDANCE      ablation of seizure focus     HC REDUCTION OF LARGE BREAST       HYSTERECTOMY TOTAL ABD, [place] SALPINGO-OOPHORECTOMY, NODE DISSECTION, TUMOR  DEBULKING, COMBINED Bilateral 7/15/2019    Procedure: Exploratory Laparotomy, Total Abdominal Hysterectomy, Removal Of Both Tubes And Ovaries, Omentectomy, Appendectomy, Peritoneal Biopsies;  Surgeon: Ivy Soto MD;  Location: [facility] OR     HYSTERECTOMY VAGINAL  10/24/2011    Procedure:HYSTERECTOMY VAGINAL; vaginal hysterectomy and cystoscopy; Surgeon:NAYELY MADRIGAL; Location[facility]     HYSTEROSCOPY,ABLATION ENDOMETRIUM  2001     ZZC LAP,DONOR KIDNEY REMOV,LIVING  4/2/2009    Left laparoscopic donor nephrectomy.  Donating to brother. [facility].       Medications:  No current facility-administered medications on file prior to encounter.  acetaminophen (TYLENOL) 325 MG tablet, Take 2 tablets (650 mg) by mouth every 6 hours  Bioflavonoid Products (VITAMIN C) CHEW, Take 1 tablet by mouth daily  mvw complete formulation (CHEWABLES) tablet, Take 1 tablet by mouth daily  omeprazole (PRILOSEC) 10 MG DR capsule, Take 40 mg by mouth every morning   oxyCODONE (ROXICODONE) 5 MG tablet, Take 1 tablet (5 mg) by mouth every 4 hours as needed for moderate to severe pain        Social History:  Social History     Tobacco Use     Smoking status: Never Smoker     Smokeless tobacco: Never Used     Tobacco comment: no smokers in the household   Vaping Use     Vaping Use: Never used   Substance Use Topics     Alcohol use: Yes     Comment: rarely     Drug use: No       Family History:  Family History   Problem Relation Age of Onset     Cancer Paternal Grandfather         colon cancer     Diabetes Maternal Grandmother      Cerebrovascular Disease Maternal Grandmother      Arthritis Maternal Grandmother      Arthritis Mother      Cardiovascular Maternal Grandfather         heart attack x2     Heart Disease Maternal Grandfather         heart attack x2     Gastrointestinal Disease Paternal Grandmother         liver failure     Genitourinary Problems Brother         kidney failure     Cancer Father         lung cancer diagnosed 7/11 due  to chemical exposure war     Cancer Maternal Aunt      Asthma No family hx of      C.A.D. No family hx of      Hypertension No family hx of      Breast Cancer No family hx of      Cancer - colorectal No family hx of      Prostate Cancer No family hx of      Alzheimer Disease No family hx of      Blood Disease No family hx of      Circulatory No family hx of      Eye Disorder No family hx of      Lipids No family hx of      Musculoskeletal Disorder No family hx of      Neurologic Disorder No family hx of      Respiratory No family hx of      Thyroid Disease No family hx of        Allergies:  Allergies   Allergen Reactions     Aspirin Rash     Rash on face       Physical Exam    LMP 2011     Gen: NAD, sitting in bed  CV: RRR, no m/r/g  Resp: NWOB, CTAB  Abd: Soft, nontender, nondistended  Ext: Warm, well perfused, trace edema  Neuro: Moving all 4 extremeities    Labs:  CBC, T&S pending  : Cr 1.07, K 3.7, Hgb 13.1, Plt 400    Studies:   EKG: sinus, old ant infarct, nonspec T wave abnl    Assessment/Plan:  Sailaja Mcgee is a 52 year old  with PMH of Stage 1C borderline mucinous ovarian tumor who presents for scheduled RA-radical upper vaginectomy, cystoscopy, possible open with Dr. Soto for new pelvic mass. Her health is unchanged from recent visit with Dr. Soto on 22.      Plan to proceed with procedure as scheduled. Informed consent signed and all questions answered. This is a same day procedure and patient will be discharged to home following recovery from anesthesia unless laparotomy is required.     Discussed with Dr. Soto.    Mike Hobson MD MPH  OB/Gyn PGY-3  22 6:20 AM

## 2022-05-16 ENCOUNTER — HOSPITAL ENCOUNTER (OUTPATIENT)
Facility: CLINIC | Age: 52
Discharge: HOME | End: 2022-05-16
Attending: OBSTETRICS & GYNECOLOGY | Admitting: OBSTETRICS & GYNECOLOGY
Payer: COMMERCIAL

## 2022-05-16 ENCOUNTER — ANESTHESIA (OUTPATIENT)
Dept: SURGERY | Facility: CLINIC | Age: 52
End: 2022-05-16
Payer: COMMERCIAL

## 2022-05-16 VITALS
HEIGHT: 65 IN | RESPIRATION RATE: 16 BRPM | DIASTOLIC BLOOD PRESSURE: 72 MMHG | OXYGEN SATURATION: 94 % | HEART RATE: 72 BPM | TEMPERATURE: 97.7 F | SYSTOLIC BLOOD PRESSURE: 125 MMHG | BODY MASS INDEX: 41.07 KG/M2 | WEIGHT: 246.47 LBS

## 2022-05-16 DIAGNOSIS — D39.12 OVARIAN TUMOR OF BORDERLINE MALIGNANCY, LEFT: Primary | ICD-10-CM

## 2022-05-16 LAB
ABO/RH(D): NORMAL
ANTIBODY SCREEN: NEGATIVE
BASOPHILS # BLD AUTO: 0 10E3/UL (ref 0–0.2)
BASOPHILS NFR BLD AUTO: 0 %
EOSINOPHIL # BLD AUTO: 0.2 10E3/UL (ref 0–0.7)
EOSINOPHIL NFR BLD AUTO: 2 %
ERYTHROCYTE [DISTWIDTH] IN BLOOD BY AUTOMATED COUNT: 13.5 % (ref 10–15)
GLUCOSE BLDC GLUCOMTR-MCNC: 117 MG/DL (ref 70–99)
HCT VFR BLD AUTO: 39.4 % (ref 35–47)
HGB BLD-MCNC: 12.5 G/DL (ref 11.7–15.7)
IMM GRANULOCYTES # BLD: 0 10E3/UL
IMM GRANULOCYTES NFR BLD: 0 %
LYMPHOCYTES # BLD AUTO: 2 10E3/UL (ref 0.8–5.3)
LYMPHOCYTES NFR BLD AUTO: 27 %
MCH RBC QN AUTO: 28.5 PG (ref 26.5–33)
MCHC RBC AUTO-ENTMCNC: 31.7 G/DL (ref 31.5–36.5)
MCV RBC AUTO: 90 FL (ref 78–100)
MONOCYTES # BLD AUTO: 0.4 10E3/UL (ref 0–1.3)
MONOCYTES NFR BLD AUTO: 6 %
NEUTROPHILS # BLD AUTO: 4.8 10E3/UL (ref 1.6–8.3)
NEUTROPHILS NFR BLD AUTO: 65 %
NRBC # BLD AUTO: 0 10E3/UL
NRBC BLD AUTO-RTO: 0 /100
PLATELET # BLD AUTO: 343 10E3/UL (ref 150–450)
RBC # BLD AUTO: 4.38 10E6/UL (ref 3.8–5.2)
SPECIMEN EXPIRATION DATE: NORMAL
WBC # BLD AUTO: 7.4 10E3/UL (ref 4–11)

## 2022-05-16 PROCEDURE — 88309 TISSUE EXAM BY PATHOLOGIST: CPT | Mod: 26 | Performed by: PATHOLOGY

## 2022-05-16 PROCEDURE — 258N000003 HC RX IP 258 OP 636

## 2022-05-16 PROCEDURE — 370N000017 HC ANESTHESIA TECHNICAL FEE, PER MIN: Performed by: OBSTETRICS & GYNECOLOGY

## 2022-05-16 PROCEDURE — 250N000013 HC RX MED GY IP 250 OP 250 PS 637: Performed by: STUDENT IN AN ORGANIZED HEALTH CARE EDUCATION/TRAINING PROGRAM

## 2022-05-16 PROCEDURE — 88331 PATH CONSLTJ SURG 1 BLK 1SPC: CPT | Mod: 26 | Performed by: PATHOLOGY

## 2022-05-16 PROCEDURE — 272N000001 HC OR GENERAL SUPPLY STERILE: Performed by: OBSTETRICS & GYNECOLOGY

## 2022-05-16 PROCEDURE — 710N000010 HC RECOVERY PHASE 1, LEVEL 2, PER MIN: Performed by: OBSTETRICS & GYNECOLOGY

## 2022-05-16 PROCEDURE — 250N000009 HC RX 250: Performed by: NURSE ANESTHETIST, CERTIFIED REGISTERED

## 2022-05-16 PROCEDURE — 86901 BLOOD TYPING SEROLOGIC RH(D): CPT | Performed by: OBSTETRICS & GYNECOLOGY

## 2022-05-16 PROCEDURE — 250N000011 HC RX IP 250 OP 636

## 2022-05-16 PROCEDURE — 250N000013 HC RX MED GY IP 250 OP 250 PS 637

## 2022-05-16 PROCEDURE — 250N000011 HC RX IP 250 OP 636: Performed by: OBSTETRICS & GYNECOLOGY

## 2022-05-16 PROCEDURE — 85025 COMPLETE CBC W/AUTO DIFF WBC: CPT | Performed by: OBSTETRICS & GYNECOLOGY

## 2022-05-16 PROCEDURE — 36415 COLL VENOUS BLD VENIPUNCTURE: CPT | Performed by: OBSTETRICS & GYNECOLOGY

## 2022-05-16 PROCEDURE — 710N000012 HC RECOVERY PHASE 2, PER MINUTE: Performed by: OBSTETRICS & GYNECOLOGY

## 2022-05-16 PROCEDURE — 360N000080 HC SURGERY LEVEL 7, PER MIN: Performed by: OBSTETRICS & GYNECOLOGY

## 2022-05-16 PROCEDURE — 88331 PATH CONSLTJ SURG 1 BLK 1SPC: CPT | Mod: TC | Performed by: OBSTETRICS & GYNECOLOGY

## 2022-05-16 PROCEDURE — 88305 TISSUE EXAM BY PATHOLOGIST: CPT | Mod: 26 | Performed by: PATHOLOGY

## 2022-05-16 PROCEDURE — 999N000141 HC STATISTIC PRE-PROCEDURE NURSING ASSESSMENT: Performed by: OBSTETRICS & GYNECOLOGY

## 2022-05-16 PROCEDURE — 250N000025 HC SEVOFLURANE, PER MIN: Performed by: OBSTETRICS & GYNECOLOGY

## 2022-05-16 PROCEDURE — 250N000011 HC RX IP 250 OP 636: Performed by: ANESTHESIOLOGY

## 2022-05-16 PROCEDURE — 250N000011 HC RX IP 250 OP 636: Performed by: NURSE ANESTHETIST, CERTIFIED REGISTERED

## 2022-05-16 PROCEDURE — 250N000012 HC RX MED GY IP 250 OP 636 PS 637: Performed by: ANESTHESIOLOGY

## 2022-05-16 RX ORDER — OXYCODONE HYDROCHLORIDE 5 MG/1
5 TABLET ORAL EVERY 4 HOURS PRN
Status: DISCONTINUED | OUTPATIENT
Start: 2022-05-16 | End: 2022-05-16 | Stop reason: HOSPADM

## 2022-05-16 RX ORDER — FENTANYL CITRATE 50 UG/ML
INJECTION, SOLUTION INTRAMUSCULAR; INTRAVENOUS PRN
Status: DISCONTINUED | OUTPATIENT
Start: 2022-05-16 | End: 2022-05-16

## 2022-05-16 RX ORDER — PROPOFOL 10 MG/ML
INJECTION, EMULSION INTRAVENOUS PRN
Status: DISCONTINUED | OUTPATIENT
Start: 2022-05-16 | End: 2022-05-16

## 2022-05-16 RX ORDER — NITROFURANTOIN 25; 75 MG/1; MG/1
100 CAPSULE ORAL DAILY
Qty: 14 CAPSULE | Refills: 0 | Status: SHIPPED | OUTPATIENT
Start: 2022-05-16 | End: 2022-12-14

## 2022-05-16 RX ORDER — LIDOCAINE HYDROCHLORIDE 20 MG/ML
INJECTION, SOLUTION INFILTRATION; PERINEURAL PRN
Status: DISCONTINUED | OUTPATIENT
Start: 2022-05-16 | End: 2022-05-16

## 2022-05-16 RX ORDER — LIDOCAINE 40 MG/G
CREAM TOPICAL
Status: DISCONTINUED | OUTPATIENT
Start: 2022-05-16 | End: 2022-05-16 | Stop reason: HOSPADM

## 2022-05-16 RX ORDER — OXYCODONE HYDROCHLORIDE 5 MG/1
5 TABLET ORAL EVERY 6 HOURS PRN
Qty: 6 TABLET | Refills: 0 | Status: SHIPPED | OUTPATIENT
Start: 2022-05-16 | End: 2022-12-14

## 2022-05-16 RX ORDER — CEFAZOLIN SODIUM/WATER 2 G/20 ML
2 SYRINGE (ML) INTRAVENOUS
Status: COMPLETED | OUTPATIENT
Start: 2022-05-16 | End: 2022-05-16

## 2022-05-16 RX ORDER — OXYCODONE HYDROCHLORIDE 5 MG/1
5 TABLET ORAL
Status: COMPLETED | OUTPATIENT
Start: 2022-05-16 | End: 2022-05-16

## 2022-05-16 RX ORDER — ACETAMINOPHEN 325 MG/1
975 TABLET ORAL ONCE
Status: COMPLETED | OUTPATIENT
Start: 2022-05-16 | End: 2022-05-16

## 2022-05-16 RX ORDER — SODIUM CHLORIDE, SODIUM LACTATE, POTASSIUM CHLORIDE, CALCIUM CHLORIDE 600; 310; 30; 20 MG/100ML; MG/100ML; MG/100ML; MG/100ML
INJECTION, SOLUTION INTRAVENOUS CONTINUOUS
Status: DISCONTINUED | OUTPATIENT
Start: 2022-05-16 | End: 2022-05-16 | Stop reason: HOSPADM

## 2022-05-16 RX ORDER — DEXAMETHASONE SODIUM PHOSPHATE 4 MG/ML
INJECTION, SOLUTION INTRA-ARTICULAR; INTRALESIONAL; INTRAMUSCULAR; INTRAVENOUS; SOFT TISSUE PRN
Status: DISCONTINUED | OUTPATIENT
Start: 2022-05-16 | End: 2022-05-16

## 2022-05-16 RX ORDER — ONDANSETRON 4 MG/1
4 TABLET, ORALLY DISINTEGRATING ORAL EVERY 30 MIN PRN
Status: DISCONTINUED | OUTPATIENT
Start: 2022-05-16 | End: 2022-05-16 | Stop reason: HOSPADM

## 2022-05-16 RX ORDER — GLYCOPYRROLATE 0.2 MG/ML
INJECTION, SOLUTION INTRAMUSCULAR; INTRAVENOUS PRN
Status: DISCONTINUED | OUTPATIENT
Start: 2022-05-16 | End: 2022-05-16

## 2022-05-16 RX ORDER — HYDROMORPHONE HYDROCHLORIDE 1 MG/ML
0.4 INJECTION, SOLUTION INTRAMUSCULAR; INTRAVENOUS; SUBCUTANEOUS EVERY 5 MIN PRN
Status: DISCONTINUED | OUTPATIENT
Start: 2022-05-16 | End: 2022-05-16 | Stop reason: HOSPADM

## 2022-05-16 RX ORDER — APREPITANT 40 MG/1
40 CAPSULE ORAL ONCE
Status: COMPLETED | OUTPATIENT
Start: 2022-05-16 | End: 2022-05-16

## 2022-05-16 RX ORDER — CEFAZOLIN SODIUM/WATER 2 G/20 ML
2 SYRINGE (ML) INTRAVENOUS SEE ADMIN INSTRUCTIONS
Status: DISCONTINUED | OUTPATIENT
Start: 2022-05-16 | End: 2022-05-16 | Stop reason: HOSPADM

## 2022-05-16 RX ORDER — AMOXICILLIN 250 MG
1-2 CAPSULE ORAL 2 TIMES DAILY
Qty: 30 TABLET | Refills: 0 | Status: SHIPPED | OUTPATIENT
Start: 2022-05-16 | End: 2022-12-27

## 2022-05-16 RX ORDER — ACETAMINOPHEN 325 MG/1
975 TABLET ORAL EVERY 6 HOURS PRN
Qty: 50 TABLET | Refills: 0 | Status: SHIPPED | OUTPATIENT
Start: 2022-05-16 | End: 2022-12-21

## 2022-05-16 RX ORDER — ONDANSETRON 2 MG/ML
4 INJECTION INTRAMUSCULAR; INTRAVENOUS EVERY 30 MIN PRN
Status: DISCONTINUED | OUTPATIENT
Start: 2022-05-16 | End: 2022-05-16 | Stop reason: HOSPADM

## 2022-05-16 RX ORDER — FENTANYL CITRATE 50 UG/ML
50 INJECTION, SOLUTION INTRAMUSCULAR; INTRAVENOUS EVERY 5 MIN PRN
Status: DISCONTINUED | OUTPATIENT
Start: 2022-05-16 | End: 2022-05-16 | Stop reason: HOSPADM

## 2022-05-16 RX ORDER — ONDANSETRON 2 MG/ML
INJECTION INTRAMUSCULAR; INTRAVENOUS PRN
Status: DISCONTINUED | OUTPATIENT
Start: 2022-05-16 | End: 2022-05-16

## 2022-05-16 RX ADMIN — ROCURONIUM BROMIDE 70 MG: 50 INJECTION, SOLUTION INTRAVENOUS at 07:36

## 2022-05-16 RX ADMIN — FENTANYL CITRATE 150 MCG: 50 INJECTION, SOLUTION INTRAMUSCULAR; INTRAVENOUS at 07:36

## 2022-05-16 RX ADMIN — OXYCODONE HYDROCHLORIDE 5 MG: 5 TABLET ORAL at 13:21

## 2022-05-16 RX ADMIN — LIDOCAINE HYDROCHLORIDE 100 MG: 20 INJECTION, SOLUTION INFILTRATION; PERINEURAL at 07:36

## 2022-05-16 RX ADMIN — ONDANSETRON 4 MG: 4 TABLET, ORALLY DISINTEGRATING ORAL at 13:49

## 2022-05-16 RX ADMIN — DEXAMETHASONE SODIUM PHOSPHATE 8 MG: 4 INJECTION, SOLUTION INTRA-ARTICULAR; INTRALESIONAL; INTRAMUSCULAR; INTRAVENOUS; SOFT TISSUE at 07:53

## 2022-05-16 RX ADMIN — APREPITANT 40 MG: 40 CAPSULE ORAL at 07:12

## 2022-05-16 RX ADMIN — ONDANSETRON 4 MG: 2 INJECTION INTRAMUSCULAR; INTRAVENOUS at 11:00

## 2022-05-16 RX ADMIN — SODIUM CHLORIDE, POTASSIUM CHLORIDE, SODIUM LACTATE AND CALCIUM CHLORIDE: 600; 310; 30; 20 INJECTION, SOLUTION INTRAVENOUS at 07:22

## 2022-05-16 RX ADMIN — ACETAMINOPHEN 975 MG: 325 TABLET, FILM COATED ORAL at 07:04

## 2022-05-16 RX ADMIN — SODIUM CHLORIDE, POTASSIUM CHLORIDE, SODIUM LACTATE AND CALCIUM CHLORIDE: 600; 310; 30; 20 INJECTION, SOLUTION INTRAVENOUS at 10:15

## 2022-05-16 RX ADMIN — ROCURONIUM BROMIDE 20 MG: 50 INJECTION, SOLUTION INTRAVENOUS at 08:13

## 2022-05-16 RX ADMIN — PROPOFOL 200 MG: 10 INJECTION, EMULSION INTRAVENOUS at 07:36

## 2022-05-16 RX ADMIN — HYDROMORPHONE HYDROCHLORIDE 0.5 MG: 1 INJECTION, SOLUTION INTRAMUSCULAR; INTRAVENOUS; SUBCUTANEOUS at 10:31

## 2022-05-16 RX ADMIN — ROCURONIUM BROMIDE 20 MG: 50 INJECTION, SOLUTION INTRAVENOUS at 10:10

## 2022-05-16 RX ADMIN — ACETAMINOPHEN 975 MG: 325 TABLET ORAL at 13:20

## 2022-05-16 RX ADMIN — MIDAZOLAM 2 MG: 1 INJECTION INTRAMUSCULAR; INTRAVENOUS at 07:22

## 2022-05-16 RX ADMIN — PROCHLORPERAZINE EDISYLATE 5 MG: 5 INJECTION INTRAMUSCULAR; INTRAVENOUS at 14:41

## 2022-05-16 RX ADMIN — GLYCOPYRROLATE 0.2 MG: 0.2 INJECTION, SOLUTION INTRAMUSCULAR; INTRAVENOUS at 07:36

## 2022-05-16 RX ADMIN — ROCURONIUM BROMIDE 20 MG: 50 INJECTION, SOLUTION INTRAVENOUS at 09:04

## 2022-05-16 RX ADMIN — PROPOFOL 25 MCG/KG/MIN: 10 INJECTION, EMULSION INTRAVENOUS at 08:08

## 2022-05-16 RX ADMIN — SUGAMMADEX 200 MG: 100 INJECTION, SOLUTION INTRAVENOUS at 11:10

## 2022-05-16 RX ADMIN — Medication 2 G: at 07:55

## 2022-05-16 ASSESSMENT — ACTIVITIES OF DAILY LIVING (ADL)
ADLS_ACUITY_SCORE: 22

## 2022-05-16 NOTE — OR NURSING
GYN resident notified that pt feeling better, ready to leave at 1625. VS stable. Ok to discharge per MD. Put in discharge order.

## 2022-05-16 NOTE — OP NOTE
Procedure Date: 05/16/2022    PREOPERATIVE DIAGNOSES:     1.  History of stage 1c borderline mucinous tumor of the left ovary with bilateral oophorectomy in 2019.  2.  Pelvic mass and vaginal mass concerning for possible recurrence.    POSTOPERATIVE DIAGNOSES:  1.  History of stage 1c borderline mucinous tumor of the left ovary with bilateral oophorectomy in 2019.  2.  Pelvic mass and vaginal mass concerning for possible recurrence.    PROCEDURES PERFORMED:    1.  Robotic-assisted radical upper vaginectomy.    2.  Robotic-assisted pelvic mass excision.   3.  Cystoscopy.    SURGEON:  Ivy Soto MD    ASSISTANTS:   1.  Dr. Chi Ling (GYN Oncology fellow).  2.  Dr. Mike Hobson (GYN Oncology fellow).    ANESTHESIA:  General.    ESTIMATED BLOOD LOSS:  20 mL    COMPLICATIONS:  None.    SPECIMENS REMOVED:     1.  Pelvic mass for frozen section.  2.  Sigmoid mesenteric mass for permanent.  3.  Upper vagina for permanent.    INDICATIONS FOR PROCEDURE:  Ms. Mcgee is a 52-year-old woman with a history of bilateral oophorectomy in 2019.  This was performed for a very large left sided pelvic mass which was found to be a stage 1C3 mucinous borderline tumor of the ovary.  On recent assessment in clinic, she was noted to have a vaginal mass concerning for possible recurrence.  Imaging also revealed a pelvic mass concerning for possible recurrence.  She therefore presented to the hospital for further surgical diagnosis and management.    FINDINGS:  On bimanual exam, she had normal external genitalia.  Her cervix was absent.  She had an approximately 3 x 3 cm polypoid mass concerning for malignancy in the upper vagina that was adherent to the cuff.  On diagnostic laparoscopy, she had a normal upper abdomen with some right upper quadrant adhesions.  She had an absent uterus, cervix and bilateral ovaries.  Her sigmoid colon was adherent to her left pelvic sidewall.  The pelvic mass was not initially obvious abdominally;  however, after lysing adhesions and exploring the left pelvic cul-de-sac, it was evident she had an approximately 3 x 4 cm tumor originating from the upper vaginal cuff, extending to the mesentery of her sigmoid colon without any obvious invasion of the colon.  It was also adherent to the posterior bladder peritoneum.  The mass was contiguous with the mass in the upper vagina.   Cystoscopy was notable for normal bladder mucosa with normal right sided ureteral jet. No left ureteral jet efflux was noted, consistent with previous left nephrectomy.     DESCRIPTION OF PROCEDURE:  After informed consent, the patient was brought to the operating room where general anesthesia was administered.  She was prepped and draped in the dorsal lithotomy position..  A surgical timeout was performed, ensuring correct patient and procedure.  She did get Ancef for preoperative prophylactic antibiotics.      I started with cystoscopy using the rigid 30 degrees cystoscope. See above findings. Her bladder was drained and a  Braswell catheter was placed.     Next, I placed a Veress needle through her umbilicus.  Opening pressure was high.  I therefore abandoned this technique and moved to the left upper quadrant.  An 8 mm robotic trocar was placed with the Visiport technique without complication. I then placed a midline robotic trocar, an additional left sided trocar, a right sided robotic trocar and then a right sided 8 mm accessory port.  The patient was placed in steep Trendelenburg and the above findings were noted.    I first started by performing lysis of adhesions of the sigmoid colon along the left pelvic sidewall.  I then further developed this place and restored her normal anatomy.  It was evident that she had a pelvic mass adherent to her upper vaginal cuff that was extruding through the corner of her left vaginal fornix.  I therefore made an incision over the top of the vaginal cuff in order to develop the bladder flap.  I then  developed the bladder flap.  A cystotomy was noted during this time, which was inherent to the procedure, given the location of the mass and the adhesions in this area.  This was approximately 1.5 cm defect in the dome of the bladder.  I continued to develop the bladder flap and dissect the bladder off the anterior vagina.  At this point, I entered the vagina intentionally and noted the vaginal mass that was again continuous with the left upper portion of the vagina.  I then removed this portion of the vagina.  I then performed a posterior dissection by dissecting a portion of the rectum off the posterior vagina and further  this mass from the sigmoid colon mesentery.  Ultimately, I was able to make a circumferential incision in the left upper vagina in order to detach the mass from its attachment to the vagina.  I continued to peel the mass off the left pelvic sidewall.  I did send a portion of this for frozen section which confirmed likely recurrence of her mucinous borderline tumor.  The mass was adherent to her sigmoid colon, I removed a portion of the mass that was adherent to the colon and placed it in the bag and removed this through the accessory port site.  Ultimately, the remainder of the mass was freed from the pelvic sidewall and was placed in a 5 mm bag and then removed through the vagina.  The pelvis was then copiously irrigated and everything was very hemostatic.  We closed the vagina with interrupted 0 Vicryl sutures.  We then reidentified the cystotomy, which was approximately 1.5cm in length and was located 3 cm from the vaginal cuff.  This was closed in 2 layers, first with a running 4-0 Vicryl suture and then with an imbricated 3-0 Vicryl suture.    I then performed  repeat cystoscopy following the mass excision..  I again saw the right ureteral orifice and good efflux was noted.  I could visualize the suture line, which appeared to be closed nicely, and no leakage was noted in the  pelvis.  At this point, I replaced the Braswell catheter with intention to keep it for approximately 10 days to facilitate further bladder healing.  The robot was undocked.  The pneumoperitoneum was evacuated.  The ports were removed and the port sites were closed with 4-0 Vicryl in a subcuticular fashion.  Steri-Strips were placed over the incision.  The patient was then awoken from anesthesia and brought to recovery room in stable condition.    Ivy Soto MD        D: 2022   T: 2022   MT: JAYE    Name:     YAN ORLANDO  MRN:      [id number]        Account:        [id number]   :      1970           Procedure Date: 2022     Document: [id number]

## 2022-05-16 NOTE — ANESTHESIA POSTPROCEDURE EVALUATION
Patient: Sailaja Mcgee    Procedure: Procedure(s):  Robotic assisted radical upper vaginectomy, pelvic mass excision,  cystoscopy,       Anesthesia Type:  General    Note:  Disposition: Inpatient   Postop Pain Control: Uneventful            Sign Out: Well controlled pain   PONV: No   Neuro/Psych: Uneventful            Sign Out: Acceptable/Baseline neuro status   Airway/Respiratory: Uneventful            Sign Out: Acceptable/Baseline resp. status   CV/Hemodynamics: Uneventful            Sign Out: Acceptable CV status; No obvious hypovolemia; No obvious fluid overload   Other NRE: NONE   DID A NON-ROUTINE EVENT OCCUR? No           Last vitals:  Vitals Value Taken Time   /86 05/16/22 1230   Temp 37  C (98.6  F) 05/16/22 1120   Pulse 97 05/16/22 1236   Resp 15 05/16/22 1236   SpO2 96 % 05/16/22 1236   Vitals shown include unvalidated device data.    Electronically Signed By: Jane Huffman MD  May 16, 2022  12:37 PM

## 2022-05-16 NOTE — OR NURSING
Gyn resident at bedside at 1530, wants the pt to stay and be monitored for one more hour, then will put in discharge order if pt is ok to discharge.

## 2022-05-16 NOTE — ANESTHESIA PREPROCEDURE EVALUATION
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      Past Surgical History:   Procedure Laterality Date     COLONOSCOPY N/A 7/10/2019    Procedure: COLONOSCOPY;  Surgeon: Sheba Tenorio MD;  [facility]     CYSTOSCOPY  10/24/2011    Procedure:CYSTOSCOPY; Surgeon:NAYELY MADRIGAL; Location:[facility]     EXAM UNDER ANESTHESIA, FISTULOTOMY RECTUM, COMBINED N/A 2022    Procedure: Exam under anesthesia, drainage of abscess and placement of seton;  Surgeon: Pham Winn MD;  Location: [facility]     EXAM UNDER ANESTHESIA, FISTULOTOMY RECTUM, COMBINED  2022    Procedure: ;  Surgeon: Pham Winn MD;  Location: [facility]     HC BRAIN MAPPING, 1ST HOUR MD ATTENDANCE      ablation of seizure focus     HC REDUCTION OF LARGE BREAST       HYSTERECTOMY TOTAL ABD, [place] SALPINGO-OOPHORECTOMY, NODE DISSECTION, TUMOR DEBULKING, COMBINED Bilateral 7/15/2019    Procedure: Exploratory Laparotomy, Total Abdominal Hysterectomy, Removal Of Both Tubes And Ovaries, Omentectomy, Appendectomy, Peritoneal Biopsies;  Surgeon: Ivy Soto MD;  Location: [facility] OR     HYSTERECTOMY VAGINAL  10/24/2011    Procedure:HYSTERECTOMY VAGINAL; vaginal hysterectomy and cystoscopy; Surgeon:NAYELY MADRIGAL; Location[facility]     HYSTEROSCOPY,ABLATION ENDOMETRIUM       ZZC LAP,DONOR KIDNEY REMOV,LIVING  2009    Left laparoscopic donor nephrectomy.  Donating to brother. [facility].       Allergies   Allergen Reactions     Aspirin Rash     Rash on face      Social History     Tobacco Use     Smoking status: Never Smoker     Smokeless tobacco: Never Used     Tobacco comment: no smokers in the household   Substance Use Topics     Alcohol use: Yes     Comment: rarely      Wt Readings from Last 1 Encounters:   05/05/22 113.3 kg (249 lb 11.2 oz)        Anesthesia Evaluation   Pt has had prior anesthetic. Type: General.    History of anesthetic complications  - difficult airway.  previous notes suggest not difficult but there were multiple attempts on DL.    ROS/MED HX  ENT/Pulmonary:     (+) SHEILA risk factors, obese,     Neurologic:     (+) seizures (no seizures for 20+ years), last seizure: 2002,     Cardiovascular:     (+) Dyslipidemia -----    METS/Exercise Tolerance:     Hematologic:     (+) history of blood transfusion,     Musculoskeletal:  - neg musculoskeletal ROS     GI/Hepatic:     (+) GERD, Asymptomatic on medication,     Renal/Genitourinary: Comment: Solitary kidney after donating a 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 in 2009    (+) renal disease, type: CRI, Pt does not require dialysis,     Endo: Comment: Class 3 obesity    (+) Obesity,     Psychiatric/Substance Use:  - neg psychiatric ROS     Infectious Disease:  - neg infectious disease ROS     Malignancy:   (+) Malignancy, History of Other.Other CA Ovarian, vaginal tumor status post.    Other: Comment: Borderline ovarian tumor for malignancy            Physical Exam    Airway        Mallampati: III   TM distance: > 3 FB   Neck ROM: full   Mouth opening: > 3 cm    Respiratory Devices and Support         Dental  no notable dental history         Cardiovascular   cardiovascular exam normal       Rhythm and rate: regular and normal     Pulmonary   pulmonary exam normal        breath sounds clear to auscultation       Other findings: Lab Test        04/19/22 03/31/22 03/11/22 02/17/20 02/16/20                       1137          1230           1159          0540          1929          WBC          9.7           --          8.5          7.4          9.1           HGB          13.1          --          13.2         12.3         13.1          MCV          90            --          91           84           84            PLT          400           --          364          309          367           INR           --          1.00          --           --          1.07           Lab Test        04/19/22 03/11/22 02/21/20                       1137          1159          1315          NA           139          139          141           POTASSIUM    3.7          3.9          4.2           CHLORIDE     108          108          108           CO2          27           28           29            BUN          16           20           21            CR           1.07*        1.13*        1.13*         ANIONGAP     4            3            4             VINI          8.4*         8.8          8.6           GLC          100*         147*         98                   EKG Interpretation:   Sinus  Rhythm   Low voltage in precordial leads.    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    -  Nonspecific T-abnormality.     ABNORMAL     OUTSIDE LABS:  CBC:   Lab Results   Component Value Date    WBC 9.7 04/19/2022    WBC 8.5 03/11/2022    HGB 13.1 04/19/2022    HGB 13.2 03/11/2022    HCT 41.2 04/19/2022    HCT 41.3 03/11/2022     04/19/2022     03/11/2022     BMP:   Lab Results   Component Value Date     04/19/2022     03/11/2022    POTASSIUM 3.7 04/19/2022    POTASSIUM 3.9 03/11/2022    CHLORIDE 108 04/19/2022    CHLORIDE 108 03/11/2022    CO2 27 04/19/2022    CO2 28 03/11/2022    BUN 16 04/19/2022    BUN 20 03/11/2022    CR 1.07 (H) 04/19/2022    CR 1.13 (H) 03/11/2022     (H) 04/19/2022     (H) 03/11/2022     COAGS:   Lab Results   Component Value Date    PTT 32 02/16/2020    INR 1.00 03/31/2022     POC:   Lab Results   Component Value Date      (H) 02/16/2020    HCG Negative 10/24/2011     HEPATIC:   Lab Results   Component Value Date    ALBUMIN 3.2 (L) 03/11/2022    PROTTOTAL 7.8 03/11/2022    ALT 23 03/11/2022    AST 14 03/11/2022    ALKPHOS 107 03/11/2022    BILITOTAL 0.3 03/11/2022     OTHER:   Lab Results   Component Value Date    PH 7.39 07/15/2019    LACT 1.0 07/15/2019    VINI 8.4 (L) 04/19/2022    MAG 2.2 06/10/2019    LIPASE 521 (H) 04/27/2009    AMYLASE 126 (H) 04/27/2009    TSH 2.98 06/10/2019    .5 (H) 01/13/2014    SED 38 (H) 01/11/2010       Anesthesia Plan    ASA Status:  3   NPO Status:  NPO Appropriate    Anesthesia Type: General.     - Airway: ETT   Induction: Intravenous, Propofol.   Maintenance: Balanced.   Techniques and Equipment:     - Airway: Video-Laryngoscope     - Lines/Monitors: 2nd IV     Consents    Anesthesia Plan(s) and associated risks, benefits, and realistic alternatives discussed. Questions answered and patient/representative(s) expressed understanding.    - Discussed:     - Discussed with:  Patient      - Extended Intubation/Ventilatory Support Discussed: Yes.      - Patient is DNR/DNI Status: No         Postoperative Care    Pain management: IV analgesics, Oral pain medications, Multi-modal analgesia.   PONV prophylaxis: Ondansetron (or other 5HT-3), Dexamethasone or Solumedrol, Background Propofol Infusion, Aprepitant     Comments:                    Shine Lobo

## 2022-05-16 NOTE — PROGRESS NOTES
"Gynecologic Oncology   Postoperative Check  5/16/2022    S:   Patient reports she is doing ok postoperatively. Pain is controlled with oral medications. Notes some post op lightheadedness/dizziness that is improving. Batres in place and draining well. Does feel some irritation/pressure like she needs to void. Was nauseated and dry heaving earlier but this has improved after antiemetics. Tolerating breanna grahams and some water without vomiting. Bleeding is minimal. Denies chest pain, shortness of breath, or other concerns at this time. Feels ready to discharge home    O:  Patient Vitals for the past 24 hrs:   BP Temp Temp src Pulse Resp SpO2 Height Weight   05/16/22 1625 127/68 97.7  F (36.5  C) Oral 79 16 94 % -- --   05/16/22 1600 117/66 -- -- 68 14 93 % -- --   05/16/22 0550 (!) 143/98 98.1  F (36.7  C) Oral 80 16 98 % 1.645 m (5' 4.76\") 111.8 kg (246 lb 7.6 oz)     O2 RA    Gen: NAD  Cardio: RRR  Resp: LS clear anteriorly  Abdomen: soft, obese, appropriately tender, non distended, 5 port incisions c/d/i (2 covered in tegaderm)    A/P:   52 year old POD#0 s/p RA-assisted upper vaginectomy, pelvic mass excision, cystotomy and repair, and cystoscopy for a pelvic mass in the setting of prior 1c borderline mucinous tumor of the left ovary that was resected in 2019. Surgery notable for cystotomy which was repaired, will go home with batres for 10 days. Doing ok postoperatively. Feels ready for discharge with her . Reviewed discharge instructions and precautions. She will follow-up post-op for XR cystogram and trial of void with Dr. Soto (clinic contacted to arrange follow up).    FEN: Advance as tolerated  Pain: Tylenol and oxycodone PRN  GI: Stool softeners PRN  HGB: 12.5 > EBL 20; Appropriate surgical blood loss without signs/symptoms of ongoing bleeding  : Cystotomy and repair during surgery. Discharge with batres for 10-14 days, will follow-up in clinic at that time for cystogram. Take macrobid 100mg " daily while batres in place, prescribed. Batres cath care education provided by RN.     Dispo: To home with postop follow-up    Melanie Corral APRN, DNP, CNP  5/16/2022 4:33 PM

## 2022-05-16 NOTE — DISCHARGE INSTRUCTIONS
[facility]  Same-Day Surgery   Adult Discharge Orders & Instructions     For 24 hours after surgery    Get plenty of rest.  A responsible adult must stay with you for at least 24 hours after you leave the hospital.   Do not drive or use heavy equipment.  If you have weakness or tingling, don't drive or use heavy equipment until this feeling goes away.  Do not drink alcohol.  Avoid strenuous or risky activities.  Ask for help when climbing stairs.   You may feel lightheaded.  IF so, sit for a few minutes before standing.  Have someone help you get up.   If you have nausea (feel sick to your stomach): Drink only clear liquids such as apple juice, ginger ale, broth or 7-Up.  Rest may also help.  Be sure to drink enough fluids.  Move to a regular diet as you feel able.  You may have a slight fever. Call the doctor if your fever is over 100 F (37.7 C) (taken under the tongue) or lasts longer than 24 hours.  You may have a dry mouth, a sore throat, muscle aches or trouble sleeping.  These should go away after 24 hours.  Do not make important or legal decisions.   Call your doctor for any of the followin.  Signs of infection (fever, growing tenderness at the surgery site, a large amount of drainage or bleeding, severe pain, foul-smelling drainage, redness, swelling).    2. It has been over 8 to 10 hours since surgery and you are still not able to urinate (pass water).    3.  Headache for over 24 hours.    4.  Numbness, tingling or weakness the day after surgery (if you had spinal anesthesia).  To contact a doctor, call Dr. Soto at [phone] [OFFICE] or:    '   [phone] and ask for the resident on call for   Gynecology (answered 24 hours a day)  '   Emergency Department:    [facility]: [phone]       (TTY for hearing impaired: [phone])    [facility]: [phone]       (TTY for hearing impaired: [phone])

## 2022-05-16 NOTE — ANESTHESIA CARE TRANSFER NOTE
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    Anesthesia Type:   General     Note:    Oropharynx: oropharynx clear of all foreign objects  Level of Consciousness: awake  Oxygen Supplementation: nasal cannula  Level of Supplemental Oxygen (L/min / FiO2): 6  Independent Airway: airway patency satisfactory and stable  Dentition: dentition unchanged      Patient transferred to: PACU  Comments: Pt remains stable, monitors on alarms in place, report to PACU RN, no complications  Handoff Report: Identifed the Patient, Identified the Reponsible Provider, Reviewed the pertinent medical history, Discussed the surgical course, Reviewed Intra-OP anesthesia mangement and issues during anesthesia, Set expectations for post-procedure period and Allowed opportunity for questions and acknowledgement of understanding      Vitals:  Vitals Value Taken Time   /82 05/16/22 1120   Temp     Pulse 87 05/16/22 1124   Resp 28 05/16/22 1125   SpO2 96 % 05/16/22 1125   Vitals shown include unvalidated device data.    Electronically Signed By: EREN Santiago CRNA  May 16, 2022  11:26 AM

## 2022-05-17 ENCOUNTER — PATIENT OUTREACH (OUTPATIENT)
Dept: FAMILY MEDICINE | Facility: OTHER | Age: 52
End: 2022-05-17
Payer: COMMERCIAL

## 2022-05-17 NOTE — TELEPHONE ENCOUNTER
Reason for follow up call: Sailaja Mcgee appeared on our list for being seen in and recenlty discharge from the Emergency Room/In Patient Hospital Admission.    Chief Complaint   Patient presents with     Hospital F/U     Hospital Discharge- Ovarian tumor removal        TCM Episode YES    Encounter routed for Clinic Triage RN to call for follow up

## 2022-05-20 LAB
PATH REPORT.COMMENTS IMP SPEC: NORMAL
PATH REPORT.FINAL DX SPEC: NORMAL
PATH REPORT.GROSS SPEC: NORMAL
PATH REPORT.INTRAOP OBS SPEC DOC: NORMAL
PATH REPORT.MICROSCOPIC SPEC OTHER STN: NORMAL
PATH REPORT.RELEVANT HX SPEC: NORMAL
PHOTO IMAGE: NORMAL

## 2022-05-23 ENCOUNTER — PATIENT OUTREACH (OUTPATIENT)
Dept: ONCOLOGY | Facility: CLINIC | Age: 52
End: 2022-05-23
Payer: COMMERCIAL

## 2022-05-26 ENCOUNTER — ONCOLOGY VISIT (OUTPATIENT)
Dept: ONCOLOGY | Facility: CLINIC | Age: 52
End: 2022-05-26
Attending: OBSTETRICS & GYNECOLOGY
Payer: COMMERCIAL

## 2022-05-26 VITALS
SYSTOLIC BLOOD PRESSURE: 145 MMHG | BODY MASS INDEX: 40.9 KG/M2 | DIASTOLIC BLOOD PRESSURE: 96 MMHG | WEIGHT: 244 LBS | OXYGEN SATURATION: 96 % | TEMPERATURE: 98.4 F | HEART RATE: 90 BPM

## 2022-05-26 DIAGNOSIS — D39.12 OVARIAN TUMOR OF BORDERLINE MALIGNANCY, LEFT: Primary | ICD-10-CM

## 2022-05-26 PROCEDURE — G0463 HOSPITAL OUTPT CLINIC VISIT: HCPCS

## 2022-05-26 PROCEDURE — 99214 OFFICE O/P EST MOD 30 MIN: CPT | Performed by: OBSTETRICS & GYNECOLOGY

## 2022-05-26 ASSESSMENT — PAIN SCALES - GENERAL: PAINLEVEL: NO PAIN (0)

## 2022-05-26 NOTE — NURSING NOTE
"Oncology Rooming Note    May 26, 2022 12:21 PM   Sailaja Mcgee is a 52 year old female who presents for:    Chief Complaint   Patient presents with     Oncology Clinic Visit     Rtn for post-op/trial of void     Initial Vitals: BP (!) 145/96   Pulse 90   Temp 98.4  F (36.9  C) (Oral)   Wt 110.7 kg (244 lb)   LMP 09/19/2011   SpO2 96%   BMI 40.90 kg/m   Estimated body mass index is 40.9 kg/m  as calculated from the following:    Height as of 5/16/22: 1.645 m (5' 4.76\").    Weight as of this encounter: 110.7 kg (244 lb). Body surface area is 2.25 meters squared.  No Pain (0) Comment: Data Unavailable   Patient's last menstrual period was 09/19/2011.  Allergies reviewed: Yes  Medications reviewed: Yes    Medications: Medication refills not needed today.  Pharmacy name entered into [facility]:    [facility] - [place], [place] - [address] [facility] - [place], [place] - [address]    Clinical concerns: none       Loraine Sanchez, EMT              "

## 2022-05-26 NOTE — PROGRESS NOTES
GYN Oncology Follow Up Visit    Referring provider:    Bert Recinos MD  [address] [place] [address]   RE: Sailaja Mcgee  : 1970  ROGER: 2022       CC: pelvic mass and vaginal mass, history of borderline mucinous tumor.     HPI: Ms Sailaja Mcgee is a 52 year old year old female who presents for follow up of her borderline mucinous tumor and new symtpoms.       Treatment history:   19: MRI of back for back pain  19: CT scan showing large pelvic mass. Ca125 282, CEA 6.9,   7/15/19: XLap/BSO/Appy/Omx/Biopsies/CL. 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 Significant adhesive disease (previous left nephrectomy)  3/4/2022: Pelvic MRI (for rectal symptoms)   3/11/2022: CT C/A/P with complex cystic solid mass of low hemipelvis 5.4cm x 3.5cm. Stable lymphadenopathy.   3/31/22: IR guided biopsy, benign (scant tissue)   2022: Pelvic exam (Gyn Onc - Dr Ivy Soto) with vaginal polyp. Pathology shows mucinous cells, no atypia.   22: Robotic assisted radical upper vaginectomy, resection of pelvic mass, repair of cystotomy. FInal pathology with recurrent borderline mucinous tumor.     Doing well postop. Wants batres out. Pain only around batres. No bleeding. Good energy. Normal Gi function.     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       Past Surgical History:   Procedure Laterality Date     COLONOSCOPY N/A 7/10/2019    Procedure: COLONOSCOPY;  Surgeon: Sheba Tenorio MD;  [facility]     CYSTOSCOPY  10/24/2011    Procedure:CYSTOSCOPY; Surgeon:NAYELY MADRIGAL; [facility]     CYSTOSCOPY N/A 2022    Procedure: cystoscopy,;  Surgeon: Ivy Soto MD;   [facility]     CYSTOSCOPY  2022    Procedure: ;  Surgeon: Ivy Soto MD;  [facility]     EXAM UNDER ANESTHESIA, FISTULOTOMY RECTUM, COMBINED N/A 2022    Procedure: Exam under anesthesia, drainage of abscess and placement of seton;  Surgeon: Pham Winn MD;  [facility]     EXAM UNDER ANESTHESIA, FISTULOTOMY RECTUM, COMBINED  2022    Procedure: ;  Surgeon: Pham Winn MD;  [facility]: [facility]     HC BRAIN MAPPING, 1ST HOUR MD ATTENDANCE      ablation of seizure focus     HC REDUCTION OF LARGE BREAST       HYSTERECTOMY TOTAL ABD, [place] SALPINGO-OOPHORECTOMY, NODE DISSECTION, TUMOR DEBULKING, COMBINED Bilateral 7/15/2019    Procedure: Exploratory Laparotomy, Total Abdominal Hysterectomy, Removal Of Both Tubes And Ovaries, Omentectomy, Appendectomy, Peritoneal Biopsies;  Surgeon: Ivy Soto MD;  [facility]     HYSTERECTOMY VAGINAL  10/24/2011    Procedure:HYSTERECTOMY VAGINAL; vaginal hysterectomy and cystoscopy; Surgeon:NAYELY MADRIGAL; [facility]     HYSTEROSCOPY,ABLATION ENDOMETRIUM       VAGINECTOMY, ROBOT-ASSISTED N/A 2022    Procedure: Robotic assisted radical upper vaginectomy, pelvic mass excision,;  Surgeon: Ivy Soto MD;  [facility]     [facility] LAP,DONOR KIDNEY REMOV,LIVING  2009    Left laparoscopic donor nephrectomy.  Donating to brother. [facility] [place].        OBGYN history and Health Maintenance:    Last Pap Smear: , NILM prior to hysterectomy  Last Mammogram:never  Last Colonoscopy: 2019 at [facility] prior to surgery, normal    Current Outpatient Medications   Medication Sig Dispense Refill     acetaminophen (TYLENOL) 325 MG tablet Take 3 tablets (975 mg) by mouth every 6 hours as needed for mild pain 50 tablet 0     amoxicillin-clavulanate (AUGMENTIN) 875-125 MG tablet TAKE 1 TABLET EVERY 12 HOURS DAILY.       Bioflavonoid Products (VITAMIN C) CHEW Take 1 tablet by mouth daily       mvw complete formulation  (CHEWABLES) tablet Take 1 tablet by mouth daily       nitroFURantoin macrocrystal-monohydrate (MACROBID) 100 MG capsule Take 1 capsule (100 mg) by mouth daily 14 capsule 0     omeprazole (PRILOSEC) 10 MG DR capsule Take 40 mg by mouth every morning        oxyCODONE (ROXICODONE) 5 MG tablet Take 1 tablet (5 mg) by mouth every 6 hours as needed for severe pain 6 tablet 0     senna-docusate (SENOKOT-S/PERICOLACE) 8.6-50 MG tablet Take 1-2 tablets by mouth 2 times daily 30 tablet 0            Allergies   Allergen Reactions     Aspirin Rash     Rash on face        Social History:  Social History     Tobacco Use     Smoking status: Never Smoker     Smokeless tobacco: Never Used     Tobacco comment: no smokers in the household   Substance Use Topics     Alcohol use: Yes     Comment: rarely     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 Linear speech, appropriate affect  :NEFG . Vaginal cuff in tact. No masses. Braswell in bladder.   Perianal catheter in place and sutured in.     Labs/imaging (Personally reviewed,  5/26/2022 )    Final Diagnosis   A. Pelvic mass, excision:  -Mucinous neoplasm, consistent with recurrent mucinous borderline tumor     B. Sigmoid mesenteric mass, excision:  -Mucinous neoplasm, consistent with recurrent mucinous borderline tumor     C. Upper vagina, vaginectomy:  -Mucinous neoplasm, consistent with recurrent mucinous borderline tumor  -Ulceration with underlying  granulation tissue formation  -Surgical margins are negative for mucinous neoplasm  -Fibroadipose tissue identified      Electronically signed by Analisa Staley MD on 5/20/2022 at  4:17 PM       Assessment:    Sailaja Mcgee is a 52 year old woman with a history of stage 1C3 borderline mucinous ovarian neoplasm now with a pelvic and vaginal mass . I discussed her case with Dr Staley in pathology again.      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 Complete resection. Given histology I do not recommend adjuvant chemo or RT. Discussed she is at risk for future recurrences.     - cystogram today, remove batres if no defect  - Followup CT A/P in 4-6 months, 6 months OK.  She will see me at [facility] for followup.     2.) Genetic risk factors were assessed: referral not indicated at this time    3.) Anal fistula: healing well from surgery          Ivy Soto MD    Department of Ob/Gyn and Women's Health  Division of Gynecologic Oncology  [facility]  [phone]

## 2022-05-26 NOTE — LETTER
2022     RE: Sailaja Mcgee  [address]  [place] [address]    Dear Colleague,    Thank you for referring your patient, Sailaja Mcgee, to the [facility]. Please see a copy of my visit note below.    GYN Oncology Follow Up Visit    Referring provider:    Bert Recinos MD  [address] [place] [address]   RE: Sailaja Mcgee  : 1970  ROGER: 2022     CC: pelvic mass and vaginal mass, history of borderline mucinous tumor.     HPI: Ms Sailaja Mcgee is a 52 year old year old female who presents for follow up of her borderline mucinous tumor and new symtpoms.     Treatment history:   19: MRI of back for back pain  19: CT scan showing large pelvic mass. Ca125 282, CEA 6.9,   7/15/19: XLap/BSO/Appy/Omx/Biopsies/CL. 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 Significant adhesive disease (previous left nephrectomy)  3/4/2022: Pelvic MRI (for rectal symptoms)   3/11/2022: CT C/A/P with complex cystic solid mass of low hemipelvis 5.4cm x 3.5cm. Stable lymphadenopathy.   3/31/22: IR guided biopsy, benign (scant tissue)   2022: Pelvic exam (Gyn Onc - Dr Ivy Soto) with vaginal polyp. Pathology shows mucinous cells, no atypia.   22: Robotic assisted radical upper vaginectomy, resection of pelvic mass, repair of cystotomy. FInal pathology with recurrent borderline mucinous tumor.     Doing well postop. Wants batres out. Pain only around batres. No bleeding. Good energy. Normal Gi function.     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       Past Surgical History:   Procedure Laterality Date     COLONOSCOPY N/A 7/10/2019    Procedure: COLONOSCOPY;   Surgeon: Sheba Tenorio MD;  [facility]     CYSTOSCOPY  10/24/2011    Procedure:CYSTOSCOPY; Surgeon:NAYELY MADRIGAL; [facility]     CYSTOSCOPY N/A 2022    Procedure: cystoscopy,;  Surgeon: Ivy Soto MD;  [facility]     CYSTOSCOPY  2022    Procedure: ;  Surgeon: Ivy Soto MD;  [facility]     EXAM UNDER ANESTHESIA, FISTULOTOMY RECTUM, COMBINED N/A 2022    Procedure: Exam under anesthesia, drainage of abscess and placement of seton;  Surgeon: Pham Winn MD;  [facility]     EXAM UNDER ANESTHESIA, FISTULOTOMY RECTUM, COMBINED  2022    Procedure: ;  Surgeon: Pham Winn MD;  Location: [facility] OR     HC BRAIN MAPPING, 1ST HOUR MD ATTENDANCE      ablation of seizure focus     HC REDUCTION OF LARGE BREAST       HYSTERECTOMY TOTAL ABD, [place] SALPINGO-OOPHORECTOMY, NODE DISSECTION, TUMOR DEBULKING, COMBINED Bilateral 7/15/2019    Procedure: Exploratory Laparotomy, Total Abdominal Hysterectomy, Removal Of Both Tubes And Ovaries, Omentectomy, Appendectomy, Peritoneal Biopsies;  Surgeon: Ivy Soto MD;  [facility]     HYSTERECTOMY VAGINAL  10/24/2011    Procedure:HYSTERECTOMY VAGINAL; vaginal hysterectomy and cystoscopy; Surgeon:NAYELY MADRIGAL; [facility]     HYSTEROSCOPY,ABLATION ENDOMETRIUM       VAGINECTOMY, ROBOT-ASSISTED N/A 2022    Procedure: Robotic assisted radical upper vaginectomy, pelvic mass excision,;  Surgeon: Ivy Soto MD;  [facility]     [facility] LAP,DONOR KIDNEY REMOV,LIVING  2009    Left laparoscopic donor nephrectomy.  Donating to brother. [facility] [place].      OBGYN history and Health Maintenance:    Last Pap Smear: , NILM prior to hysterectomy  Last Mammogram:never  Last Colonoscopy: 2019 at [facility] prior to surgery, normal    Current Outpatient Medications   Medication Sig Dispense Refill     acetaminophen (TYLENOL) 325 MG tablet Take 3 tablets (975 mg) by mouth every 6 hours as  needed for mild pain 50 tablet 0     amoxicillin-clavulanate (AUGMENTIN) 875-125 MG tablet TAKE 1 TABLET EVERY 12 HOURS DAILY.       Bioflavonoid Products (VITAMIN C) CHEW Take 1 tablet by mouth daily       mvw complete formulation (CHEWABLES) tablet Take 1 tablet by mouth daily       nitroFURantoin macrocrystal-monohydrate (MACROBID) 100 MG capsule Take 1 capsule (100 mg) by mouth daily 14 capsule 0     omeprazole (PRILOSEC) 10 MG DR capsule Take 40 mg by mouth every morning        oxyCODONE (ROXICODONE) 5 MG tablet Take 1 tablet (5 mg) by mouth every 6 hours as needed for severe pain 6 tablet 0     senna-docusate (SENOKOT-S/PERICOLACE) 8.6-50 MG tablet Take 1-2 tablets by mouth 2 times daily 30 tablet 0        Allergies   Allergen Reactions     Aspirin Rash     Rash on face        Social History:  Social History     Tobacco Use     Smoking status: Never Smoker     Smokeless tobacco: Never Used     Tobacco comment: no smokers in the household   Substance Use Topics     Alcohol use: Yes     Comment: rarely     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 Linear speech, appropriate affect  :NEFG . Vaginal cuff in tact. No masses. Braswell in bladder.   Perianal catheter in place and sutured in.     Labs/imaging (Personally reviewed,  5/26/2022 )    Final Diagnosis   A. Pelvic mass, excision:  -Mucinous neoplasm, consistent with recurrent mucinous borderline tumor     B. Sigmoid mesenteric  mass, excision:  -Mucinous neoplasm, consistent with recurrent mucinous borderline tumor     C. Upper vagina, vaginectomy:  -Mucinous neoplasm, consistent with recurrent mucinous borderline tumor  -Ulceration with underlying granulation tissue formation  -Surgical margins are negative for mucinous neoplasm  -Fibroadipose tissue identified      Electronically signed by Analisa Staley MD on 5/20/2022 at  4:17 PM     Assessment:  Sailaja Mcgee is a 52 year old woman with a history of stage 1C3 borderline mucinous ovarian neoplasm now with a pelvic and vaginal mass . I discussed her case with Dr Staley in pathology again.      Plan:   1.)   Stage 1C borderline mucinous ovarian tumor: Recurrence. Complete resection. Given histology I do not recommend adjuvant chemo or RT. Discussed she is at risk for future recurrences.     - cystogram today, remove batres if no defect  - Followup CT A/P in 4-6 months, 6 months OK.  She will see me at [facility] for followup.     2.) Genetic risk factors were assessed: referral not indicated at this time    3.) Anal fistula: healing well from surgery      Ivy Soto MD    Department of Ob/Gyn and Women's Health  Division of Gynecologic Oncology  [facility]  [phone]

## 2022-06-06 ENCOUNTER — NURSE TRIAGE (OUTPATIENT)
Dept: FAMILY MEDICINE | Facility: OTHER | Age: 52
End: 2022-06-06
Payer: COMMERCIAL

## 2022-06-06 NOTE — TELEPHONE ENCOUNTER
"There is no available appts today/tomorrow at clinic.  Routing to MD to advise if able to add on or take same day slot tomorrow.  She is requesting her own provider if possible.  Please  Advise.    Answer Assessment - Initial Assessment Questions  1. DESCRIPTION: \"Describe your dizziness.\"      Onset yesterday pm.  States is experiencing \"nauseous dizziness\"  Is not spinning.  She states she felt better initally when woke up this morning but then came back again once up and moving.  She states it gets worse when she stands.  Describes as \"nauseous dizziness\", no vomiting or diarrhea.  No fever.  States no other symptoms.  2. LIGHTHEADED: \"Do you feel lightheaded?\" (e.g., somewhat faint, woozy, weak upon standing)      States is more of a \"nauseous dizziness\"  States could describe as a light headed feeling  3. VERTIGO: \"Do you feel like either you or the room is spinning or tilting?\" (i.e. vertigo)      no  4. SEVERITY: \"How bad is it?\"  \"Do you feel like you are going to faint?\" \"Can you stand and walk?\"    - MILD - walking normally    - MODERATE - interferes with normal activities (e.g., work, school)     - SEVERE - unable to stand, requires support to walk, feels like passing out now.       Is able to walk about with her symptoms.  Does not feel like she is going to pass out but feels light headed/nauseous.  5. ONSET:  \"When did the dizziness begin?\"      Last pm  6. AGGRAVATING FACTORS: \"Does anything make it worse?\" (e.g., standing, change in head position)      Standing up will make it worse.  Still present when sitting down  7. HEART RATE: \"Can you tell me your heart rate?\" \"How many beats in 15 seconds?\"  (Note: not all patients can do this)          8. CAUSE: \"What do you think is causing the dizziness?\"      unknown  9. RECURRENT SYMPTOM: \"Have you had dizziness before?\" If so, ask: \"When was the last time?\" \"What happened that time?\"      no  10. OTHER SYMPTOMS: \"Do you have any other symptoms?\" (e.g., " "fever, chest pain, vomiting, diarrhea, bleeding)        denies  11. PREGNANCY: \"Is there any chance you are pregnant?\" \"When was your last menstrual period?\"        No    Patient states that she had surgery 5/16 and 2 tumors were removed; one on colon and one on bladder.   She states current medications are omeprazole once daily and takes a senokot 1 tab in a.m.  She has a bm approx every other day;  She denies constipation.  Is not taking pain relievers;  States did not use the narcotic pain relievers.  She drinks approx 3 cups of water daily and 1 large glass of Coke.  I did advise she increase her water intake.  Does not check her BP at home.  There is no available appts today/tomorrow at clinic.  Routing to MD to advise if able to add on or take same day slot tomorrow.  She is requesting her own provider if possible.  Please  Advise.  Jennifer Stuart RN    Protocols used: DIZZINESS-A-OH      "

## 2022-06-07 ENCOUNTER — OFFICE VISIT (OUTPATIENT)
Dept: FAMILY MEDICINE | Facility: OTHER | Age: 52
End: 2022-06-07
Payer: COMMERCIAL

## 2022-06-07 VITALS
TEMPERATURE: 97.9 F | HEIGHT: 66 IN | SYSTOLIC BLOOD PRESSURE: 138 MMHG | HEART RATE: 98 BPM | OXYGEN SATURATION: 98 % | BODY MASS INDEX: 38.57 KG/M2 | RESPIRATION RATE: 18 BRPM | WEIGHT: 240 LBS | DIASTOLIC BLOOD PRESSURE: 88 MMHG

## 2022-06-07 DIAGNOSIS — R42 DIZZINESS: Primary | ICD-10-CM

## 2022-06-07 LAB
ALBUMIN SERPL-MCNC: 3.3 G/DL (ref 3.4–5)
ALP SERPL-CCNC: 112 U/L (ref 40–150)
ALT SERPL W P-5'-P-CCNC: 23 U/L (ref 0–50)
ANION GAP SERPL CALCULATED.3IONS-SCNC: 4 MMOL/L (ref 3–14)
AST SERPL W P-5'-P-CCNC: 15 U/L (ref 0–45)
BILIRUB SERPL-MCNC: 0.3 MG/DL (ref 0.2–1.3)
BUN SERPL-MCNC: 17 MG/DL (ref 7–30)
CALCIUM SERPL-MCNC: 9 MG/DL (ref 8.5–10.1)
CHLORIDE BLD-SCNC: 105 MMOL/L (ref 94–109)
CO2 SERPL-SCNC: 30 MMOL/L (ref 20–32)
CREAT SERPL-MCNC: 1.11 MG/DL (ref 0.52–1.04)
ERYTHROCYTE [DISTWIDTH] IN BLOOD BY AUTOMATED COUNT: 14 % (ref 10–15)
GFR SERPL CREATININE-BSD FRML MDRD: 60 ML/MIN/1.73M2
GLUCOSE BLD-MCNC: 143 MG/DL (ref 70–99)
HCT VFR BLD AUTO: 40.8 % (ref 35–47)
HGB BLD-MCNC: 13.1 G/DL (ref 11.7–15.7)
MCH RBC QN AUTO: 28.8 PG (ref 26.5–33)
MCHC RBC AUTO-ENTMCNC: 32.1 G/DL (ref 31.5–36.5)
MCV RBC AUTO: 90 FL (ref 78–100)
PLATELET # BLD AUTO: 377 10E3/UL (ref 150–450)
POTASSIUM BLD-SCNC: 3.7 MMOL/L (ref 3.4–5.3)
PROT SERPL-MCNC: 7.8 G/DL (ref 6.8–8.8)
RBC # BLD AUTO: 4.55 10E6/UL (ref 3.8–5.2)
SODIUM SERPL-SCNC: 139 MMOL/L (ref 133–144)
TSH SERPL DL<=0.005 MIU/L-ACNC: 1.92 MU/L (ref 0.4–4)
WBC # BLD AUTO: 8.1 10E3/UL (ref 4–11)

## 2022-06-07 PROCEDURE — 84443 ASSAY THYROID STIM HORMONE: CPT | Performed by: PHYSICIAN ASSISTANT

## 2022-06-07 PROCEDURE — 80053 COMPREHEN METABOLIC PANEL: CPT | Performed by: PHYSICIAN ASSISTANT

## 2022-06-07 PROCEDURE — 99214 OFFICE O/P EST MOD 30 MIN: CPT | Performed by: PHYSICIAN ASSISTANT

## 2022-06-07 PROCEDURE — 36415 COLL VENOUS BLD VENIPUNCTURE: CPT | Performed by: PHYSICIAN ASSISTANT

## 2022-06-07 PROCEDURE — 85027 COMPLETE CBC AUTOMATED: CPT | Performed by: PHYSICIAN ASSISTANT

## 2022-06-07 ASSESSMENT — ENCOUNTER SYMPTOMS: NAUSEA: 1

## 2022-06-07 ASSESSMENT — PAIN SCALES - GENERAL: PAINLEVEL: NO PAIN (0)
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
## 2022-06-07 NOTE — PATIENT INSTRUCTIONS
We are going to do bloodwork today with the recent dizziness including blood counts, kidney function, electrolytes and thyroid.     Epley Maneuver's for BPPV provided-see additional handout. Try to do these 1-2 times per day for a week. If symptoms are not improving please call the clinic or send a mychart message and we will give a referral to physical therapy.

## 2022-06-08 ENCOUNTER — TELEPHONE (OUTPATIENT)
Dept: FAMILY MEDICINE | Facility: OTHER | Age: 52
End: 2022-06-08
Payer: COMMERCIAL

## 2022-06-08 NOTE — TELEPHONE ENCOUNTER
Patient returned call and informed of providers note below in regards to her recent lab results. Sailaja Elder LPN

## 2022-06-08 NOTE — TELEPHONE ENCOUNTER
----- Message from Suellen Millard PA-C sent at 6/7/2022  1:37 PM CDT -----  Please call patient. Her labs show stable kidney function, her glucose is slightly elevated but this wasn't fasting. Her thyroid and blood count are normal. Continue with plan we discussed today.     Suellen Millard PA-C

## 2022-07-27 NOTE — Clinical Note
Neil Soto.  You will be seeing Sailaja this week.  She noted that with her recent biopsy that she was awake for it as the IV didn't work for her and she noticed them say they had aspirated fluid out.  She notes after the procedure her rectal mass was significantly reduced.  She was under the impression they were going to biopsy the mass found on imaging, she is a little concerned that this wasn't the right mass biopsied and that the mass is pressing on her bladder.  Just CHALO and she will discuss with you as well.   Thanks  Suellen Millard PA-C  Clear

## 2022-11-07 ENCOUNTER — PATIENT OUTREACH (OUTPATIENT)
Dept: ONCOLOGY | Facility: CLINIC | Age: 52
End: 2022-11-07

## 2022-11-07 NOTE — PROGRESS NOTES
Patient left voicemail stating that she had questions about an upcoming appt    RN reached out but patient was unavailable.     Voicemail with call back number left    Maritza Brasher RN

## 2022-11-07 NOTE — PROGRESS NOTES
Patient called back and stated her  is switching jobs and they are in a gap of no insurance.     They will have insurance starting Dec 1st so patient would like appointments rescheduled.     This request was placed    Maritza Brasher RN    
weight-bearing as tolerated

## 2022-12-07 ENCOUNTER — HOSPITAL ENCOUNTER (OUTPATIENT)
Dept: CT IMAGING | Facility: CLINIC | Age: 52
Discharge: HOME | End: 2022-12-07
Attending: OBSTETRICS & GYNECOLOGY | Admitting: OBSTETRICS & GYNECOLOGY
Payer: COMMERCIAL

## 2022-12-07 DIAGNOSIS — D39.12 OVARIAN TUMOR OF BORDERLINE MALIGNANCY, LEFT: ICD-10-CM

## 2022-12-07 PROCEDURE — 250N000009 HC RX 250: Performed by: OBSTETRICS & GYNECOLOGY

## 2022-12-07 PROCEDURE — 250N000011 HC RX IP 250 OP 636: Performed by: OBSTETRICS & GYNECOLOGY

## 2022-12-07 PROCEDURE — 74177 CT ABD & PELVIS W/CONTRAST: CPT

## 2022-12-07 RX ORDER — IOPAMIDOL 755 MG/ML
500 INJECTION, SOLUTION INTRAVASCULAR ONCE
Status: COMPLETED | OUTPATIENT
Start: 2022-12-07 | End: 2022-12-07

## 2022-12-07 RX ADMIN — IOPAMIDOL 89 ML: 755 INJECTION, SOLUTION INTRAVENOUS at 11:17

## 2022-12-07 RX ADMIN — SODIUM CHLORIDE 60 ML: 9 INJECTION, SOLUTION INTRAVENOUS at 11:17

## 2022-12-14 ENCOUNTER — ONCOLOGY VISIT (OUTPATIENT)
Dept: ONCOLOGY | Facility: CLINIC | Age: 52
End: 2022-12-14
Attending: OBSTETRICS & GYNECOLOGY
Payer: COMMERCIAL

## 2022-12-14 VITALS
DIASTOLIC BLOOD PRESSURE: 81 MMHG | WEIGHT: 243.2 LBS | BODY MASS INDEX: 39.86 KG/M2 | SYSTOLIC BLOOD PRESSURE: 137 MMHG | HEART RATE: 82 BPM | RESPIRATION RATE: 20 BRPM | OXYGEN SATURATION: 100 % | TEMPERATURE: 97.2 F

## 2022-12-14 DIAGNOSIS — D39.12 OVARIAN TUMOR OF BORDERLINE MALIGNANCY, LEFT: Primary | ICD-10-CM

## 2022-12-14 PROCEDURE — 99213 OFFICE O/P EST LOW 20 MIN: CPT | Performed by: OBSTETRICS & GYNECOLOGY

## 2022-12-14 PROCEDURE — 99212 OFFICE O/P EST SF 10 MIN: CPT | Performed by: OBSTETRICS & GYNECOLOGY

## 2022-12-14 RX ORDER — BIOTIN 10 MG
TABLET ORAL
COMMUNITY
End: 2022-12-27

## 2022-12-14 ASSESSMENT — PAIN SCALES - GENERAL: PAINLEVEL: NO PAIN (0)

## 2022-12-14 NOTE — PROGRESS NOTES
GYN Oncology Follow Up Visit    Referring provider:    Bert Recinos MD  [address] [place] [address]   RE: Sailaja Mcgee  : 1970  ROGER: 2022       CC: pelvic mass and vaginal mass, history of borderline mucinous tumor.     HPI: Ms Sailaja Mcgee is a 52 year old year old female who presents for follow up of recurrent  borderline mucinous tumor       Treatment history:   19: MRI of back for back pain  19: CT scan showing large pelvic mass. Ca125 282, CEA 6.9,   7/15/19: XLap/BSO/Appy/Omx/Biopsies/CL. 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 Significant adhesive disease (previous left nephrectomy)  3/4/2022: Pelvic MRI (for rectal symptoms)   3/11/2022: CT C/A/P with complex cystic solid mass of low hemipelvis 5.4cm x 3.5cm. Stable lymphadenopathy.  12.   3/31/22: IR guided biopsy, benign (scant tissue)   2022: Pelvic exam (Gyn Onc - Dr Ivy Soto) with vaginal polyp. Pathology shows mucinous cells, no atypia.   22: Robotic assisted radical upper vaginectomy, resection of pelvic mass, repair of cystotomy. FInal pathology with recurrent borderline mucinous tumor.     Doing well postop. Wants batres out. Pain only around batres. No bleeding. Good energy. Normal Gi function.     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       Past Surgical History:   Procedure Laterality Date     COLONOSCOPY N/A 7/10/2019    Procedure: COLONOSCOPY;  Surgeon: Sheba Tenorio MD;  [facility]     CYSTOSCOPY  10/24/2011    Procedure:CYSTOSCOPY; Surgeon:NAYELY MADRIGAL; Location:[facility] OR     CYSTOSCOPY N/A 2022    Procedure: cystoscopy,;  Surgeon: Ivy Soto MD;   Location: [facility] OR     CYSTOSCOPY  2022    Procedure: ;  Surgeon: Ivy Soto MD;  Location: [facility]     EXAM UNDER ANESTHESIA, FISTULOTOMY RECTUM, COMBINED N/A 2022    Procedure: Exam under anesthesia, drainage of abscess and placement of seton;  Surgeon: Pham Winn MD;  Location: [facility] OR     EXAM UNDER ANESTHESIA, FISTULOTOMY RECTUM, COMBINED  2022    Procedure: ;  Surgeon: Pham Winn MD;  Location: [facility] OR     HC BRAIN MAPPING, 1ST HOUR MD ATTENDANCE      ablation of seizure focus     HC REDUCTION OF LARGE BREAST       HYSTERECTOMY TOTAL ABD, [place] SALPINGO-OOPHORECTOMY, NODE DISSECTION, TUMOR DEBULKING, COMBINED Bilateral 7/15/2019    Procedure: Exploratory Laparotomy, Total Abdominal Hysterectomy, Removal Of Both Tubes And Ovaries, Omentectomy, Appendectomy, Peritoneal Biopsies;  Surgeon: Ivy Soto MD;  Location: [facility] OR     HYSTERECTOMY VAGINAL  10/24/2011    Procedure:HYSTERECTOMY VAGINAL; vaginal hysterectomy and cystoscopy; Surgeon:NAYELY MADRIGAL; Location:[facility] OR     HYSTEROSCOPY,ABLATION ENDOMETRIUM       VAGINECTOMY, ROBOT-ASSISTED N/A 2022    Procedure: Robotic assisted radical upper vaginectomy, pelvic mass excision,;  Surgeon: Ivy Soto MD;  Location: [facility] OR     [facility],DONOR KIDNEY REMOV,LIVING  2009    Left laparoscopic donor nephrectomy.  Donating to brother. [facility] [place].        OBGYN history and Health Maintenance:    Last Pap Smear: , NILM prior to hysterectomy  Last Mammogram:never  Last Colonoscopy: 2019 at [facility] prior to surgery, normal    Current Outpatient Medications   Medication Sig Dispense Refill     amoxicillin-clavulanate (AUGMENTIN) 875-125 MG tablet        Multiple Vitamins-Minerals (MULTIVITAMIN ADULT) CHEW        acetaminophen (TYLENOL) 325 MG tablet Take 3 tablets (975 mg) by mouth every 6 hours as needed for mild pain (Patient not taking: Reported on 2022) 50 tablet 0     omeprazole (PRILOSEC) 10 MG  DR capsule Take 40 mg by mouth every morning  (Patient not taking: Reported on 12/14/2022)       senna-docusate (SENOKOT-S/PERICOLACE) 8.6-50 MG tablet Take 1-2 tablets by mouth 2 times daily (Patient not taking: Reported on 12/14/2022) 30 tablet 0            Allergies   Allergen Reactions     Aspirin Rash     Rash on face        Social History:  Social History     Tobacco Use     Smoking status: Never     Smokeless tobacco: Never     Tobacco comments:     no smokers in the household   Substance Use Topics     Alcohol use: Yes     Comment: rarely     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  Ethnicity identification: White  Preferred language: English  Lives at home with:  and roommate/friend    Family History:   The patient's family history is notable for:  PGF with colon cancer  Dad with lung cancer (exposure to agent orange)  Second cousin with metastatic cancer (unknown origin)       Physical Exam:   /81 (BP Location: Left arm, Patient Position: Sitting, Cuff Size: Adult Large)   Pulse 82   Temp 97.2  F (36.2  C) (Oral)   Resp 20   Wt 110.3 kg (243 lb 3.2 oz)   LMP 09/19/2011   SpO2 100%   BMI 39.86 kg/m      General: Alert and oriented, no acute distress  Psych: Mood stable. Linear speech, appropriate affect  :NEFG . Vaginal cuff in tact. No masses. No pelvic exam  Perianal catheter in place and sutured in (from jalen-anal abscess)     Labs/imaging (Personally reviewed, 12/14/2022 )                                                                    IMPRESSION:  1.  Interval removal, resolution, or significantly decreased size of  the cystic and solid mass in the left posterior pelvis, which is no  longer identified.  2.  Postoperative changes status post left nephrectomy.  3.  Small hiatal hernia is again noted.  4.  Evidence of chronic granulomatous disease of the chest is again  noted.  5.  Stable minimally prominent retroperitoneal lymph nodes which are  still within the  normal size criterion. No intraperitoneal  lymphadenopathy.  6.  Mild hepatic steatosis.  7.  No evidence for metastasis.     LANG GIMENEZ MD       Assessment:    Sailaja Mcgee is a 52 year old woman with a history of stage 1C3 borderline mucinous ovarian neoplasm, recurrent now s/p resection.       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 Complete resection. Given histology I did not recommend adjuvant chemo or RT. CT scan IJEOMA today. Exam IJEOMA.     - Followup with me in person for pelvic exam in 6 months  -Imaging only if symptoms  - Given  not elevated at time of recurence, would not follow at this point      2.) Genetic risk factors were assessed: referral not indicated at this time    3.) Anal fistula: per colorectal           Ivy Soto MD    Department of Ob/Gyn and Women's Health  Division of Gynecologic Oncology  [facility]  [phone]    25 minutes today including review of imaging, consultation, exam, documenation

## 2022-12-14 NOTE — PROGRESS NOTES
"Oncology Rooming Note    December 14, 2022 4:02 PM   Sailaja Mcgee is a 52 year old female who presents for:    Chief Complaint   Patient presents with     Oncology Clinic Visit     Ovarian tumor of borderline malignancy, left     Initial Vitals: /81 (BP Location: Left arm, Patient Position: Sitting, Cuff Size: Adult Large)   Pulse 82   Temp 97.2  F (36.2  C) (Oral)   Resp 20   Wt 110.3 kg (243 lb 3.2 oz)   LMP 09/19/2011   SpO2 100%   BMI 39.86 kg/m   Estimated body mass index is 39.86 kg/m  as calculated from the following:    Height as of 6/7/22: 1.664 m (5' 5.5\").    Weight as of this encounter: 110.3 kg (243 lb 3.2 oz). Body surface area is 2.26 meters squared.  No Pain (0) Comment: Data Unavailable   Patient's last menstrual period was 09/19/2011.  Allergies reviewed: Yes  Medications reviewed: Yes    Medications: Medication refills not needed today.  Pharmacy name entered into [facility]:    [facility] - [place], [place] - [address] [facility] - [place], [place] - [address]    Clinical concerns: no     Radha Galeano CMA              "

## 2022-12-14 NOTE — LETTER
"    2022         RE: Sailaja Mcgee  [address] [facility] [address]        Dear Colleague,    Thank you for referring your patient, Sailaja Mcgee, to the [facility]. Please see a copy of my visit note below.    Oncology Rooming Note    2022 4:02 PM   Sailaja Mcgee is a 52 year old female who presents for:    Chief Complaint   Patient presents with     Oncology Clinic Visit     Ovarian tumor of borderline malignancy, left     Initial Vitals: /81 (BP Location: Left arm, Patient Position: Sitting, Cuff Size: Adult Large)   Pulse 82   Temp 97.2  F (36.2  C) (Oral)   Resp 20   Wt 110.3 kg (243 lb 3.2 oz)   LMP 2011   SpO2 100%   BMI 39.86 kg/m   Estimated body mass index is 39.86 kg/m  as calculated from the following:    Height as of 22: 1.664 m (5' 5.5\").    Weight as of this encounter: 110.3 kg (243 lb 3.2 oz). Body surface area is 2.26 meters squared.  No Pain (0) Comment: Data Unavailable   Patient's last menstrual period was 2011.  Allergies reviewed: Yes  Medications reviewed: Yes    Medications: Medication refills not needed today.  Pharmacy name entered into [facility]:    [facility]  - [place], [place] - [address] [facility] - [place], [place] - [address]    Clinical concerns: no     Radha Galeano, [facility]                GYN Oncology Follow Up Visit    Referring provider:    Bert Recinos MD  [address] [place] [address]   RE: Sailaja Mcgee  : 1970  ROGER: 2022       CC: pelvic mass and vaginal mass, history of borderline mucinous tumor.     HPI: Ms Sailaja Mcgee is a 52 year old year old female who presents for follow up of recurrent  borderline mucinous tumor       Treatment history:   19: MRI of back for back pain  19: CT scan showing large pelvic mass. Ca125 282, CEA 6.9,   7/15/19: XLap/BSO/Appy/Omx/Biopsies/CL. Final pathology stage 1C3 borderline mucinous " tumor with microinvasion. Significant adhesive disease (previous left nephrectomy)  3/4/2022: Pelvic MRI (for rectal symptoms)   3/11/2022: CT C/A/P with complex cystic solid mass of low hemipelvis 5.4cm x 3.5cm. Stable lymphadenopathy.  12.   3/31/22: IR guided biopsy, benign (scant tissue)   4/21/2022: Pelvic exam (Gyn Onc - Dr Ivy Soto) with vaginal polyp. Pathology shows mucinous cells, no atypia.   5/16/22: Robotic assisted radical upper vaginectomy, resection of pelvic mass, repair of cystotomy. FInal pathology with recurrent borderline mucinous tumor.     Doing well postop. Wants batres out. Pain only around batres. No bleeding. Good energy. Normal Gi function.     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       Past Surgical History:   Procedure Laterality Date     COLONOSCOPY N/A 7/10/2019    Procedure: COLONOSCOPY;  Surgeon: Sheba Tenorio MD;  [facility]     CYSTOSCOPY  10/24/2011    Procedure:CYSTOSCOPY; Surgeon:NAYELY MADRIGAL; Location:[facility] OR     CYSTOSCOPY N/A 5/16/2022    Procedure: cystoscopy,;  Surgeon: Ivy Soto MD;  [facility]: [facility]     CYSTOSCOPY  5/16/2022    Procedure: ;  Surgeon: Ivy Soto MD;  Location: [facility]     EXAM UNDER ANESTHESIA, FISTULOTOMY RECTUM, COMBINED N/A 4/22/2022    Procedure: Exam under anesthesia, drainage of abscess and placement of seton;  Surgeon: Pham Winn MD;  Location: [facility] OR     EXAM UNDER ANESTHESIA, FISTULOTOMY RECTUM, COMBINED  4/22/2022    Procedure: ;  Surgeon: Pham Winn MD;  Location: [facility] OR     HC BRAIN MAPPING, 1ST HOUR MD ATTENDANCE  2001    ablation of seizure focus     HC REDUCTION OF LARGE BREAST  2001     HYSTERECTOMY  TOTAL ABD, [place] SALPINGO-OOPHORECTOMY, NODE DISSECTION, TUMOR DEBULKING, COMBINED Bilateral 7/15/2019    Procedure: Exploratory Laparotomy, Total Abdominal Hysterectomy, Removal Of Both Tubes And Ovaries, Omentectomy, Appendectomy, Peritoneal Biopsies;  Surgeon: Ivy Soto MD;  Location: [facility]     HYSTERECTOMY VAGINAL  10/24/2011    Procedure:HYSTERECTOMY VAGINAL; vaginal hysterectomy and cystoscopy; Surgeon:NAYELY MADRIGAL; Location:[facility] OR     HYSTEROSCOPY,ABLATION ENDOMETRIUM  2001     VAGINECTOMY, ROBOT-ASSISTED N/A 2022    Procedure: Robotic assisted radical upper vaginectomy, pelvic mass excision,;  Surgeon: Ivy Soto MD;  Location: [facility] OR     [facility] LAP,DONOR KIDNEY REMOV,LIVING  2009    Left laparoscopic donor nephrectomy.  Donating to brother. [facility] [place].        OBGYN history and Health Maintenance:    Last Pap Smear: , NILM prior to hysterectomy  Last Mammogram:never  Last Colonoscopy: 2019 at [facility] prior to surgery, normal    Current Outpatient Medications   Medication Sig Dispense Refill     amoxicillin-clavulanate (AUGMENTIN) 875-125 MG tablet        Multiple Vitamins-Minerals (MULTIVITAMIN ADULT) CHEW        acetaminophen (TYLENOL) 325 MG tablet Take 3 tablets (975 mg) by mouth every 6 hours as needed for mild pain (Patient not taking: Reported on 2022) 50 tablet 0     omeprazole (PRILOSEC) 10 MG DR capsule Take 40 mg by mouth every morning  (Patient not taking: Reported on 2022)       senna-docusate (SENOKOT-S/PERICOLACE) 8.6-50 MG tablet Take 1-2 tablets by mouth 2 times daily (Patient not taking: Reported on 2022) 30 tablet 0            Allergies   Allergen Reactions     Aspirin Rash     Rash on face        Social History:  Social History     Tobacco Use     Smoking status: Never     Smokeless tobacco: Never     Tobacco comments:     no smokers in the household   Substance Use Topics     Alcohol use: Yes     Comment: rarely     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  Ethnicity identification: White  Preferred language: English  Lives at home with:  and roommate/friend    Family History:   The patient's family history is notable for:  PGF with colon cancer  Dad with lung cancer (exposure to agent orange)  Second cousin with metastatic cancer (unknown origin)       Physical Exam:   /81 (BP Location: Left arm, Patient Position: Sitting, Cuff Size: Adult Large)   Pulse 82   Temp 97.2  F (36.2  C) (Oral)   Resp 20   Wt 110.3 kg (243 lb 3.2 oz)   LMP 09/19/2011   SpO2 100%   BMI 39.86 kg/m      General: Alert and oriented, no acute distress  Psych: Mood stable. Linear speech, appropriate affect  :NEFG . Vaginal cuff in tact. No masses. No pelvic exam  Perianal catheter in place and sutured in (from jalen-anal abscess)     Labs/imaging (Personally reviewed, 12/14/2022 )                                                                    IMPRESSION:  1.  Interval removal, resolution, or significantly decreased size of  the cystic and solid mass in the left posterior pelvis, which is no  longer identified.  2.  Postoperative changes status post left nephrectomy.  3.  Small hiatal hernia is again noted.  4.  Evidence of chronic granulomatous disease of the chest is again  noted.  5.  Stable minimally prominent retroperitoneal lymph nodes which are  still within the normal size criterion. No intraperitoneal  lymphadenopathy.  6.  Mild hepatic steatosis.  7.  No evidence for metastasis.     LANG GIMENEZ MD       Assessment:    Sailaja Mcgee is a 52 year old woman with a history of stage 1C3 borderline mucinous ovarian neoplasm, recurrent now s/p resection.       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 Complete resection. Given histology I did not recommend adjuvant chemo or RT. CT scan IJEOMA today. Exam IJEOMA.     - Followup with me in person for pelvic exam in 6 months  -Imaging only if symptoms  - Given   not elevated at time of recurence, would not follow at this point      2.) Genetic risk factors were assessed: referral not indicated at this time    3.) Anal fistula: per colorectal           Ivy Soto MD    Department of Ob/Gyn and Women's Health  Division of Gynecologic Oncology  [facility]  [phone]    25 minutes today including review of imaging, consultation, exam, documenation          Again, thank you for allowing me to participate in the care of your patient.        Sincerely,        Ivy Soto MD

## 2022-12-21 ENCOUNTER — TELEPHONE (OUTPATIENT)
Dept: NEUROLOGY | Facility: CLINIC | Age: 52
End: 2022-12-21

## 2022-12-21 ENCOUNTER — HOSPITAL ENCOUNTER (EMERGENCY)
Facility: CLINIC | Age: 52
Discharge: HOME | End: 2022-12-21
Attending: EMERGENCY MEDICINE | Admitting: EMERGENCY MEDICINE
Payer: COMMERCIAL

## 2022-12-21 ENCOUNTER — TELEPHONE (OUTPATIENT)
Dept: FAMILY MEDICINE | Facility: OTHER | Age: 52
End: 2022-12-21

## 2022-12-21 ENCOUNTER — APPOINTMENT (OUTPATIENT)
Dept: MRI IMAGING | Facility: CLINIC | Age: 52
End: 2022-12-21
Attending: EMERGENCY MEDICINE
Payer: COMMERCIAL

## 2022-12-21 VITALS
RESPIRATION RATE: 16 BRPM | DIASTOLIC BLOOD PRESSURE: 89 MMHG | HEART RATE: 80 BPM | OXYGEN SATURATION: 97 % | BODY MASS INDEX: 40.47 KG/M2 | SYSTOLIC BLOOD PRESSURE: 144 MMHG | TEMPERATURE: 98 F | HEIGHT: 65 IN

## 2022-12-21 DIAGNOSIS — G44.209 TENSION HEADACHE: ICD-10-CM

## 2022-12-21 DIAGNOSIS — G45.4 TRANSIENT GLOBAL AMNESIA: ICD-10-CM

## 2022-12-21 LAB
ALBUMIN UR-MCNC: NEGATIVE MG/DL
ANION GAP SERPL CALCULATED.3IONS-SCNC: 4 MMOL/L (ref 3–14)
APPEARANCE UR: CLEAR
BACTERIA #/AREA URNS HPF: ABNORMAL /HPF
BASOPHILS # BLD AUTO: 0 10E3/UL (ref 0–0.2)
BASOPHILS NFR BLD AUTO: 0 %
BILIRUB UR QL STRIP: NEGATIVE
BUN SERPL-MCNC: 18 MG/DL (ref 7–30)
CALCIUM SERPL-MCNC: 9 MG/DL (ref 8.5–10.1)
CHLORIDE BLD-SCNC: 108 MMOL/L (ref 94–109)
CO2 SERPL-SCNC: 29 MMOL/L (ref 20–32)
COLOR UR AUTO: ABNORMAL
CREAT SERPL-MCNC: 1.15 MG/DL (ref 0.52–1.04)
CRP SERPL-MCNC: 15.9 MG/L (ref 0–8)
EOSINOPHIL # BLD AUTO: 0.1 10E3/UL (ref 0–0.7)
EOSINOPHIL NFR BLD AUTO: 1 %
ERYTHROCYTE [DISTWIDTH] IN BLOOD BY AUTOMATED COUNT: 13.6 % (ref 10–15)
ERYTHROCYTE [SEDIMENTATION RATE] IN BLOOD BY WESTERGREN METHOD: 27 MM/HR (ref 0–30)
GFR SERPL CREATININE-BSD FRML MDRD: 57 ML/MIN/1.73M2
GLUCOSE BLD-MCNC: 93 MG/DL (ref 70–99)
GLUCOSE BLDC GLUCOMTR-MCNC: 95 MG/DL (ref 70–99)
GLUCOSE UR STRIP-MCNC: NEGATIVE MG/DL
HCT VFR BLD AUTO: 42.9 % (ref 35–47)
HGB BLD-MCNC: 13.8 G/DL (ref 11.7–15.7)
HGB UR QL STRIP: ABNORMAL
IMM GRANULOCYTES # BLD: 0 10E3/UL
IMM GRANULOCYTES NFR BLD: 0 %
KETONES UR STRIP-MCNC: NEGATIVE MG/DL
LEUKOCYTE ESTERASE UR QL STRIP: NEGATIVE
LYMPHOCYTES # BLD AUTO: 2.7 10E3/UL (ref 0.8–5.3)
LYMPHOCYTES NFR BLD AUTO: 29 %
MCH RBC QN AUTO: 28.1 PG (ref 26.5–33)
MCHC RBC AUTO-ENTMCNC: 32.2 G/DL (ref 31.5–36.5)
MCV RBC AUTO: 87 FL (ref 78–100)
MONOCYTES # BLD AUTO: 0.6 10E3/UL (ref 0–1.3)
MONOCYTES NFR BLD AUTO: 6 %
NEUTROPHILS # BLD AUTO: 5.8 10E3/UL (ref 1.6–8.3)
NEUTROPHILS NFR BLD AUTO: 64 %
NITRATE UR QL: NEGATIVE
NRBC # BLD AUTO: 0 10E3/UL
NRBC BLD AUTO-RTO: 0 /100
PH UR STRIP: 6 [PH] (ref 5–7)
PLATELET # BLD AUTO: 393 10E3/UL (ref 150–450)
POTASSIUM BLD-SCNC: 3.8 MMOL/L (ref 3.4–5.3)
RBC # BLD AUTO: 4.91 10E6/UL (ref 3.8–5.2)
RBC URINE: 1 /HPF
SODIUM SERPL-SCNC: 141 MMOL/L (ref 133–144)
SP GR UR STRIP: 1.01 (ref 1–1.03)
SQUAMOUS EPITHELIAL: 1 /HPF
TROPONIN I SERPL HS-MCNC: 7 NG/L
UROBILINOGEN UR STRIP-MCNC: NORMAL MG/DL
WBC # BLD AUTO: 9.3 10E3/UL (ref 4–11)
WBC URINE: <1 /HPF

## 2022-12-21 PROCEDURE — 250N000013 HC RX MED GY IP 250 OP 250 PS 637: Performed by: EMERGENCY MEDICINE

## 2022-12-21 PROCEDURE — 96375 TX/PRO/DX INJ NEW DRUG ADDON: CPT | Performed by: EMERGENCY MEDICINE

## 2022-12-21 PROCEDURE — A9585 GADOBUTROL INJECTION: HCPCS | Performed by: EMERGENCY MEDICINE

## 2022-12-21 PROCEDURE — 86140 C-REACTIVE PROTEIN: CPT | Performed by: EMERGENCY MEDICINE

## 2022-12-21 PROCEDURE — 81001 URINALYSIS AUTO W/SCOPE: CPT | Performed by: EMERGENCY MEDICINE

## 2022-12-21 PROCEDURE — 84484 ASSAY OF TROPONIN QUANT: CPT | Performed by: EMERGENCY MEDICINE

## 2022-12-21 PROCEDURE — 93005 ELECTROCARDIOGRAM TRACING: CPT | Performed by: EMERGENCY MEDICINE

## 2022-12-21 PROCEDURE — 255N000002 HC RX 255 OP 636: Performed by: EMERGENCY MEDICINE

## 2022-12-21 PROCEDURE — 85025 COMPLETE CBC W/AUTO DIFF WBC: CPT | Performed by: EMERGENCY MEDICINE

## 2022-12-21 PROCEDURE — 85652 RBC SED RATE AUTOMATED: CPT | Performed by: EMERGENCY MEDICINE

## 2022-12-21 PROCEDURE — 93010 ELECTROCARDIOGRAM REPORT: CPT | Performed by: EMERGENCY MEDICINE

## 2022-12-21 PROCEDURE — 99285 EMERGENCY DEPT VISIT HI MDM: CPT | Mod: 25 | Performed by: EMERGENCY MEDICINE

## 2022-12-21 PROCEDURE — 250N000011 HC RX IP 250 OP 636: Performed by: EMERGENCY MEDICINE

## 2022-12-21 PROCEDURE — 36415 COLL VENOUS BLD VENIPUNCTURE: CPT | Performed by: EMERGENCY MEDICINE

## 2022-12-21 PROCEDURE — 96365 THER/PROPH/DIAG IV INF INIT: CPT | Performed by: EMERGENCY MEDICINE

## 2022-12-21 PROCEDURE — 70553 MRI BRAIN STEM W/O & W/DYE: CPT

## 2022-12-21 PROCEDURE — 80048 BASIC METABOLIC PNL TOTAL CA: CPT | Performed by: EMERGENCY MEDICINE

## 2022-12-21 RX ORDER — ACETAMINOPHEN 325 MG/1
975 TABLET ORAL ONCE
Status: COMPLETED | OUTPATIENT
Start: 2022-12-21 | End: 2022-12-21

## 2022-12-21 RX ORDER — MAGNESIUM SULFATE 1 G/100ML
1 INJECTION INTRAVENOUS ONCE
Status: COMPLETED | OUTPATIENT
Start: 2022-12-21 | End: 2022-12-21

## 2022-12-21 RX ORDER — DEXAMETHASONE SODIUM PHOSPHATE 10 MG/ML
10 INJECTION, SOLUTION INTRAMUSCULAR; INTRAVENOUS ONCE
Status: COMPLETED | OUTPATIENT
Start: 2022-12-21 | End: 2022-12-21

## 2022-12-21 RX ORDER — OMEPRAZOLE 20 MG/1
40 TABLET, DELAYED RELEASE ORAL DAILY
COMMUNITY

## 2022-12-21 RX ORDER — GADOBUTROL 604.72 MG/ML
10 INJECTION INTRAVENOUS ONCE
Status: COMPLETED | OUTPATIENT
Start: 2022-12-21 | End: 2022-12-21

## 2022-12-21 RX ORDER — ONDANSETRON 2 MG/ML
4 INJECTION INTRAMUSCULAR; INTRAVENOUS EVERY 30 MIN PRN
Status: DISCONTINUED | OUTPATIENT
Start: 2022-12-21 | End: 2022-12-21 | Stop reason: HOSPADM

## 2022-12-21 RX ADMIN — ONDANSETRON 4 MG: 2 INJECTION INTRAMUSCULAR; INTRAVENOUS at 19:55

## 2022-12-21 RX ADMIN — DEXAMETHASONE SODIUM PHOSPHATE 10 MG: 10 INJECTION, SOLUTION INTRAMUSCULAR; INTRAVENOUS at 19:57

## 2022-12-21 RX ADMIN — GADOBUTROL 10 ML: 604.72 INJECTION INTRAVENOUS at 18:35

## 2022-12-21 RX ADMIN — ACETAMINOPHEN 975 MG: 325 TABLET, FILM COATED ORAL at 19:55

## 2022-12-21 RX ADMIN — MAGNESIUM SULFATE HEPTAHYDRATE 1 G: 1 INJECTION, SOLUTION INTRAVENOUS at 20:00

## 2022-12-21 ASSESSMENT — ACTIVITIES OF DAILY LIVING (ADL)
ADLS_ACUITY_SCORE: 37
ADLS_ACUITY_SCORE: 37

## 2022-12-21 NOTE — TELEPHONE ENCOUNTER
Friend calling in with concerns for patient. No [facility] on file. Friend stated she is unsure if patient goes here but assumes patient goes here because patient lives in [place]. 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 Friend stated she had a gut feeling that patient is confused. Patient's daughter was present but there is also no C2C on file.    RN informed friend that I cannot speak with her or daughter and really encouraged them to reach out to patient and have patient call her clinic if needed. RN advised them if they are really concerned for patient as well they can try to see if patient will go in to be seen. They agreed.     Closing this encounter.     ANSLEY GiraldoN, RN

## 2022-12-21 NOTE — ED TRIAGE NOTES
Brought in by family members stating she has a headache that started this AM. Family states she has memory loss over a series of events that happened today.

## 2022-12-22 NOTE — DISCHARGE INSTRUCTIONS
Your MRI did not show any sign of stroke or brain bleed    Your blood work and EKG did not show any acute worrisome findings to explain your symptoms.    This seems consistent with transient global amnesia, which is something that you have experienced before.  Typically clears in 24 hours.    If you are able to be monitored at home by friend or family member, so they can monitor for any change or worsening of your symptoms, it is okay to go home.  If symptoms do not improve after 48 hours, or get worse, you should return promptly to the ER for evaluation    Follow-up in clinic with neurology as needed.  May also follow-up with your primary doctor in clinic as needed.

## 2022-12-22 NOTE — TELEPHONE ENCOUNTER
Called by hospitalist about this patient presenting with confusion.  Started around 830 this morning.  Repeating herself over and over.  Has no recollection for the day.  Seems to be improving slowly now and knows she is in hospital in [place] but does not know events of today still.  Seems to be repeating herself less.  Family would like to take her home and ED provider states she has good support system.  MRI was negative.    She does have a history of what is described as transient global amnesia in 2020.  This can recur in a minority of patients.  Suspect this may be what was going on here.  Giving her history of temporal lobectomy, transient epileptic amnesia would be another consideration.  If family feels comfortable taking her home I think this is reasonable as long as they are able to monitor her 24/7 until she returns to normal.  Suspect if this is transient global amnesia it will resolve over the course of 24 hours as it did previously.  If it is persistent should present back to the ED.  Patient should follow-up with neurology and get an EEG as an outpatient.    Tamika Freire, DO  Neurology

## 2022-12-22 NOTE — ED PROVIDER NOTES
"  History     Chief Complaint   Patient presents with     Headache     Memory issues     HPI  Sailaja Mcgee is a 52 year old female who presents to the emergency room for concern of headache and memory loss.  She has a history of transient global amnesia, also has history of seizure disorder and had subsequent \"scar tissue removal surgery\", but is no longer on antiepileptic medication.  She says that she does not remember much, knows her age, current location, and month.  She says she cannot remember anything today.  She is accompanied by a roommate, who states that her  was contacted earlier but he does drive truck and is currently over the road.  Additionally, family member is present.  Family member spoke with Naima around 730 this morning via telephone and everything was normal.  Roommate had observed her throughout the day.  Suspect that around 830 to 9:30 AM today that she started to have some confusion and signs of memory loss.  Is also complaining of headache and she does not frequently get headaches.  Has not fallen or had any head trauma.  Denies any slurred speech, weakness, numbness or tingling on one side of her body.  This has happened before, several years ago she had an episode almost identical to this, and was diagnosed with transient global amnesia.  Her brain surgery was back in 2002, and after that she did not need any ongoing antiepileptic medications.  She has not had any evidence of seizure activity today per friend or family member.  She is denying any neck pain, has not had any fever or upper respiratory symptoms, no chest pain.    Allergies:  Allergies   Allergen Reactions     Aspirin Rash     Rash on face       Problem List:    Patient Active Problem List    Diagnosis Date Noted     Bilateral carpal tunnel syndrome 03/03/2022     Priority: Medium     Ovarian tumor of borderline malignancy, left 02/24/2022     Priority: Medium     History of seizures 02/18/2020     Priority: Medium "     Transient global amnesia 02/16/2020     Priority: Medium     S/P BSO (bilateral salpingo-oophorectomy) 07/15/2019     Priority: Medium     Right lower quadrant abdominal mass 06/25/2019     Priority: Medium     CKD (chronic kidney disease) stage 3, GFR 30-59 ml/min (H) 06/11/2019     Priority: Medium     Obesity (BMI 35.0-39.9) with comorbidity (H) 06/10/2019     Priority: Medium     Anemia 01/11/2014     Priority: Medium     Donor of kidney for transplant 01/09/2014     Priority: Medium     S/P laparoscopic hysterectomy 10/24/2011     Priority: Medium     For menorrhagia.   Cervix and uterus removed.          Dysmenorrhea 09/28/2011     Priority: Medium     Menorrhagia 09/28/2011     Priority: Medium     CARDIOVASCULAR SCREENING; LDL GOAL LESS THAN 160 10/31/2010     Priority: Medium     GERD (gastroesophageal reflux disease) 01/11/2010     Priority: Medium        Past Medical History:    Past Medical History:   Diagnosis Date     CKD (chronic kidney disease)      Gastroesophageal reflux disease      History of blood transfusion      Ovarian tumor of borderline malignancy, left      Seizure (H) 2002     Solitary kidney 04/2009       Past Surgical History:    Past Surgical History:   Procedure Laterality Date     COLONOSCOPY N/A 7/10/2019    Procedure: COLONOSCOPY;  Surgeon: Sheba Tenorio MD;  [facility]     CYSTOSCOPY  10/24/2011    Procedure:CYSTOSCOPY; Surgeon:NAYELY MADRIGAL; Location:[facility]     CYSTOSCOPY N/A 5/16/2022    Procedure: cystoscopy,;  Surgeon: Ivy Soto MD;  Location: [facility]     CYSTOSCOPY  5/16/2022    Procedure: ;  Surgeon: Ivy Soto MD;  Location: [facility]     EXAM UNDER ANESTHESIA, FISTULOTOMY RECTUM, COMBINED N/A 4/22/2022    Procedure: Exam under anesthesia, drainage of abscess and placement of seton;  Surgeon: Pham Winn MD;  Location: [facility]     EXAM UNDER ANESTHESIA, FISTULOTOMY RECTUM, COMBINED  4/22/2022    Procedure: ;  Surgeon: Pham Winn,  MD;  Location: [facility] OR     HC BRAIN MAPPING, 1ST HOUR MD ATTENDANCE  2001    ablation of seizure focus     HC REDUCTION OF LARGE BREAST  2001     HYSTERECTOMY TOTAL ABD, [place] SALPINGO-OOPHORECTOMY, NODE DISSECTION, TUMOR DEBULKING, COMBINED Bilateral 7/15/2019    Procedure: Exploratory Laparotomy, Total Abdominal Hysterectomy, Removal Of Both Tubes And Ovaries, Omentectomy, Appendectomy, Peritoneal Biopsies;  Surgeon: Ivy Soto MD;  Location: [facility]     HYSTERECTOMY VAGINAL  10/24/2011    Procedure:HYSTERECTOMY VAGINAL; vaginal hysterectomy and cystoscopy; Surgeon:NAYELY MADRIGAL; Location:[facility]     HYSTEROSCOPY,ABLATION ENDOMETRIUM  2001     VAGINECTOMY, ROBOT-ASSISTED N/A 5/16/2022    Procedure: Robotic assisted radical upper vaginectomy, pelvic mass excision,;  Surgeon: Ivy Soto MD;  Location: [facility] OR     [facility] LAP,DONOR KIDNEY REMOV,LIVING  4/2/2009    Left laparoscopic donor nephrectomy.  Donating to brother. [facility].       Family History:    Family History   Problem Relation Age of Onset     Cancer Paternal Grandfather         colon cancer     Diabetes Maternal Grandmother      Cerebrovascular Disease Maternal Grandmother      Arthritis Maternal Grandmother      Arthritis Mother      Cardiovascular Maternal Grandfather         heart attack x2     Heart Disease Maternal Grandfather         heart attack x2     Gastrointestinal Disease Paternal Grandmother         liver failure     Genitourinary Problems Brother         kidney failure     Cancer Father         lung cancer diagnosed 7/11 due to chemical exposure war     Cancer Maternal Aunt      Asthma No family hx of      C.A.D. No family hx of      Hypertension No family hx of      Breast Cancer No family hx of      Cancer - colorectal No family hx of      Prostate Cancer No family hx of      Alzheimer Disease No family hx of      Blood Disease No family hx of      Circulatory No family hx of      Eye Disorder No family hx of      Lipids No family  "hx of      Musculoskeletal Disorder No family hx of      Neurologic Disorder No family hx of      Respiratory No family hx of      Thyroid Disease No family hx of        Social History:  Marital Status:   [2]  Social History     Tobacco Use     Smoking status: Never     Smokeless tobacco: Never     Tobacco comments:     no smokers in the household   Vaping Use     Vaping Use: Never used   Substance Use Topics     Alcohol use: Yes     Comment: rarely     Drug use: No        Medications:    Multiple Vitamins-Minerals (MULTIVITAMIN ADULT) CHEW  omeprazole (PRILOSEC OTC) 20 MG EC tablet  senna-docusate (SENOKOT-S/PERICOLACE) 8.6-50 MG tablet  amoxicillin-clavulanate (AUGMENTIN) 875-125 MG tablet          Review of Systems   All other systems reviewed and are negative.      Physical Exam   BP: (!) 167/105  Pulse: 97  Temp: 98  F (36.7  C)  Resp: 20  Height: 165.1 cm (5' 5\")  SpO2: 99 %      Physical Exam  Vitals and nursing note reviewed.   Constitutional:       General: She is not in acute distress.     Appearance: She is obese. She is not diaphoretic.   HENT:      Head: Normocephalic and atraumatic.      Mouth/Throat:      Pharynx: No oropharyngeal exudate.   Eyes:      General: No scleral icterus.     Extraocular Movements: Extraocular movements intact.      Conjunctiva/sclera: Conjunctivae normal.      Pupils: Pupils are equal, round, and reactive to light.   Cardiovascular:      Rate and Rhythm: Normal rate and regular rhythm.      Heart sounds: Normal heart sounds.   Pulmonary:      Effort: Pulmonary effort is normal. No respiratory distress.      Breath sounds: Normal breath sounds.   Abdominal:      General: Bowel sounds are normal.      Palpations: Abdomen is soft.      Tenderness: There is no abdominal tenderness.   Musculoskeletal:         General: No tenderness.      Cervical back: Normal range of motion and neck supple.   Skin:     General: Skin is warm.      Findings: No rash.   Neurological:      " Mental Status: She is alert.      Cranial Nerves: Cranial nerves 2-12 are intact. No dysarthria.      Motor: Motor function is intact.         ED Course                 Procedures              EKG Interpretation:      Interpreted by Tali Baird DO  Time reviewed: 1800  Symptoms at time of EKG: Headache, memory loss  Rhythm: normal sinus   Rate: normal  Axis: normal  Ectopy: none  Conduction: normal  ST Segments/ T Waves: No ST-T wave changes  Q Waves: none  Comparison to prior: Unchanged    Clinical Impression: normal EKG          Critical Care time:  none               Results for orders placed or performed during the hospital encounter of 12/21/22 (from the past 24 hour(s))   Glucose by meter   Result Value Ref Range    GLUCOSE BY METER POCT 95 70 - 99 mg/dL   CBC with platelets differential    Narrative    The following orders were created for panel order CBC with platelets differential.  Procedure                               Abnormality         Status                     ---------                               -----------         ------                     CBC with platelets and d...[[id number]]                      Final result                 Please view results for these tests on the individual orders.   Basic metabolic panel   Result Value Ref Range    Sodium 141 133 - 144 mmol/L    Potassium 3.8 3.4 - 5.3 mmol/L    Chloride 108 94 - 109 mmol/L    Carbon Dioxide (CO2) 29 20 - 32 mmol/L    Anion Gap 4 3 - 14 mmol/L    Urea Nitrogen 18 7 - 30 mg/dL    Creatinine 1.15 (H) 0.52 - 1.04 mg/dL    Calcium 9.0 8.5 - 10.1 mg/dL    Glucose 93 70 - 99 mg/dL    GFR Estimate 57 (L) >60 mL/min/1.73m2   Troponin I   Result Value Ref Range    Troponin I High Sensitivity 7 <54 ng/L   CRP inflammation   Result Value Ref Range    CRP Inflammation 15.9 (H) 0.0 - 8.0 mg/L   Erythrocyte sedimentation rate auto   Result Value Ref Range    Erythrocyte Sedimentation Rate 27 0 - 30 mm/hr   CBC with platelets and differential    Result Value Ref Range    WBC Count 9.3 4.0 - 11.0 10e3/uL    RBC Count 4.91 3.80 - 5.20 10e6/uL    Hemoglobin 13.8 11.7 - 15.7 g/dL    Hematocrit 42.9 35.0 - 47.0 %    MCV 87 78 - 100 fL    MCH 28.1 26.5 - 33.0 pg    MCHC 32.2 31.5 - 36.5 g/dL    RDW 13.6 10.0 - 15.0 %    Platelet Count 393 150 - 450 10e3/uL    % Neutrophils 64 %    % Lymphocytes 29 %    % Monocytes 6 %    % Eosinophils 1 %    % Basophils 0 %    % Immature Granulocytes 0 %    NRBCs per 100 WBC 0 <1 /100    Absolute Neutrophils 5.8 1.6 - 8.3 10e3/uL    Absolute Lymphocytes 2.7 0.8 - 5.3 10e3/uL    Absolute Monocytes 0.6 0.0 - 1.3 10e3/uL    Absolute Eosinophils 0.1 0.0 - 0.7 10e3/uL    Absolute Basophils 0.0 0.0 - 0.2 10e3/uL    Absolute Immature Granulocytes 0.0 <=0.4 10e3/uL    Absolute NRBCs 0.0 10e3/uL   MR Brain w/o & w Contrast    Narrative    EXAM: MR BRAIN W/O and W CONTRAST  LOCATION: [facility]  DATE/TIME: 12/21/2022 6:57 PM    INDICATION: Headache; Acute HA (< 3 months), no complicating features  COMPARISON: Head MRI 2/17/2020  CONTRAST: 10 mL Gadavist  TECHNIQUE: Routine multiplanar multisequence head MRI without and with intravenous contrast.    FINDINGS:  Chronic postoperative changes of prior left anterior and temporal lobe resection, unchanged. Thin marginal T2 hyperintense signal surrounding the resection cavity presumedly represents postoperative gliosis, not significantly changed.    Small presumed benign cystic lesion in the expected location the pineal gland, probably benign, unchanged. Parenchyma is otherwise within normal limits for age. No evidence of acute ischemia, hemorrhage, mass effect, or hydrocephalus. No concerning   enhancement or diffusion restriction.    Marrow signal is otherwise within normal limits. Trace paranasal sinus mucosal thickening. The visualized tympanic and mastoid cavities are unremarkable.      Impression    IMPRESSION:  1.  No acute abnormality.  2.  Chronic  postoperative changes of prior left anterior and temporal lobe resection, unchanged.   UA with Microscopic reflex to Culture    Specimen: Urine, NOS   Result Value Ref Range    Color Urine Straw Colorless, Straw, Light Yellow, Yellow    Appearance Urine Clear Clear    Glucose Urine Negative Negative mg/dL    Bilirubin Urine Negative Negative    Ketones Urine Negative Negative mg/dL    Specific Gravity Urine 1.015 1.003 - 1.035    Blood Urine Trace (A) Negative    pH Urine 6.0 5.0 - 7.0    Protein Albumin Urine Negative Negative mg/dL    Urobilinogen Urine Normal Normal, 2.0 mg/dL    Nitrite Urine Negative Negative    Leukocyte Esterase Urine Negative Negative    Bacteria Urine Few (A) None Seen /HPF    RBC Urine 1 <=2 /HPF    WBC Urine <1 <=5 /HPF    Squamous Epithelials Urine 1 <=1 /HPF    Narrative    Urine Culture not indicated       Medications   ondansetron (ZOFRAN) injection 4 mg (4 mg Intravenous Given 12/21/22 1955)   gadobutrol (GADAVIST) injection 10 mL (10 mLs Intravenous Given 12/21/22 1835)   dexamethasone PF (DECADRON) injection 10 mg (10 mg Intravenous Given 12/21/22 1957)   magnesium sulfate 1 gm in 100mL D5W intermittent infusion (0 g Intravenous Stopped 12/21/22 2035)   acetaminophen (TYLENOL) tablet 975 mg (975 mg Oral Given 12/21/22 1955)       Assessments & Plan (with Medical Decision Making)  Ilene is a 52-year-old female presenting to the emergency room with family over concerns of memory issues and headache.  See history of focused physical exam as above  Pleasant 52-year-old female in no acute distress, is hypertensive but otherwise vitally stable and afebrile.  No focal deficits are appreciated on exam, does not have unilateral weakness or sensory deficits, does not have any facial droop, is not exhibiting any dysphagia dysarthria, or aphasia.  Will proceed directly to MRI since her onset of symptoms is outside the 4 and half hour window to qualify for tPA, and that this seems atypical for  stroke presentation.  Suspect another episode of transient global amnesia, but will get MRI for evaluation  Labs and imaging as above.  No acute worrisome findings to explain the patient's symptoms.  Seems consistent with transient global amnesia.  Called and spoke with Dr. Freire, general neurology on-call, who agreed with the work-up and recommendations for transfer home versus monitoring here in the ED or at this facility for 24 hours for clearance.  Did not think that patient required additional work-up, transfer to another facility or higher level of care for additional valuation.  Naima was still complaining of headache but otherwise did not develop any focal neurologic deficits that would indicate acute stroke or other acute emergent medical process.  Discussed the findings with the family, will treat headache with migraine cocktail since she has had a negative MRI and is still complaining of headache  Migraine cocktail improved headache.  Patient feels better.  Is still stable and stating that she does not have any memory of what is going on.  Her roommate who is still at the bedside feels comfortable with plan for taking her home and monitoring.  He was around for the previous episode dealing with the transient global amnesia, and knows what to expect and monitor for.  Given instructions to monitor for the next 24 to 48 hours.  If no improvement return promptly to the ER.  Also return if any slurred speech facial droop, weakness, ataxia, or other acute concerns.  All questions were answered and discharged in no distress     I have reviewed the nursing notes.    I have reviewed the findings, diagnosis, plan and need for follow up with the patient.       New Prescriptions    No medications on file       Final diagnoses:   Transient global amnesia   Tension headache       12/21/2022   [facility] EMERGENCY DEPT     Tali Baird DO  12/21/22 [id number]

## 2022-12-27 ENCOUNTER — OFFICE VISIT (OUTPATIENT)
Dept: FAMILY MEDICINE | Facility: OTHER | Age: 52
End: 2022-12-27
Payer: COMMERCIAL

## 2022-12-27 VITALS
HEART RATE: 91 BPM | DIASTOLIC BLOOD PRESSURE: 78 MMHG | SYSTOLIC BLOOD PRESSURE: 118 MMHG | OXYGEN SATURATION: 97 % | WEIGHT: 239 LBS | HEIGHT: 65 IN | TEMPERATURE: 98.4 F | RESPIRATION RATE: 14 BRPM | BODY MASS INDEX: 39.82 KG/M2

## 2022-12-27 DIAGNOSIS — N18.30 STAGE 3 CHRONIC KIDNEY DISEASE, UNSPECIFIED WHETHER STAGE 3A OR 3B CKD (H): ICD-10-CM

## 2022-12-27 DIAGNOSIS — D39.12 OVARIAN TUMOR OF BORDERLINE MALIGNANCY, LEFT: ICD-10-CM

## 2022-12-27 DIAGNOSIS — G45.4 TRANSIENT GLOBAL AMNESIA: ICD-10-CM

## 2022-12-27 DIAGNOSIS — Z01.818 PREOP GENERAL PHYSICAL EXAM: Primary | ICD-10-CM

## 2022-12-27 DIAGNOSIS — G43.009 MIGRAINE WITHOUT AURA AND WITHOUT STATUS MIGRAINOSUS, NOT INTRACTABLE: ICD-10-CM

## 2022-12-27 DIAGNOSIS — K60.30 ANAL FISTULA: ICD-10-CM

## 2022-12-27 LAB
ANION GAP SERPL CALCULATED.3IONS-SCNC: 5 MMOL/L (ref 3–14)
BUN SERPL-MCNC: 22 MG/DL (ref 7–30)
CALCIUM SERPL-MCNC: 9 MG/DL (ref 8.5–10.1)
CHLORIDE BLD-SCNC: 107 MMOL/L (ref 94–109)
CO2 SERPL-SCNC: 29 MMOL/L (ref 20–32)
CREAT SERPL-MCNC: 1.15 MG/DL (ref 0.52–1.04)
ERYTHROCYTE [DISTWIDTH] IN BLOOD BY AUTOMATED COUNT: 14.3 % (ref 10–15)
GFR SERPL CREATININE-BSD FRML MDRD: 57 ML/MIN/1.73M2
GLUCOSE BLD-MCNC: 164 MG/DL (ref 70–99)
HCT VFR BLD AUTO: 42.8 % (ref 35–47)
HGB BLD-MCNC: 13.6 G/DL (ref 11.7–15.7)
MCH RBC QN AUTO: 28.2 PG (ref 26.5–33)
MCHC RBC AUTO-ENTMCNC: 31.8 G/DL (ref 31.5–36.5)
MCV RBC AUTO: 89 FL (ref 78–100)
PLATELET # BLD AUTO: 344 10E3/UL (ref 150–450)
POTASSIUM BLD-SCNC: 3.7 MMOL/L (ref 3.4–5.3)
RBC # BLD AUTO: 4.83 10E6/UL (ref 3.8–5.2)
SODIUM SERPL-SCNC: 141 MMOL/L (ref 133–144)
WBC # BLD AUTO: 8.3 10E3/UL (ref 4–11)

## 2022-12-27 PROCEDURE — 80048 BASIC METABOLIC PNL TOTAL CA: CPT | Performed by: PHYSICIAN ASSISTANT

## 2022-12-27 PROCEDURE — 96372 THER/PROPH/DIAG INJ SC/IM: CPT | Performed by: PHYSICIAN ASSISTANT

## 2022-12-27 PROCEDURE — 99214 OFFICE O/P EST MOD 30 MIN: CPT | Mod: 25 | Performed by: PHYSICIAN ASSISTANT

## 2022-12-27 PROCEDURE — 36415 COLL VENOUS BLD VENIPUNCTURE: CPT | Performed by: PHYSICIAN ASSISTANT

## 2022-12-27 PROCEDURE — 85027 COMPLETE CBC AUTOMATED: CPT | Performed by: PHYSICIAN ASSISTANT

## 2022-12-27 RX ORDER — KETOROLAC TROMETHAMINE 30 MG/ML
60 INJECTION, SOLUTION INTRAMUSCULAR; INTRAVENOUS ONCE
Status: COMPLETED | OUTPATIENT
Start: 2022-12-27 | End: 2022-12-27

## 2022-12-27 RX ORDER — ONDANSETRON 4 MG/1
4 TABLET, ORALLY DISINTEGRATING ORAL ONCE
Status: COMPLETED | OUTPATIENT
Start: 2022-12-27 | End: 2022-12-27

## 2022-12-27 RX ADMIN — ONDANSETRON 4 MG: 4 TABLET, ORALLY DISINTEGRATING ORAL at 11:52

## 2022-12-27 RX ADMIN — KETOROLAC TROMETHAMINE 60 MG: 30 INJECTION, SOLUTION INTRAMUSCULAR; INTRAVENOUS at 11:50

## 2022-12-27 ASSESSMENT — PAIN SCALES - GENERAL: PAINLEVEL: NO PAIN (0)

## 2022-12-27 NOTE — PATIENT INSTRUCTIONS
For informational purposes only. Not to replace the advice of your health care provider. Copyright   2003,  [facility]. All rights reserved. Clinically reviewed by Estefanía Naranjo MD. [employer] [id number] - REV .  Preparing for Your Surgery  Getting started  A nurse will call you to review your health history and instructions. They will give you an arrival time based on your scheduled surgery time. Please be ready to share:    Your doctor's clinic name and phone number    Your medical, surgical, and anesthesia history    A list of allergies and sensitivities    A list of medicines, including herbal treatments and over-the-counter drugs    Whether the patient has a legal guardian (ask how to send us the papers in advance)  Please tell us if you're pregnant--or if there's any chance you might be pregnant. Some surgeries may injure a fetus (unborn baby), so they require a pregnancy test. Surgeries that are safe for a fetus don't always need a test, and you can choose whether to have one.   If you have a child who's having surgery, please ask for a copy of Preparing for Your Child's Surgery.    Preparing for surgery    Within 10 to 30 days of surgery: Have a pre-op exam (sometimes called an H&P, or History and Physical). This can be done at a clinic or pre-operative center.  ? If you're having a , you may not need this exam. Talk to your care team.    At your pre-op exam, talk to your care team about all medicines you take. If you need to stop any medicines before surgery, ask when to start taking them again.  ? We do this for your safety. Many medicines can make you bleed too much during surgery. Some change how well surgery (anesthesia) drugs work.    Call your insurance company to let them know you're having surgery. (If you don't have insurance, call [phone].)    Call your clinic if there's any change in your health. This includes signs of a cold or flu (sore throat, runny nose,  cough, rash, fever). It also includes a scrape or scratch near the surgery site.    If you have questions on the day of surgery, call your hospital or surgery center.  Eating and drinking guidelines  For your safety: Unless your surgeon tells you otherwise, follow the guidelines below.    Eat and drink as usual until 8 hours before you arrive for surgery. After that, no food or milk.    Drink clear liquids until 2 hours before you arrive. These are liquids you can see through, like water, Gatorade, and Propel Water. They also include plain black coffee and tea (no cream or milk), candy, and breath mints. You can spit out gum when you arrive.    If you drink alcohol: Stop drinking it the night before surgery.    If your care team tells you to take medicine on the morning of surgery, it's okay to take it with a sip of water.  Preventing infection    Shower or bathe the night before and morning of your surgery. Follow the instructions your clinic gave you. (If no instructions, use regular soap.)    Don't shave or clip hair near your surgery site. We'll remove the hair if needed.    Don't smoke or vape the morning of surgery. You may chew nicotine gum up to 2 hours before surgery. A nicotine patch is okay.  ? Note: Some surgeries require you to completely quit smoking and nicotine. Check with your surgeon.    Your care team will make every effort to keep you safe from infection. We will:  ? Clean our hands often with soap and water (or an alcohol-based hand rub).  ? Clean the skin at your surgery site with a special soap that kills germs.  ? Give you a special gown to keep you warm. (Cold raises the risk of infection.)  ? Wear special hair covers, masks, gowns and gloves during surgery.  ? Give antibiotic medicine, if prescribed. Not all surgeries need antibiotics.  What to bring on the day of surgery    Photo ID and insurance card    Copy of your health care directive, if you have one    Glasses and hearing aids (bring  cases)  ? You can't wear contacts during surgery    Inhaler and eye drops, if you use them (tell us about these when you arrive)    CPAP machine or breathing device, if you use them    A few personal items, if spending the night    If you have . . .  ? A pacemaker, ICD (cardiac defibrillator) or other implant: Bring the ID card.  ? An implanted stimulator: Bring the remote control.  ? A legal guardian: Bring a copy of the certified (court-stamped) guardianship papers.  Please remove any jewelry, including body piercings. Leave jewelry and other valuables at home.  If you're going home the day of surgery    You must have a responsible adult drive you home. They should stay with you overnight as well.    If you don't have someone to stay with you, and you aren't safe to go home alone, we may keep you overnight. Insurance often won't pay for this.  After surgery  If it's hard to control your pain or you need more pain medicine, please call your surgeon's office.  Questions?   If you have any questions for your care team, list them here: _________________________________________________________________________________________________________________________________________________________________________ ____________________________________ ____________________________________ ____________________________________

## 2022-12-27 NOTE — PROGRESS NOTES
[address]  [facility] [address]  Phone: [phone]  Primary Provider: Suellen Millard  Pre-op Performing Provider: SUELLEN MILLARD    PREOPERATIVE EVALUATION:  Today's date: 12/27/2022    Sailaja Mcgee is a 52 year old female who presents for a preoperative evaluation.    Surgical Information:  Surgery/Procedure: Tube removal from rectum  Surgery Location: [place], [place]  Surgeon: Unknow  Surgery Date: 1/06/2023  Time of Surgery: unknown  Where patient plans to recover: At home with family  Fax number for surgical facility: patient will call us with information    Type of Anesthesia Anticipated: to be determined    Assessment & Plan     The proposed surgical procedure is considered INTERMEDIATE risk.    Preop general physical exam    Anal fistula    Transient global amnesia  MRI done in the ER showing no acute changes.   Given her history of this issue with migraine symptoms referral was placed to Neurology to schedule in the future.   - Adult Neurology  Referral; Future    Migraine without aura and without status migrainosus, not intractable  She will hold NSAIDs, does take ibuprofen for pain as needed and tolerated.  Given Toradol here in clinic with Zofran to see if this helps resolve her current headache. No neurologic deficits on examination today and her symptoms in general are improving. Follow-up with Neurology, sooner in clinic if getting worse or new concerns.   - Adult Neurology  Referral; Future  - ketorolac (TORADOL) injection 60 mg  - ondansetron (ZOFRAN ODT) ODT tab 4 mg    Stage 3 chronic kidney disease, unspecified whether stage 3a or 3b CKD (H)  Rechecking labs.   - Basic metabolic panel  (Ca, Cl, CO2, Creat, Gluc, K, Na, BUN); Future  - CBC with platelets; Future  - Basic metabolic panel  (Ca, Cl, CO2, Creat, Gluc, K, Na, BUN)  - CBC with platelets    Ovarian tumor of borderline malignancy, left             Risks and  Recommendations:  The patient has the following additional risks and recommendations for perioperative complications:   - No identified additional risk factors other than previously addressed    Medication Instructions:  Patient is to take all scheduled medications on the day of surgery    RECOMMENDATION:  APPROVAL GIVEN to proceed with proposed procedure, without further diagnostic evaluation.        Subjective     HPI related to upcoming procedure: She had an anal fistula earlier in the year and had a seton placed, has not had recurrent issues so far, occasional bleeding from the site, now going to have removed but has to follow postoperative guidelines and not work during the healing period.     Preop Questions 12/27/2022   1. Have you ever had a heart attack or stroke? No   2. Have you ever had surgery on your heart or blood vessels, such as a stent placement, a coronary artery bypass, or surgery on an artery in your head, neck, heart, or legs? No   3. Do you have chest pain with activity? No   4. Do you have a history of  heart failure? No   5. Do you currently have a cold, bronchitis or symptoms of other infection? No   6. Do you have a cough, shortness of breath, or wheezing? No   7. Do you or anyone in your family have previous history of blood clots? No   8. Do you or does anyone in your family have a serious bleeding problem such as prolonged bleeding following surgeries or cuts? No   9. Have you ever had problems with anemia or been told to take iron pills? No   10. Have you had any abnormal blood loss such as black, tarry or bloody stools, or abnormal vaginal bleeding? No   11. Have you ever had a blood transfusion? YES - Intraoperative bleeding needing transfusion years ago.    11a. Have you ever had a transfusion reaction? No   12. Are you willing to have a blood transfusion if it is medically needed before, during, or after your surgery? Yes   13. Have you or any of your relatives ever had problems  with anesthesia? No   14. Do you have sleep apnea, excessive snoring or daytime drowsiness? No   15. Do you have any artifical heart valves or other implanted medical devices like a pacemaker, defibrillator, or continuous glucose monitor? No   16. Do you have artificial joints? No   17. Are you allergic to latex? No   18. Is there any chance that you may be pregnant? No       Health Care Directive:  Patient does not have a Health Care Directive or Living Will: Discussed advance care planning with patient; information given to patient to review.    Preoperative Review of :   reviewed - No concerns.       Status of Chronic Conditions:  See problem list for active medical problems.  Problems all longstanding and stable, except as noted/documented.  See ROS for pertinent symptoms related to these conditions.    TRANSIENT GLOBAL AMNESIA + HEADACHE - recently seen in the ER for acute memory loss.  They did an MRI to evaluate and they found no acute concerns such as TIA or CVA.  She did have a headache as well at the time so they gave her medication which did help. She notes she continues to have left sided headache that is less severe but present.  She notes her memory is still a little sluggish but has improved. Had history of seizures back in her late 20's and had a procedure done and that is what resulted in the plate on the side of her head.  No recent head trauma. No other neurologic symptoms or deficits.     RENAL INSUFFICIENCY - Patient has a longstanding history of moderate-severe chronic renal insufficiency. Last Cr 1.15.       Review of Systems  Constitutional, neuro, ENT, endocrine, pulmonary, cardiac, gastrointestinal, genitourinary, musculoskeletal, integument and psychiatric systems are negative, except as otherwise noted.    Patient Active Problem List    Diagnosis Date Noted     Bilateral carpal tunnel syndrome 03/03/2022     Priority: Medium     Ovarian tumor of borderline malignancy, left  02/24/2022     Priority: Medium     History of seizures 02/18/2020     Priority: Medium     Transient global amnesia 02/16/2020     Priority: Medium     S/P BSO (bilateral salpingo-oophorectomy) 07/15/2019     Priority: Medium     Right lower quadrant abdominal mass 06/25/2019     Priority: Medium     CKD (chronic kidney disease) stage 3, GFR 30-59 ml/min (H) 06/11/2019     Priority: Medium     Obesity (BMI 35.0-39.9) with comorbidity (H) 06/10/2019     Priority: Medium     Anemia 01/11/2014     Priority: Medium     Donor of kidney for transplant 01/09/2014     Priority: Medium     S/P laparoscopic hysterectomy 10/24/2011     Priority: Medium     For menorrhagia.   Cervix and uterus removed.          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     Past Surgical History:   Procedure Laterality Date     COLONOSCOPY N/A 7/10/2019    Procedure: COLONOSCOPY;  Surgeon: Sheba Tenorio MD;  [facility]     CYSTOSCOPY  10/24/2011    Procedure:CYSTOSCOPY; Surgeon:NAYELY MADRIGAL; [facility]     CYSTOSCOPY N/A 5/16/2022    Procedure: cystoscopy,;  Surgeon: Ivy Soto MD;  [facility]     CYSTOSCOPY  5/16/2022    Procedure: ;  Surgeon: Ivy Soto MD;  [facility]     EXAM UNDER ANESTHESIA, FISTULOTOMY RECTUM, COMBINED N/A 4/22/2022    Procedure: Exam under anesthesia, drainage of abscess and placement of seton;  Surgeon: Pham Winn MD;  [facility]      EXAM UNDER ANESTHESIA, FISTULOTOMY RECTUM, COMBINED  4/22/2022    Procedure: ;  Surgeon: Pham Winn MD;  Location: [facility] OR     HC BRAIN MAPPING, 1ST HOUR MD ATTENDANCE  2001    ablation of seizure focus     HC REDUCTION OF LARGE BREAST  2001     HYSTERECTOMY TOTAL ABD, [place] SALPINGO-OOPHORECTOMY, NODE DISSECTION, TUMOR DEBULKING, COMBINED Bilateral 7/15/2019    Procedure: Exploratory Laparotomy, Total Abdominal Hysterectomy, Removal Of Both Tubes And Ovaries, Omentectomy, Appendectomy, Peritoneal Biopsies;  Surgeon: Ivy Soto MD;  [facility]     HYSTERECTOMY VAGINAL  10/24/2011    Procedure:HYSTERECTOMY VAGINAL; vaginal hysterectomy and cystoscopy; Surgeon:NAYELY MADRIGAL; [facility]     HYSTEROSCOPY,ABLATION ENDOMETRIUM  2001     VAGINECTOMY, ROBOT-ASSISTED N/A 5/16/2022    Procedure: Robotic assisted radical upper vaginectomy, pelvic mass excision,;  Surgeon: Ivy Soto MD;  [facility]     [facility]C LAP,DONOR KIDNEY REMOV,LIVING  4/2/2009    Left laparoscopic donor nephrectomy.  Donating to brother. [facility] [place].     Current Outpatient Medications   Medication Sig Dispense Refill     omeprazole (PRILOSEC OTC) 20 MG EC tablet Take 40 mg by mouth daily         Allergies   Allergen Reactions     Aspirin Rash     Rash on face        Social History     Tobacco Use     Smoking status: Never     Smokeless tobacco: Never     Tobacco comments:     no smokers in the household   Substance Use Topics     Alcohol use: Yes     Comment: rarely     Family History   Problem Relation Age of Onset     Cancer Paternal Grandfather         colon cancer     Diabetes Maternal Grandmother      Cerebrovascular Disease Maternal Grandmother      Arthritis Maternal Grandmother      Arthritis Mother      Cardiovascular Maternal Grandfather         heart attack x2     Heart Disease Maternal Grandfather         heart attack x2     Gastrointestinal Disease Paternal Grandmother         liver failure      "Genitourinary Problems Brother         kidney failure     Cancer Father         lung cancer diagnosed 7/11 due to chemical exposure war     Cancer Maternal Aunt      Asthma No family hx of      C.A.D. No family hx of      Hypertension No family hx of      Breast Cancer No family hx of      Cancer - colorectal No family hx of      Prostate Cancer No family hx of      Alzheimer Disease No family hx of      Blood Disease No family hx of      Circulatory No family hx of      Eye Disorder No family hx of      Lipids No family hx of      Musculoskeletal Disorder No family hx of      Neurologic Disorder No family hx of      Respiratory No family hx of      Thyroid Disease No family hx of      History   Drug Use No         Objective     /78   Pulse 91   Temp 98.4  F (36.9  C) (Temporal)   Resp 14   Ht 1.65 m (5' 4.96\")   Wt 108.4 kg (239 lb)   LMP 09/19/2011   SpO2 97%   BMI 39.82 kg/m      Physical Exam    GENERAL APPEARANCE: healthy, alert and no distress     EYES: EOMI, PERRL     HENT: ear canals and TM's normal and nose and mouth without ulcers or lesions     NECK: no adenopathy, no asymmetry, masses, or scars and thyroid normal to palpation     RESP: lungs clear to auscultation - no rales, rhonchi or wheezes     CV: regular rates and rhythm, normal S1 S2, no S3 or S4 and no murmur, click or rub     ABDOMEN:  soft, nontender, no HSM or masses and bowel sounds normal     MS: extremities normal- no gross deformities noted, no evidence of inflammation in joints, FROM in all extremities.     SKIN: no suspicious lesions or rashes     NEURO: Normal strength and tone, sensory exam grossly normal, mentation intact and speech normal     PSYCH: mentation appears normal. and affect normal/bright     LYMPHATICS: No cervical adenopathy    Recent Labs   Lab Test 12/21/22  1750 06/07/22  1113 04/19/22  1137 03/31/22  1230   HGB 13.8 13.1   < >  --     377   < >  --    INR  --   --   --  1.00    139   < >  -- "    POTASSIUM 3.8 3.7   < >  --    CR 1.15* 1.11*   < >  --     < > = values in this interval not displayed.        Diagnostics:  Recent Results (from the past 24 hour(s))   Basic metabolic panel  (Ca, Cl, CO2, Creat, Gluc, K, Na, BUN)    Collection Time: 12/27/22 11:32 AM   Result Value Ref Range    Sodium 141 133 - 144 mmol/L    Potassium 3.7 3.4 - 5.3 mmol/L    Chloride 107 94 - 109 mmol/L    Carbon Dioxide (CO2) 29 20 - 32 mmol/L    Anion Gap 5 3 - 14 mmol/L    Urea Nitrogen 22 7 - 30 mg/dL    Creatinine 1.15 (H) 0.52 - 1.04 mg/dL    Calcium 9.0 8.5 - 10.1 mg/dL    Glucose 164 (H) 70 - 99 mg/dL    GFR Estimate 57 (L) >60 mL/min/1.73m2   CBC with platelets    Collection Time: 12/27/22 11:32 AM   Result Value Ref Range    WBC Count 8.3 4.0 - 11.0 10e3/uL    RBC Count 4.83 3.80 - 5.20 10e6/uL    Hemoglobin 13.6 11.7 - 15.7 g/dL    Hematocrit 42.8 35.0 - 47.0 %    MCV 89 78 - 100 fL    MCH 28.2 26.5 - 33.0 pg    MCHC 31.8 31.5 - 36.5 g/dL    RDW 14.3 10.0 - 15.0 %    Platelet Count 344 150 - 450 10e3/uL      No EKG required, no history of coronary heart disease, significant arrhythmia, peripheral arterial disease or other structural heart disease.    Revised Cardiac Risk Index (RCRI):  The patient has the following serious cardiovascular risks for perioperative complications:   - No serious cardiac risks = 0 points     RCRI Interpretation: 0 points: Class I (very low risk - 0.4% complication rate)           Signed Electronically by: Suellen Millard PA-C  Copy of this evaluation report is provided to requesting physician.

## 2022-12-28 ENCOUNTER — TELEPHONE (OUTPATIENT)
Dept: FAMILY MEDICINE | Facility: OTHER | Age: 52
End: 2022-12-28

## 2022-12-28 NOTE — TELEPHONE ENCOUNTER
Patient could not find paperwork with surgery information. Called [facility] To confirm. Patient is scheduled with them for her procedure. Provided patient with address and phone number to surgery center.     Surgeon: Dr. Winn  Location: [address]  [address] [place] [address]  Date/Time: 1/6/2023, arrival time of 11:30am.      Ph: [phone]  Fax: [phone]

## 2022-12-28 NOTE — TELEPHONE ENCOUNTER
Spoke to patient over the phone and relayed information below. She states she is going to gather the pre-op  information needed and call us back. She has no further questions or concerns regarding labs at this time.      Alba Nazario, CMA

## 2023-03-13 NOTE — TELEPHONE ENCOUNTER
RECORDS RECEIVED FROM: Internal   REASON FOR VISIT: Transient global amnesia, Migraine without aura and without status migrainosus, not intractable    Date of Appt: 06/08/2023   NOTES (FOR ALL VISITS) STATUS DETAILS   OFFICE NOTE from referring provider Internal 12/27/2022 Dr Millard [facility]    OFFICE NOTE from other specialist N/A    DISCHARGE SUMMARY from hospital N/A    DISCHARGE REPORT from the ER Internal 12/21/2022 [facility] ED    OPERATIVE REPORT N/A    ANA Virus Labs (MS ONLY) N/A    EMG N/A    EEG N/A    MEDICATION LIST N/A    IMAGING  (FOR ALL VISITS)     LUMBAR PUNCTURE N/A    ASHLEY SCAN N/A    ULTRASOUND (CAROTID BILAT) *VASCULAR* N/A    MRI (HEAD, NECK, SPINE) Internal 12/21/2022 brain   02/16/2020 vrain    CT (HEAD, NECK, SPINE) Internal 02/16/2020 head, CTA head neck

## 2023-06-08 ENCOUNTER — PRE VISIT (OUTPATIENT)
Dept: NEUROLOGY | Facility: CLINIC | Age: 53
End: 2023-06-08

## 2023-06-21 ENCOUNTER — ONCOLOGY VISIT (OUTPATIENT)
Dept: ONCOLOGY | Facility: CLINIC | Age: 53
End: 2023-06-21
Attending: OBSTETRICS & GYNECOLOGY
Payer: COMMERCIAL

## 2023-06-21 VITALS
DIASTOLIC BLOOD PRESSURE: 82 MMHG | SYSTOLIC BLOOD PRESSURE: 126 MMHG | HEART RATE: 91 BPM | TEMPERATURE: 97.9 F | WEIGHT: 248.2 LBS | OXYGEN SATURATION: 99 % | BODY MASS INDEX: 41.35 KG/M2 | RESPIRATION RATE: 20 BRPM

## 2023-06-21 DIAGNOSIS — K62.5 RECTAL BLEEDING: ICD-10-CM

## 2023-06-21 DIAGNOSIS — K92.1 TARRY STOOLS: ICD-10-CM

## 2023-06-21 DIAGNOSIS — D39.12 OVARIAN TUMOR OF BORDERLINE MALIGNANCY, LEFT: Primary | ICD-10-CM

## 2023-06-21 PROCEDURE — 99213 OFFICE O/P EST LOW 20 MIN: CPT | Performed by: OBSTETRICS & GYNECOLOGY

## 2023-06-21 NOTE — PROGRESS NOTES
"Oncology Rooming Note    2023 4:02 PM   Sailaja Mcgee is a 53 year old female who presents for:    Chief Complaint   Patient presents with     Oncology Clinic Visit     Initial Vitals: /82 (BP Location: Left arm, Patient Position: Sitting, Cuff Size: Adult Large)   Pulse 91   Temp 97.9  F (36.6  C) (Oral)   Resp 20   Wt 112.6 kg (248 lb 3.2 oz)   LMP 2011   SpO2 99%   BMI 41.35 kg/m   Estimated body mass index is 41.35 kg/m  as calculated from the following:    Height as of 22: 1.65 m (5' 4.96\").    Weight as of this encounter: 112.6 kg (248 lb 3.2 oz). Body surface area is 2.27 meters squared.  Data Unavailable Comment: Data Unavailable   Patient's last menstrual period was 2011.  Allergies reviewed: Yes  Medications reviewed: Yes    Medications: Medication refills not needed today.  Pharmacy name entered into [employer]:    [facility] - [place], [place] - [address] [place] [address] PHARMACY [facility] - [place], [place] - [address]    Clinical concerns: NO      Yani Del Cid MD      GYN Oncology Follow Up Visit    Referring provider:    Bert Recinos MD  [address] [place] [address]   RE: Sailaja Mcgee  : 1970  ROGER: 2023       CC: Recurrent borderline mucinous ovarian tumor    HPI: Ms Sailaja Mcgee is a 53 year old year old female who presents for follow up of recurrent  borderline mucinous tumor       Treatment history:   19: MRI of back for back pain  19: CT scan showing large pelvic mass. Ca125 282, CEA 6.9,   7/15/19: XLap/BSO/Appy/Omx/Biopsies/CL. 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 Significant adhesive disease (previous left nephrectomy)  3/4/2022: Pelvic MRI (for rectal symptoms)   3/11/2022: CT C/A/P with complex cystic solid mass of low hemipelvis 5.4cm x 3.5cm. Stable lymphadenopathy.  12.   3/31/22: IR guided biopsy, benign (scant tissue)   2022: Pelvic exam (Gyn Onc " - Dr Ivy Soto) with vaginal polyp. Pathology shows mucinous cells, no atypia.   5/16/22: Robotic assisted radical upper vaginectomy, resection of pelvic mass, repair of cystotomy. FInal pathology with recurrent borderline mucinous tumor.     Today, patient reports she has been experiencing abdominal pain and lower back pain over the last month. She cannot think of anything that triggers her pain. Has no trouble with eating. Good energy. Drinking enough fluids. Having no bladder issues. She is reporting bright red bleeding with BMs and tarry stools. She has had this since her procedure with colorectal for her anal fistula, however it seems to have gotten worse recently. She denies any dizziness or SOB. No nausea or vomiting. Denies any new onset fatigue. Has not seen GI or any other providers for this problem. Patient also reports having two episodes of sharp pain that radiates from her head to her lower back. Happened before she got in the shower. The last time was 2 weeks ago. Does not have a history of migraines. Did not notice any weakness during those episodes.     Lastly, she has noticed a small raised bump in her pelvic region. She denies any itching, pain or bleeding. She puts Deoderant on it to keep it dry. Does not have these anywhere else on her body.     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       Past Surgical History:   Procedure Laterality Date     COLONOSCOPY N/A 7/10/2019    Procedure: COLONOSCOPY;  Surgeon: Sheba Tenorio MD;  [facility]     CYSTOSCOPY  10/24/2011    Procedure:CYSTOSCOPY; Surgeon:NAYELY MADRIGAL; [facility]     CYSTOSCOPY N/A 5/16/2022    Procedure: cystoscopy,;  Surgeon: Charles  Ivy LINDSAY MD;  [facility]     CYSTOSCOPY  2022    Procedure: ;  Surgeon: Ivy Soto MD;  [facility]     EXAM UNDER ANESTHESIA, FISTULOTOMY RECTUM, COMBINED N/A 2022    Procedure: Exam under anesthesia, drainage of abscess and placement of seton;  Surgeon: Pham Winn MD;  [facility]     EXAM UNDER ANESTHESIA, FISTULOTOMY RECTUM, COMBINED  2022    Procedure: ;  Surgeon: Pham Winn MD;  Location: [facility] OR     HC BRAIN MAPPING, 1ST HOUR MD ATTENDANCE      ablation of seizure focus     HC REDUCTION OF LARGE BREAST       HYSTERECTOMY TOTAL ABD, [place] SALPINGO-OOPHORECTOMY, NODE DISSECTION, TUMOR DEBULKING, COMBINED Bilateral 7/15/2019    Procedure: Exploratory Laparotomy, Total Abdominal Hysterectomy, Removal Of Both Tubes And Ovaries, Omentectomy, Appendectomy, Peritoneal Biopsies;  Surgeon: Ivy Soto MD;  [facility]     HYSTERECTOMY VAGINAL  10/24/2011    Procedure:HYSTERECTOMY VAGINAL; vaginal hysterectomy and cystoscopy; Surgeon:NAYELY MADRIGAL; Location[facility]     HYSTEROSCOPY,ABLATION ENDOMETRIUM       VAGINECTOMY, ROBOT-ASSISTED N/A 2022    Procedure: Robotic assisted radical upper vaginectomy, pelvic mass excision,;  Surgeon: Ivy Soto MD;  [facility]     [facility] LAP,DONOR KIDNEY REMOV,LIVING  2009    Left laparoscopic donor nephrectomy.  Donating to brother. [facility] [place].        OBGYN history and Health Maintenance:    Last Pap Smear: , NILM prior to hysterectomy  Last Mammogram:never  Last Colonoscopy: 2019 at [facility] prior to surgery, normal    Current Outpatient Medications   Medication Sig Dispense Refill     omeprazole (PRILOSEC OTC) 20 MG EC tablet Take 40 mg by mouth daily              Allergies   Allergen Reactions     Aspirin Rash     Rash on face        Social History:  Social History     Tobacco Use     Smoking status: Never     Smokeless tobacco: Never     Tobacco comments:     no smokers in the  household   Substance Use Topics     Alcohol use: Yes     Comment: rarely     Work: own [employer].  Ethnicity identification: White  Preferred language: English  Lives at home with:  and roommate/friend    Family History:   The patient's family history is notable for:  PGF with colon cancer  Dad with lung cancer (exposure to agent orange)  Second cousin with metastatic cancer (unknown origin)       Physical Exam:   /82 (BP Location: Left arm, Patient Position: Sitting, Cuff Size: Adult Large)   Pulse 91   Temp 97.9  F (36.6  C) (Oral)   Resp 20   Wt 112.6 kg (248 lb 3.2 oz)   LMP 09/19/2011   SpO2 99%   BMI 41.35 kg/m      General: Alert and oriented, no acute distress  Psych: Mood stable. Linear speech, appropriate affect  :NEFG . Vaginal cuff in tact. No masses. 1 cm raised, scaly lesion within the left inguinal fold.   GI: Small healing wound on right buttock near anal opening. External hemorrhoid present without signs of active bleeding.     Assessment:    Sailaja Mcgee is a 53 year old woman with a history of stage 1C3 borderline mucinous ovarian neoplasm, recurrent now s/p resection 5/2022.     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 Complete resection. Given histology I did not recommend adjuvant chemo or RT. CT scan IJEOMA 12/2022 . Exam today reassuring for IJEOMA. However, patient is complaining of new onset abdominal pain and therefore    -  would recommend repeat CT abdomen/pelvis to assess for any disease recurrence. Will see her back in 6 months for vaginal examination or prior if imaging shows concern.     2.) Genetic risk factors were assessed: referral not indicated at this time    3.)   Rectal Bleeding/Tarry Stools: Last colonoscopy 2019 which was normal. On exam, there is evidence of external hemorrhoids and small healing area over her previous anal fistula. Suspect this could be contributing her to her bright red bleeding. However, with her now  tarry stools recommend GI referral for evaluation and likely repeat colonoscopy.     4.) Upper Vulvar lesion: likely Actinic keratosis, however ddx includes SCC vs BSC. Will plan [facility] [place]. Will likely be at the end of August or early September. Will call patient with final date.     Seen and discussed with Dr. Soto.   Yani Del Cid MD PGY3        I saw and evaluated the patient. I agree with the findings and the plan of care as documented in Dr. Del Cid's note, total time today is 25 minutes.     Ivy Soto MD  Gynecologic Oncology  Pager [phone]

## 2023-06-21 NOTE — LETTER
"    2023         RE: Sailaja Mcgee  [address] [facility], [place] [address]        Dear Colleague,    Thank you for referring your patient, Sailaja Mcgee, to the [facility]. Please see a copy of my visit note below.    Oncology Rooming Note    2023 4:02 PM   Sailaja Mcgee is a 53 year old female who presents for:    Chief Complaint   Patient presents with     Oncology Clinic Visit     Initial Vitals: /82 (BP Location: Left arm, Patient Position: Sitting, Cuff Size: Adult Large)   Pulse 91   Temp 97.9  F (36.6  C) (Oral)   Resp 20   Wt 112.6 kg (248 lb 3.2 oz)   LMP 2011   SpO2 99%   BMI 41.35 kg/m   Estimated body mass index is 41.35 kg/m  as calculated from the following:    Height as of 22: 1.65 m (5' 4.96\").    Weight as of this encounter: 112.6 kg (248 lb 3.2 oz). Body surface area is 2.27 meters squared.  Data Unavailable Comment: Data Unavailable   Patient's last menstrual period was 2011.  Allergies reviewed: Yes  Medications reviewed: Yes    Medications: Medication refills not needed today.  Pharmacy name entered into [facility]:    [facility] - [place], [place] - [id number] [place] [address] PHARMACY [facility] - [place], [place] - [address] [facility]    Clinical concerns: NO      Yani Del Cid MD      GYN Oncology Follow Up Visit    Referring provider:    Bert Recinos MD  [address] [place] [address]   RE: Sailaja Mcgee  : 1970  ROGER: 2023       CC: Recurrent borderline mucinous ovarian tumor    HPI: Ms Sailaja Mcgee is a 53 year old year old female who presents for follow up of recurrent  borderline mucinous tumor       Treatment history:   19: MRI of back for back pain  19: CT scan showing large pelvic mass. Ca125 282, CEA 6.9,   7/15/19: XLap/BSO/Appy/Omx/Biopsies/CL. 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 Significant adhesive disease (previous left " nephrectomy)  3/4/2022: Pelvic MRI (for rectal symptoms)   3/11/2022: CT C/A/P with complex cystic solid mass of low hemipelvis 5.4cm x 3.5cm. Stable lymphadenopathy.  12.   3/31/22: IR guided biopsy, benign (scant tissue)   4/21/2022: Pelvic exam (Gyn Onc - Dr Ivy Soto) with vaginal polyp. Pathology shows mucinous cells, no atypia.   5/16/22: Robotic assisted radical upper vaginectomy, resection of pelvic mass, repair of cystotomy. FInal pathology with recurrent borderline mucinous tumor.     Today, patient reports she has been experiencing abdominal pain and lower back pain over the last month. She cannot think of anything that triggers her pain. Has no trouble with eating. Good energy. Drinking enough fluids. Having no bladder issues. She is reporting bright red bleeding with BMs and tarry stools. She has had this since her procedure with colorectal for her anal fistula, however it seems to have gotten worse recently. She denies any dizziness or SOB. No nausea or vomiting. Denies any new onset fatigue. Has not seen GI or any other providers for this problem. Patient also reports having two episodes of sharp pain that radiates from her head to her lower back. Happened before she got in the shower. The last time was 2 weeks ago. Does not have a history of migraines. Did not notice any weakness during those episodes.     Lastly, she has noticed a small raised bump in her pelvic region. She denies any itching, pain or bleeding. She puts Deoderant on it to keep it dry. Does not have these anywhere else on her body.     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       Past Surgical History:   Procedure Laterality Date      COLONOSCOPY N/A 7/10/2019    Procedure: COLONOSCOPY;  Surgeon: Sheba Tenorio MD;  [facility]     CYSTOSCOPY  10/24/2011    Procedure:CYSTOSCOPY; Surgeon:NAYELY MADRIGAL; Location:[facility] OR     CYSTOSCOPY N/A 2022    Procedure: cystoscopy,;  Surgeon: Ivy Soto MD;  [facility]     CYSTOSCOPY  2022    Procedure: ;  Surgeon: Ivy Soto MD;  [facility]     EXAM UNDER ANESTHESIA, FISTULOTOMY RECTUM, COMBINED N/A 2022    Procedure: Exam under anesthesia, drainage of abscess and placement of seton;  Surgeon: Pham Winn MD;  Location: [facility] OR     EXAM UNDER ANESTHESIA, FISTULOTOMY RECTUM, COMBINED  2022    Procedure: ;  Surgeon: Pham Winn MD;  Location: [facility] OR     HC BRAIN MAPPING, 1ST HOUR MD ATTENDANCE      ablation of seizure focus     HC REDUCTION OF LARGE BREAST       HYSTERECTOMY TOTAL ABD, [place] SALPINGO-OOPHORECTOMY, NODE DISSECTION, TUMOR DEBULKING, COMBINED Bilateral 7/15/2019    Procedure: Exploratory Laparotomy, Total Abdominal Hysterectomy, Removal Of Both Tubes And Ovaries, Omentectomy, Appendectomy, Peritoneal Biopsies;  Surgeon: Ivy Soto MD;  [facility]     HYSTERECTOMY VAGINAL  10/24/2011    Procedure:HYSTERECTOMY VAGINAL; vaginal hysterectomy and cystoscopy; Surgeon:NAYELY MADRIGAL; Location:[facility] OR     HYSTEROSCOPY,ABLATION ENDOMETRIUM       VAGINECTOMY, ROBOT-ASSISTED N/A 2022    Procedure: Robotic assisted radical upper vaginectomy, pelvic mass excision,;  Surgeon: Ivy Soto MD;  [facility]     [facility] LAP,DONOR KIDNEY REMOV,LIVING  2009    Left laparoscopic donor nephrectomy.  Donating to brother. [facility].        OBGYN history and Health Maintenance:    Last Pap Smear: , NILM prior to hysterectomy  Last Mammogram:never  Last Colonoscopy: 2019 at [facility] prior to surgery, normal    Current Outpatient Medications   Medication Sig Dispense Refill     omeprazole (PRILOSEC OTC) 20 MG  EC tablet Take 40 mg by mouth daily              Allergies   Allergen Reactions     Aspirin Rash     Rash on face        Social History:  Social History     Tobacco Use     Smoking status: Never     Smokeless tobacco: Never     Tobacco comments:     no smokers in the household   Substance Use Topics     Alcohol use: Yes     Comment: rarely     Work: own [employer].  Ethnicity identification: White  Preferred language: English  Lives at home with:  and roommate/friend    Family History:   The patient's family history is notable for:  PGF with colon cancer  Dad with lung cancer (exposure to agent orange)  Second cousin with metastatic cancer (unknown origin)       Physical Exam:   /82 (BP Location: Left arm, Patient Position: Sitting, Cuff Size: Adult Large)   Pulse 91   Temp 97.9  F (36.6  C) (Oral)   Resp 20   Wt 112.6 kg (248 lb 3.2 oz)   LMP 09/19/2011   SpO2 99%   BMI 41.35 kg/m      General: Alert and oriented, no acute distress  Psych: Mood stable. Linear speech, appropriate affect  :NEFG . Vaginal cuff in tact. No masses. 1 cm raised, scaly lesion within the left inguinal fold.   GI: Small healing wound on right buttock near anal opening. External hemorrhoid present without signs of active bleeding.     Assessment:    Sailaja Mcgee is a 53 year old woman with a history of stage 1C3 borderline mucinous ovarian neoplasm, recurrent now s/p resection 5/2022.     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 Complete resection. Given histology I did not recommend adjuvant chemo or RT. CT scan IJEOMA 12/2022 . Exam today reassuring for IJEOMA. However, patient is complaining of new onset abdominal pain and therefore    -  would recommend repeat CT abdomen/pelvis to assess for any disease recurrence. Will see her back in 6 months for vaginal examination or prior if imaging shows concern.     2.) Genetic risk factors were assessed: referral not indicated at this time    3.)    Rectal Bleeding/Tarry Stools: Last colonoscopy 2019 which was normal. On exam, there is evidence of external hemorrhoids and small healing area over her previous anal fistula. Suspect this could be contributing her to her bright red bleeding. However, with her now tarry stools recommend GI referral for evaluation and likely repeat colonoscopy.     4.) Upper Vulvar lesion: likely Actinic keratosis, however ddx includes SCC vs BSC. Will [facility] [place]. Will likely be at the end of August or early September. Will call patient with final date.     Seen and discussed with Dr. Soto.   Yani Del Cid MD PGY3        I saw and evaluated the patient. I agree with the findings and the plan of care as documented in Dr. Del Cid's note, total time today is 25 minutes.     Ivy Soto MD  Gynecologic Oncology  Pager [phone]            Again, thank you for allowing me to participate in the care of your patient.        Sincerely,        Ivy Soto MD

## 2023-06-26 ENCOUNTER — HOSPITAL ENCOUNTER (OUTPATIENT)
Dept: CT IMAGING | Facility: CLINIC | Age: 53
Discharge: HOME | End: 2023-06-26
Attending: OBSTETRICS & GYNECOLOGY | Admitting: OBSTETRICS & GYNECOLOGY
Payer: COMMERCIAL

## 2023-06-26 DIAGNOSIS — D39.12 OVARIAN TUMOR OF BORDERLINE MALIGNANCY, LEFT: ICD-10-CM

## 2023-06-26 LAB
CREAT BLD-MCNC: 1.2 MG/DL (ref 0.5–1)
GFR SERPL CREATININE-BSD FRML MDRD: 54 ML/MIN/1.73M2

## 2023-06-26 PROCEDURE — 74177 CT ABD & PELVIS W/CONTRAST: CPT

## 2023-06-26 PROCEDURE — 250N000009 HC RX 250: Performed by: OBSTETRICS & GYNECOLOGY

## 2023-06-26 PROCEDURE — 250N000011 HC RX IP 250 OP 636: Performed by: OBSTETRICS & GYNECOLOGY

## 2023-06-26 PROCEDURE — 82565 ASSAY OF CREATININE: CPT

## 2023-06-26 RX ORDER — IOPAMIDOL 755 MG/ML
500 INJECTION, SOLUTION INTRAVASCULAR ONCE
Status: COMPLETED | OUTPATIENT
Start: 2023-06-26 | End: 2023-06-26

## 2023-06-26 RX ADMIN — SODIUM CHLORIDE 60 ML: 9 INJECTION, SOLUTION INTRAVENOUS at 09:17

## 2023-06-26 RX ADMIN — IOPAMIDOL 100 ML: 755 INJECTION, SOLUTION INTRAVENOUS at 09:17

## 2023-06-28 ENCOUNTER — TELEPHONE (OUTPATIENT)
Dept: ONCOLOGY | Facility: CLINIC | Age: 53
End: 2023-06-28
Payer: COMMERCIAL

## 2023-06-28 NOTE — TELEPHONE ENCOUNTER
----- Message from Ivy Soto MD sent at 6/26/2023 10:15 PM CDT -----  Can you let Ilene know her scan looks great. No signs of recurrent borderline tumor. No clear cause for abdominal/pelvic discomfort.   ----- Message -----  From: Luis Radiant Ib  Sent: 6/26/2023   4:25 PM CDT  To: Ivy Soto MD

## 2023-06-28 NOTE — TELEPHONE ENCOUNTER
Spoke to Sailaja let her know her CT scan looks good does not show any recurrence and does not explain why she would be having intermittent abdominal pain.  She is meeting to have her GI consult and set up colonoscopy.  Just waiting to hear back about her Vulvar surgery late August / early September.    Thanks    Renetta Rainey RN Care Coordinator  [facility]  Ph.[phone] Fax. [phone]

## 2023-06-30 ENCOUNTER — TELEPHONE (OUTPATIENT)
Dept: GASTROENTEROLOGY | Facility: CLINIC | Age: 53
End: 2023-06-30
Payer: COMMERCIAL

## 2023-06-30 NOTE — TELEPHONE ENCOUNTER
"Endoscopy Scheduling Screen    Have you had a positive Covid test in the last 14 days?  No    Are you active on MyChart?   No    What insurance is in the chart?  BC/BS: Schedule in [facility] unless patient meets exclusion criteria.     Ordering/Referring Provider: SWETA BLOOD   (If ordering provider performs procedure, schedule with ordering provider unless otherwise instructed. )    BMI: Estimated body mass index is 41.35 kg/m  as calculated from the following:    Height as of 12/27/22: 1.65 m (5' 4.96\").    Weight as of 6/21/23: 112.6 kg (248 lb 3.2 oz).     Sedation Ordered  moderate sedation.   If patient BMI > 50 do not schedule in [facility].    Are you taking any prescription medications for pain?   No    Are you taking methadone or Suboxone?  No    Do you have a history of malignant hyperthermia or adverse reaction to anesthesia?  No    (Females) Are you currently pregnant?   No     Have you been diagnosed or told you have pulmonary hypertension?   No    Do you have an LVAD?  No    Have you been told you have moderate to severe sleep apnea?  No    Have you been told you have COPD, asthma, or any other lung disease?  No    Do you have any heart conditions?  No     Have you ever had or are you awaiting a heart or lung transplant?   No    Have you had a stroke or transient ischemic attack (TIA aka \"mini stroke\" in the last 6 months?   No    Have you been diagnosed with or been told you have cirrhosis of the liver?   No    Are you currently on dialysis?   No    Do you need assistance transferring?   No    BMI: Estimated body mass index is 41.35 kg/m  as calculated from the following:    Height as of 12/27/22: 1.65 m (5' 4.96\").    Weight as of 6/21/23: 112.6 kg (248 lb 3.2 oz).     Is patients BMI > 40 and scheduling location [facility]?  No    Do you take the medication Phentermine, Ozempic or Wegovy?  No    Do you take the medication Naltrexone?  No    Do you take blood thinners?  No      Prep   Are you currently on " dialysis or do you have chronic kidney disease?  No    Do you have a diagnosis of diabetes?  No    Do you have a diagnosis of cystic fibrosis (CF)?  No    On a regular basis do you go 3 -5 days between bowel movements?  No    BMI > 40?  Yes (Extended Prep)    Preferred Pharmacy:    [facility] [address], [place] - [address] [facility]  [address] [facility] [address]  Phone: [phone] Fax: [phone]      Final Scheduling Details   Colonoscopy prep sent?  Golytely Extended Prep    Procedure scheduled  Colonoscopy    Surgeon:  RUSS     Date of procedure:  08/29/2023     Schedule PAC:   No    Location  [facility]    Sedation   MAC/Deep Sedation  - PER LOCATION  Patient Reminders:    You will receive a call from a Nurse to review instructions and health history.  This assessment must be completed prior to your procedure.  Failure to complete the Nurse assessment may result in the procedure being cancelled.       On the day of your procedure, please designate an adult(s) who can drive you home stay with you for the next 24 hours. The medicines used in the exam will make you sleepy. You will not be able to drive.       You cannot take public transportation, ride share services, or non-medical taxi service without a responsible caregiver.  Medical transport services are allowed with the requirement that a responsible caregiver will receive you at your destination.  We require that drivers and caregivers are confirmed prior to your procedure.

## 2023-07-17 ENCOUNTER — TELEPHONE (OUTPATIENT)
Dept: FAMILY MEDICINE | Facility: OTHER | Age: 53
End: 2023-07-17
Payer: COMMERCIAL

## 2023-07-17 ENCOUNTER — NURSE TRIAGE (OUTPATIENT)
Dept: NURSING | Facility: CLINIC | Age: 53
End: 2023-07-17
Payer: COMMERCIAL

## 2023-07-17 NOTE — TELEPHONE ENCOUNTER
Nurse Triage SBAR    Is this a 2nd Level Triage? NO    Situation:   Spoke with 53 yr old Sailaja who c/o:    L breast redness, intermittent swelling and tenderness to the touch x 2 -4 days.  Denies fever.  Reports some mild body aches.  Symptoms began after an ultrasound of the colon 4 days ago.    Background:   Ultrasound of the colon 7/13/23 by RENY.  Patient states rubbing alcohol was injected into the colon for the ultrasound.  Patient is concerned if symptoms are a reaction to the rubbing alcohol.  Patient developed whole body aches like the flu for 2 days after the procedure with L breast redness and swelling of the veins on the inner side of the breast.    Patient states the area is fading, but at times will swell and become sore to the touch.    Assessment:   Evaluation needed.    Protocol Recommended Disposition:   See in office today.    Recommendation:   Advised OV today per protocol  Patient transferred to PSR to schedule OV or UC option.  Advised patient to call back if any symptoms are worsening.    Kathe Cuellar RN  [facility]    Reason for Disposition    Breast looks infected (spreading redness, feels hot or painful to touch) and no fever    Additional Information    Negative: Chest pain    Negative: Breastfeeding questions about baby    Negative: Breastfeeding questions about mother (breast symptoms or feeling sick)    Negative: Postpartum breast pain and swelling, not breastfeeding    Negative: Breastfeeding questions about mother's medicines and diet    Negative: Small spot, skin growth or mole    Negative: SEVERE breast pain and fever > 103 F  (39.4 C)    Negative: Patient sounds very sick or weak to the triager    Negative: Breast looks infected (spreading redness, feels hot or painful to touch) and fever    Protocols used: BREAST SYMPTOMS-A-OH

## 2023-07-17 NOTE — TELEPHONE ENCOUNTER
Reason for Call:  Appointment Request    Patient requesting this type of appt:  Same day appt    Requested provider: Suellen Millard    Reason patient unable to be scheduled: Not within requested timeframe    When does patient want to be seen/preferred time: 3-7 days    Comments: Spoke with RN and was told to be seen by a provider soon, but wanted to stay with PCP - Scheduled 9/5/23 at earliest time for breast swelling and tenderness    Okay to leave a detailed message?: Yes at Cell number on file:    Telephone Information:   Mobile [phone]       Call taken on 7/17/2023 at 8:47 AM by Nahun Mares

## 2023-07-19 ENCOUNTER — OFFICE VISIT (OUTPATIENT)
Dept: FAMILY MEDICINE | Facility: OTHER | Age: 53
End: 2023-07-19
Payer: COMMERCIAL

## 2023-07-19 VITALS
DIASTOLIC BLOOD PRESSURE: 82 MMHG | TEMPERATURE: 97.4 F | HEART RATE: 80 BPM | BODY MASS INDEX: 41.15 KG/M2 | SYSTOLIC BLOOD PRESSURE: 120 MMHG | OXYGEN SATURATION: 97 % | WEIGHT: 247 LBS | HEIGHT: 65 IN | RESPIRATION RATE: 20 BRPM

## 2023-07-19 DIAGNOSIS — N64.4 BREAST PAIN, LEFT: Primary | ICD-10-CM

## 2023-07-19 PROCEDURE — 99213 OFFICE O/P EST LOW 20 MIN: CPT | Performed by: PHYSICIAN ASSISTANT

## 2023-07-19 RX ORDER — DICLOXACILLIN SODIUM 500 MG
500 CAPSULE ORAL 4 TIMES DAILY
Qty: 28 CAPSULE | Refills: 0 | Status: SHIPPED | OUTPATIENT
Start: 2023-07-19 | End: 2023-07-26

## 2023-07-19 ASSESSMENT — PAIN SCALES - GENERAL: PAINLEVEL: NO PAIN (0)

## 2023-07-19 NOTE — PROGRESS NOTES
Assessment & Plan     Breast pain, left  She is overdue for mammogram and there are abnormalities found on examination therefore feel diagnostic breast imaging is required.  Also question mastitis as a potential cause given bodyaches, low grade fever and the swelling with pain. While this is not as common since she is not a lactating individual it is still possible. Will order breast imaging and treat for potential mastitis.  She will monitor and update if any symptoms change.    - MA Diagnostic Digital Bilateral; Future  - US Breast Left Complete 4 Quadrants; Future  - dicloxacillin (DYNAPEN) 500 MG capsule; Take 1 capsule (500 mg) by mouth 4 times daily for 7 days    Options for treatment and follow-up care were reviewed with the patient and/or guardian. Patient and/or guardian engaged in the decision making process and verbalized understanding of the options discussed and agreed with the final plan.      SIOMARA Esqueda [facility] BIENVENIDO Menard is a 53 year old, presenting for the following health issues:  Breast Problem        7/19/2023    11:02 AM   Additional Questions   Roomed by amy   Accompanied by alone         7/19/2023    11:02 AM   Patient Reported Additional Medications   Patient reports taking the following new medications none     History of Present Illness       Reason for visit:  Breast  Symptom onset:  1-2 weeks ago  Symptoms include:  A rash, veins popping out  Symptom intensity:  Mild  Symptom progression:  Improving  Had these symptoms before:  No  What makes it worse:  Touching  What makes it better:  None    She eats 0-1 servings of fruits and vegetables daily.She consumes 1 sweetened beverage(s) daily.She exercises with enough effort to increase her heart rate 60 or more minutes per day.  She exercises with enough effort to increase her heart rate 7 days per week.   She is taking medications regularly.       She recently had a CT procedure done  "where they injected or cleaned with some hydrogen peroxide but it was in a completely different area than her symptoms.  After that procedure she had noticed the next day her entire body just didn't feel well but she had no cold symptoms to suggest influenza.  She noticed that the veins on her left breast seemed engorged, more prominent, there was some redness and tenderness. Now today the symptoms have lessened. She did have a temp up to 99.  She has noticed some of the pain can almost radiate into the back.    No nipple discharge or drainage.     Review of Systems   Constitutional, skin, cardiovascular, pulmonary systems are negative, except as otherwise noted.      Objective    /82   Pulse 80   Temp 97.4  F (36.3  C) (Temporal)   Resp 20   Ht 1.65 m (5' 4.96\")   Wt 112 kg (247 lb)   LMP 09/19/2011   SpO2 97%   BMI 41.15 kg/m    Body mass index is 41.15 kg/m .  Physical Exam   GENERAL: healthy, alert and no distress  BREAST: RIGHT : normal without masses, tenderness or nipple discharge and no palpable axillary masses or adenopathy  LEFT: there is mild erythema, with slight warmth and swelling of the medial half of the breast, no  Nipple discharge or tenderness, no axillary lymphadenopathy.   MS: no gross musculoskeletal defects noted, no edema        "

## 2023-08-08 ENCOUNTER — HOSPITAL ENCOUNTER (OUTPATIENT)
Facility: CLINIC | Age: 53
End: 2023-08-08
Attending: COLON & RECTAL SURGERY | Admitting: COLON & RECTAL SURGERY
Payer: COMMERCIAL

## 2023-08-08 ENCOUNTER — HOSPITAL ENCOUNTER (OUTPATIENT)
Dept: MAMMOGRAPHY | Facility: CLINIC | Age: 53
Discharge: HOME | End: 2023-08-08
Attending: PHYSICIAN ASSISTANT
Payer: COMMERCIAL

## 2023-08-08 ENCOUNTER — HOSPITAL ENCOUNTER (OUTPATIENT)
Dept: ULTRASOUND IMAGING | Facility: CLINIC | Age: 53
Discharge: HOME | End: 2023-08-08
Attending: PHYSICIAN ASSISTANT
Payer: COMMERCIAL

## 2023-08-08 DIAGNOSIS — N64.4 BREAST PAIN, LEFT: ICD-10-CM

## 2023-08-08 PROCEDURE — 76642 ULTRASOUND BREAST LIMITED: CPT | Mod: LT

## 2023-08-08 PROCEDURE — 77066 DX MAMMO INCL CAD BI: CPT

## 2023-08-08 NOTE — RESULT ENCOUNTER NOTE
Called and spoke with patient, gave message from provider to patient.   Milagros Wei MA on 8/8/2023 at 1:13 PM

## 2023-08-22 ENCOUNTER — MYC MEDICAL ADVICE (OUTPATIENT)
Dept: SURGERY | Facility: CLINIC | Age: 53
End: 2023-08-22
Payer: COMMERCIAL

## 2023-08-22 NOTE — PROGRESS NOTES
"S:Pre call for scheduled colonoscopy on August 29,2023.  Patient requesting a prep that is not Golytely.   B: Patient reports throwing up the Golytely with the last colonoscopy.  \"I was able to drink about half of it and then I started throwing up.  I was cleaned out though when the colonoscopy was done.\"  A: Patient is willing to use Miralax Prep for her colonoscopy.  Will send the Miralax prep instructions to patient.  R: Patient will call [phone] if she has any questions once she has read the Miralax prep instructions.    "

## 2023-08-22 NOTE — TELEPHONE ENCOUNTER
MiraLAX Bowel Prep WITHOUT Magnesium Citrate  Prep Instructions for your Colonoscopy  Pre-Assessment Phone Number: [facility]; [phone]      Please read these instructions carefully at least 7 days prior to your colonoscopy procedure. Be sure to follow all directions completely. The inside of your colon must be clean to allow for a complete examination for the presence of any growths, polyps, and/or abnormalities, as well as their biopsy or removal. A number of tips are included in order to make this part of the procedure as comfortable as possible.    Getting ready:   You will receive a call from a Nurse to review instructions and health history.  This assessment must be completed prior to your procedure.  Failure to complete the Nurse assessment phone call may result in the procedure being cancelled.  You must arrange for someone to drive you home after your exam. Your colonoscopy cannot be done unless you have a ride. If you need to use public transportation, a responsible caregiver must ride with you and stay with you for a minimum of 24 hours.  Check with your insurance company to be sure they will cover this exam.  Go to the drug store and buy:  Four (4) - Dulcolax (Bisacodyl) 5mg tablets   2 - 8.3 ounce bottles of Miralax   80 ounces of Gatorade (not red or purple)       7 days before the exam:  Talk to your prescribing provider: If you take blood thinners (such as Coumadin, Plavix, Xarelto, Eliquis, Lovenox, or others), these medications may need to be stopped temporarily before your procedure. Your prescribing provider will tell you what to do.   Talk to your prescribing provider: If you take prescription NSAIDS (such as Sulindac, Celebrex, Mobic, Relafen). Your prescribing provider will tell you what to do.   Stop taking fiber and iron supplements   Stop eating corn, popcorn, nuts and foods that contain seeds. These can stay in the colon for many days and they can clog up the colonoscope.     3  days before the exam:  Begin a low-fiber diet (see below).   Drink at least 4 to 6 large glasses of water or sports drink each day.     One day before the exam:  Only clear liquid diet is allowed (see below). No solid food should be eaten.   Drink at least 8 to 10 full glasses of clear liquids during the day (no red or purple).   Stop taking NSAID pain relievers, such as Advil, Ibuprofen, Motrin, etc.  You may take Tylenol.  The colonoscopy prep is taken in multiple steps. Note that the timing of the final step depends on your arrival time for the examination.   Once you start drinking the solution, stay near a toilet while using this medicine.   Besides diarrhea (watery stools), you may have mild cramping, bloating and nausea.    You may want to use Vaseline on the skin around your anus after each bowel movement to prevent irritation. You can also use wet wipes to prevent irritation.     Bowel cleansing:  At 12 pm, take 2 Dulcolax (Bisacodyl) tablets. This may take a minimum of approximately 6 hours to begin working.  At 3 pm, mix one entire bottle of Miralax with 64 ounces of Gatorade in a pitcher and stir to dissolve the powder. Chill if desired, but do not add ice.  At 4 pm, drink an 8-ounce glass of the Miralax and Gatorade mixture every 15 minutes until the pitcher is half empty (about 4 glasses). Store the rest in the fridge. You must drink each glass quickly.  Bowel movements usually begin about one hour after drinking the mixture. The stool is likely to be brown at this stage.  Continue to drink clear liquids.  At 8 pm, take 2 Dulcolax (Bisacodyl) tablets and start drinking the remaining half of the pitcher. Drink one 8-ounce glass of the Miralax and Gatorade mixture every 15 minutes until the pitcher is empty (about 4 glasses). Drink each glass quickly.    Day of the exam:  Mix 4 capfuls of Miralax with 16 ounces of Gatorade and stir to dissolve the powder.  4 hours before your arrival time, drink 16  ounces of the Miralax and Gatorade mixture (an 8-ounce glass every 15 minutes). Drink each glass quickly.  You may take your necessary morning medications with sips of water.  Do not take diabetes medicine by mouth until after your exam.  You may drink clear liquids only up until 2 hours before your arrival time.  Do not smoke, chew tobacco, or swallow anything, including water or gum for at least 2 hours before your arrival time. This is a safety issue. Your procedure could be cancelled if you do not follow directions.  Please do not wear jewelry (i.e. earrings, rings, necklaces, watches, etc) . Leave your purse, billfold, credit cards, and other valuables at home.   Please arrive with a responsible caregiver who can take you home after the test and stay with you for 24 hours. The sedation medicine used will make you sleepy and forgetful. If you do not have someone to take you home, we will cancel your procedure. If using public transportation, you must have someone to ride with you.  Please perform your nebulizer treatments and airway clearance therapy in the morning prior to the procedure (if applicable).  If you have asthma, bring your inhalers.       CLEAR LIQUID DIET    You may have:    Water, tea, coffee (no milk or cream)  Soda pop, Gatorade (not red or purple)  Jell-O, Popsicles (no milk or fruit pieces - not red or purple)  Fat-free soup broth or bouillon  Plain hard candy, such as clear life savers (not red or purple)  Clear juices and fruit-flavored drinks, such as apple juice, white grape juice, Hi-C, and Todd-Aid (not red or purple)   Do not have:    Milk or milk products such as ice cream, malts or shakes, or coffee creamer  Red or purple drinks of any kind such as cranberry juice or grape juice. Avoid red or purple Jell-O, Popsicles, Todd-Aid, sorbet, sherbet and candy  Juices with pulp such as orange, grapefruit, pineapple or tomato juice  Cream soups of any kind  Alcohol and beer  Protein drinks or  protein powder     LOW FIBER DIET   You may have:    Starches: White bread, rolls, biscuits, croissants, Dawn toast, white flour tortillas, waffles, pancakes, Spanish toast; white rice, noodles, pasta, macaroni; cooked and peeled potatoes; plain crackers, saltines; cooked farina or cream of rice; puffed rice, corn flakes, Rice Krispies, Special K   Vegetables: tender cooked and canned, vegetable broths  Fruits and fruit juices: Strained fruit juice, canned fruit without seeds or skin (not pineapple), applesauce, pear sauce, ripe bananas, melons (not watermelon)   Milk products: Milk (plain or flavored), cheese, cottage cheese, yogurt (no berries), custard, ice cream    Proteins: Tender, well-cooked ground beef, lamb, veal, ham, pork, chicken, turkey, fish or organ meats; eggs; creamy peanut butter   Fats and condiments:  Margarine, butter, oils, mayonnaise, sour cream, salad dressing, plain gravy; spices, cooked herbs; sugar, clear jelly, honey, syrup   Snacks, sweets and drinks: Pretzels, hard candy; plain cakes and cookies (no nuts or seeds); gelatin, plain pudding, sherbet, Popsicles; coffee, tea, carbonated ( fizzy ) drinks Do not have:    Starches: Breads or rolls that contain nuts, seeds or fruit; whole wheat or whole grain breads that contain more than 1 gram of fiber per slice; cornbread; corn or whole wheat tortillas; potatoes with skin; brown rice, wild rice, kasha (buckwheat), and oatmeal   Vegetables: Any raw or steamed vegetables; vegetables with seeds; corn in any form   Fruits and fruit juices: Prunes, prune juice, raisins and other dried fruits, berries and other fruits with seeds, canned pineapple juices with pulp such as orange, grapefruit, pineapple or tomato juice  Milk products: Any yogurt with nuts, seeds or berries   Proteins: Tough, fibrous meats with gristle; cooked dried beans, peas or lentils; crunchy peanut butter  Fats and condiments: Pickles, olives, relish, horseradish; jam, marmalade,  preserves   Snacks, sweets and drinks: Popcorn, nuts, seeds, granola, coconut, candies made with nuts or seeds; all desserts that contain nuts, seeds, raisins and other dried fruits, coconut, whole grains or bran.      FAQ:    Why should Miralax be mixed with Gatorade or another clear sports drink?   It is important that your body does not develop an imbalance of electrolytes with the large volume of fluid in this prep. Gatorade/sports drinks contains those electrolytes.   Does Miralax have to be mixed with Gatorade?  Gatorade, Powerade, or any of the other sports drinks are acceptable. If you are diabetic, it is acceptable to use the low sugar options, such as Gatorade Zero, Powerade Zero, G2, etc.  Can I put ice in the Gatorade/Miralax mixture?  We prefer that you mix it and put it in the refrigerator to chill instead of using ice. The ice will melt and dilute the laxative.    Why should the Miralax prep be taken in several steps?   The stool is flushed out by a large wave of fluid going through the colon. Just sipping a large volume of the solution will not achieve the desired result. Studies have shown that two smaller waves (or more in some cases) are better than one large one.    Why is the last Miralax dose so close to the exam?   The intestine continues to produce mucus and waste. Longer intervals between the prep and the exam can lead to less than desired results. However, the stomach must be empty at the time of the exam in order to allow safe sedation. Therefore, there should be nothing by mouth 2 hours before the exam is started.   How do you know if your colon is cleaned out?   After completing the bowel prep, your bowel movements should be all liquid and yellow. Your bowel movements will look similar to urine in the toilet. If there are pieces of stool (poop) in the toilet, or if you can't see to the bottom of the toilet, please call our office for advice. Call [phone] and ask to speak with a  nurse.   Why do you need a responsible  to take you home and stay with you?  We require a responsible caregiver to take you home for your safety. The sedation medicines used to relax you during the procedure can impair your judgement and reaction time, and make you forgetful and possibly a little unsteady. Do not drive, make any important decisions, or sign any legal documents for 24 hours after your procedure.   It is normal to feel bloated and gassy after your procedure. Walking will help move the air through your colon. You can take non-aspirin pain relievers that contain acetaminophen (Tylenol).   When can you eat after your procedure?  You may resume your normal diet when you feel ready, unless advised otherwise by the doctor performing your procedure. Do not drink alcohol for 24 hours after your procedure.   You many resume normal activities (work, exercise, etc.) after 24 hours.   When will you get test results?  You should have your procedure results and any lab results (if applicable) by letter, MyChart message, or phone call within 2 weeks. If you have any questions, please call the doctor that referred you for the procedure.       Last Updated: 6/8/2023

## 2023-08-28 ENCOUNTER — TELEPHONE (OUTPATIENT)
Dept: GASTROENTEROLOGY | Facility: CLINIC | Age: 53
End: 2023-08-28
Payer: COMMERCIAL

## 2023-08-28 NOTE — TELEPHONE ENCOUNTER
Caller:   Reason for Reschedule/Cancellation (please be detailed, any staff messages or encounters to note?): CONFLICT      Prior to reschedule please review:  Ordering Provider:     SWETA BLOOD     Sedation per order: CS  Does patient have any ASC Exclusions, please identify?:       Notes on Cancelled Procedure:  Procedure: Lower Endoscopy [Colonoscopy]   Date: 8/29  Location: [facility]; [address] [place] [address]  Surgeon: LONG      Rescheduled: Yes  Procedure: Lower Endoscopy [Colonoscopy]   Date: 10/11  Location: [facility]; [address] [place] [address]  Surgeon: LONG  Sedation Level Scheduled  MAC,  Reason for Sedation Level PH  Prep/Instructions updated and sent: Y       Send In - basket message to Panc - Tom Pool if EUS  procedure is canceled or rescheduled: [ N/A, YES or NO]

## 2023-10-10 ENCOUNTER — TELEPHONE (OUTPATIENT)
Dept: GASTROENTEROLOGY | Facility: CLINIC | Age: 53
End: 2023-10-10
Payer: COMMERCIAL

## 2023-10-10 NOTE — H&P
[facility] Anesthesia Pre-op History and Physical    Sailaja Mcgee MRN# [id number]   Age: 53 year old YOB: 1970      Date of Surgery: 10/10/2023 Location [facility]      Date of Exam 10/10/2023 Facility (In hospital)       Home clinic: [facility]  Primary care provider: Suellen Millard         Chief Complaint and/or Reason for Procedure:   No chief complaint on file.  Colonoscopy. BRBPR  Nml exam 2019       Active problem list:     Patient Active Problem List    Diagnosis Date Noted    Bilateral carpal tunnel syndrome 03/03/2022     Priority: Medium    Ovarian tumor of borderline malignancy, left 02/24/2022     Priority: Medium    History of seizures 02/18/2020     Priority: Medium    Transient global amnesia 02/16/2020     Priority: Medium    S/P BSO (bilateral salpingo-oophorectomy) 07/15/2019     Priority: Medium    Right lower quadrant abdominal mass 06/25/2019     Priority: Medium    CKD (chronic kidney disease) stage 3, GFR 30-59 ml/min (H) 06/11/2019     Priority: Medium    Obesity (BMI 35.0-39.9) with comorbidity (H) 06/10/2019     Priority: Medium    Anemia 01/11/2014     Priority: Medium    Donor of kidney for transplant 01/09/2014     Priority: Medium    S/P laparoscopic hysterectomy 10/24/2011     Priority: Medium     For menorrhagia.   Cervix and uterus removed.         Dysmenorrhea 09/28/2011     Priority: Medium    Menorrhagia 09/28/2011     Priority: Medium    CARDIOVASCULAR SCREENING; LDL GOAL LESS THAN 160 10/31/2010     Priority: Medium    GERD (gastroesophageal reflux disease) 01/11/2010     Priority: Medium            Medications (include herbals and vitamins):   Any Plavix use in the last 7 days? No     No current facility-administered medications for this encounter.     Current Outpatient Medications   Medication Sig    omeprazole (PRILOSEC OTC) 20 MG EC tablet Take 40 mg by mouth daily    bisacodyl (DULCOLAX) 5 MG EC  tablet 2 days prior to procedure, take 2 tablets at 4 pm. 1 day prior to procedure, take 2 tablets at 4 pm. For additional instructions refer to your colonoscopy prep instructions.    bisacodyl (DULCOLAX) 5 MG EC tablet 2 days prior to procedure, take 2 tablets at 4 pm. 1 day prior to procedure, take 2 tablets at 4 pm. For additional instructions refer to your colonoscopy prep instructions.    polyethylene glycol (GOLYTELY) 236 g suspension 2 days prior at 5pm, mix and drink half of a jug of Golytely. Drink an 8 oz. glass of Golytely every 15 minutes until half of the jug is gone. Place remainder of Golytely in the refrigerator. 1 day prior at 5 pm, drink the 2nd half of a jug of Golytely bowel prep. 6 hours before your check-in time, drink an 8 oz. glass of Golytely every 15 minutes until half of the 2nd jug of Golytely is gone. Discard remainder of second jug.    polyethylene glycol (GOLYTELY) 236 g suspension 2 days prior at 5pm, mix and drink half of a jug of Golytely. Drink an 8 oz. glass of Golytely every 15 minutes until half of the jug is gone. Place remainder of Golytely in the refrigerator. 1 day prior at 5 pm, drink the 2nd half of a jug of Golytely bowel prep. 6 hours before your check-in time, drink an 8 oz. glass of Golytely every 15 minutes until half of the 2nd jug of Golytely is gone. Discard remainder of second jug.             Allergies:      Allergies   Allergen Reactions    Aspirin Rash     Rash on face     Allergy to Latex? No  Allergy to tape?   No  Intolerances:             Physical Exam:   All vitals have been reviewe  No data found.  No intake/output data recorded.  Lungs:   No increased work of breathing, good air exchange, clear to auscultation bilaterally, no crackles or wheezing     Cardiovascular:   Normal apical impulse, regular rate and rhythm, normal S1 and S2, no S3 or S4, and no murmur noted             Lab / Radiology Results:            Anesthetic risk and/or ASA classification:        Shine Jaeger MD

## 2023-10-10 NOTE — TELEPHONE ENCOUNTER
Reason for Reschedule/Cancellation (please be detailed, any staff messages or encounters to note?):     ----- Message from Harsh Del Cid RN sent at 10/10/2023 10:55 AM CDT -----  Regarding: reschedule  Sailaja needs to cancel 10/11 and get rescheduled. She had a death in the family. Please call her to reschedule.      Prior to reschedule please review:  Ordering Provider: Ivy Soto MD   Sedation per order: Moderate  Does patient have any ASC Exclusions, please identify?: N      Notes on Cancelled Procedure:  Procedure: Lower Endoscopy [Colonoscopy]   Date: 10/11  Location: [facility]; [address] [place] [address]  Surgeon: Long      Rescheduled: Yes  Procedure: Lower Endoscopy [Colonoscopy]   Date: 12/04  Location: [facility]; [address] [place] [address]  Surgeon: Long  Sedation Level Scheduled  Mac,  Reason for Sedation Level Facility  Prep: Extended  Instructions updated and sent via: Letter

## 2023-10-12 ENCOUNTER — TELEPHONE (OUTPATIENT)
Dept: FAMILY MEDICINE | Facility: OTHER | Age: 53
End: 2023-10-12
Payer: COMMERCIAL

## 2023-10-12 NOTE — TELEPHONE ENCOUNTER
Reason for Call:  Appointment Request    Patient requesting this type of appt:  Patient would like to be seen for a lump on the top of her foot (when touched it shoots a severe pain up her leg and into her arm)    Requested provider: Any provider at this location     Reason patient unable to be scheduled: Not within requested timeframe    When does patient want to be seen/preferred time: As soon as possible     Comments: Currently scheduled for the end of November, but would like to be seen sooner, if possible.      Okay to leave a detailed message?: Yes at Home number on file [phone] (home)    Call taken on 10/12/2023 at 10:32 AM by Courtney Dawson

## 2023-10-16 ENCOUNTER — ANCILLARY PROCEDURE (OUTPATIENT)
Dept: ULTRASOUND IMAGING | Facility: OTHER | Age: 53
End: 2023-10-16
Attending: PHYSICIAN ASSISTANT
Payer: COMMERCIAL

## 2023-10-16 ENCOUNTER — TELEPHONE (OUTPATIENT)
Dept: FAMILY MEDICINE | Facility: OTHER | Age: 53
End: 2023-10-16

## 2023-10-16 ENCOUNTER — ANCILLARY PROCEDURE (OUTPATIENT)
Dept: GENERAL RADIOLOGY | Facility: OTHER | Age: 53
End: 2023-10-16
Attending: PHYSICIAN ASSISTANT
Payer: COMMERCIAL

## 2023-10-16 ENCOUNTER — OFFICE VISIT (OUTPATIENT)
Dept: FAMILY MEDICINE | Facility: OTHER | Age: 53
End: 2023-10-16
Payer: COMMERCIAL

## 2023-10-16 VITALS
OXYGEN SATURATION: 99 % | BODY MASS INDEX: 40.48 KG/M2 | HEIGHT: 65 IN | TEMPERATURE: 98.4 F | SYSTOLIC BLOOD PRESSURE: 126 MMHG | HEART RATE: 82 BPM | RESPIRATION RATE: 22 BRPM | DIASTOLIC BLOOD PRESSURE: 96 MMHG | WEIGHT: 243 LBS

## 2023-10-16 DIAGNOSIS — R22.41 FOOT MASS, RIGHT: Primary | ICD-10-CM

## 2023-10-16 DIAGNOSIS — R22.41 FOOT MASS, RIGHT: ICD-10-CM

## 2023-10-16 PROCEDURE — 73630 X-RAY EXAM OF FOOT: CPT | Mod: TC | Performed by: RADIOLOGY

## 2023-10-16 PROCEDURE — 76882 US LMTD JT/FCL EVL NVASC XTR: CPT | Mod: TC | Performed by: RADIOLOGY

## 2023-10-16 PROCEDURE — 99213 OFFICE O/P EST LOW 20 MIN: CPT | Performed by: PHYSICIAN ASSISTANT

## 2023-10-16 ASSESSMENT — PAIN SCALES - GENERAL: PAINLEVEL: NO PAIN (0)

## 2023-10-16 NOTE — TELEPHONE ENCOUNTER
----- Message from Suellen Millard PA-C sent at 10/16/2023  4:50 PM CDT -----  Please call patient. Her ultrasound showed a likely cyst.  Recommend Podiatry evaluation, looks like he might have openings tomorrow in [place], give # to call to schedule.     Suellen Millard PA-C

## 2023-10-16 NOTE — PROGRESS NOTES
Assessment & Plan     Foot ifrah, right  Elected to obtain imaging of the area to assess what type of mass this is, suspected this was a cystic structure. Ultrasound confirmed this.  Because of the discomfort she has with it and given the location recommended evaluation with Podiatry for discussion of management.  Referral placed.   - US Lower Extremity Non Vascular Right; Future  - XR Foot Right G/E 3 Views; Future  - Orthopedic  Referral; Future    Options for treatment and follow-up care were reviewed with the patient and/or guardian. Patient and/or guardian engaged in the decision making process and verbalized understanding of the options discussed and agreed with the final plan.    Portions of this note were completed using [employer] AI and/or Dragon dictation software.  If [employer] was used, patient gave verbal consent prior to the start of the visit.  Although reviewed, there may be typographical and other inadvertent errors that remain.      SIOMARA [facility] BIENVENIDO Menard is a 53 year old, presenting for the following health issues:  Ifrah      10/16/2023     2:02 PM   Additional Questions   Roomed by Keyona Helton    History of Present Illness       Reason for visit:  Lump on foot  Symptom onset:  3-7 days ago    She eats 2-3 servings of fruits and vegetables daily.She consumes 1 sweetened beverage(s) daily.She exercises with enough effort to increase her heart rate 60 or more minutes per day.  She exercises with enough effort to increase her heart rate 7 days per week.   She is taking medications regularly.         Skin Lesion  Onset/Duration: 1.5 weeks  Description: when touched feels like her entire leg has gone numb and can have right arm feel numb sometimes  Location: right foot, lateral side of ankle  Color: skin colored  Border description: sharp border, raised  Character: round, raised, painful  Itching: no  Bleeding:  No  Intensity:  " moderate  Progression of Symptoms:  doesn't hurt like it did at first but the numbness is what made her come in  Accompanying signs and symptoms:   Bleeding: No  Scaling: No  Excessive sun exposure/tanning: No  Sunscreen used: YES  History:           Any previous history of skin cancer: No  Any family history of melanoma: No  Previous episodes of similar lesion: No  Precipitating or alleviating factors: palpation makes it worse  Therapies tried and outcome: none    She first noticed the lump about 1.5 to 2 weeks ago. When she first noticed it, it was painful to touch. It has gone to where it does not hurt as much to touch, but when she touches it, it shoots a numbing feeling that goes through the leg up into her arm. She denies any injury or change in shoe wear around that time. She gets numbness once in a while in the leg. She has not noticed any redness. She denies any other swelling other than the bump that is raised. It has not looked bruised. She denies any fevers or chills. She has not tried anything for it so far. She was wearing sandals, but they do not fit her much. It is more red after she takes the shoes off.    Review of Systems   Constitutional, musculoskeletal, neuro, skin systems are negative, except as otherwise noted.      Objective    BP (!) 126/96 (Cuff Size: Adult Large)   Pulse 82   Temp 98.4  F (36.9  C) (Oral)   Resp 22   Ht 1.65 m (5' 4.96\")   Wt 110.2 kg (243 lb)   LMP 09/19/2011   SpO2 99%   BMI 40.49 kg/m    Body mass index is 40.49 kg/m .  Physical Exam   GENERAL: healthy, alert and no distress  MS: Right foot - full passive ROM of the ankle and toes without pain.  There is a mass on the lateral surface of her foot that is firm, slightly mobile and tender to the touch. There is no overlying erythema or bruising noted. No edema noted. Dorsalis pedis pulse and Post Tib pulse normal.    NEURO: Normal strength and tone, mentation intact and speech normal  PSYCH: mentation appears " normal, affect normal/bright    Results for orders placed or performed in visit on 10/16/23   US Lower Extremity Non Vascular Right     Status: None    Narrative    US LOWER EXTREMITY NON VASCULAR RIGHT  DATE/TIME: 10/16/2023 2:47 PM     INDICATION: Right foot mass.     COMPARISON: None.    FINDINGS: Targeted ultrasound of the right dorsal mid foot. This  demonstrates a multilocular subcutaneous cyst measuring 1.3 x 0.7 x  0.4 cm. This demonstrates no internal vascular flow. Otherwise,  targeted ultrasound of the dorsal midfoot is unremarkable.      Impression    IMPRESSION:  1.  Multilocular subcutaneous cystic lesion in the dorsal midfoot,  likely representing a ganglion cyst.  2.  Otherwise, unremarkable examination.    WILBERT ANN MD         SYSTEM ID:  [id number]   Results for orders placed or performed in visit on 10/16/23   XR Foot Right G/E 3 Views     Status: None    Narrative    XR RIGHT FOOT THREE OR MORE VIEWS  10/16/2023 2:25 PM     INDICATION: Right foot mass.  COMPARISON: None.      Impression    IMPRESSION: Normal joint spacing and alignment.  No fracture.  Small  calcaneus enthesophytes.    WILBERT ANN MD         SYSTEM ID:  [id number]

## 2023-10-19 ENCOUNTER — OFFICE VISIT (OUTPATIENT)
Dept: PODIATRY | Facility: CLINIC | Age: 53
End: 2023-10-19
Payer: COMMERCIAL

## 2023-10-19 VITALS
BODY MASS INDEX: 40.48 KG/M2 | SYSTOLIC BLOOD PRESSURE: 126 MMHG | HEIGHT: 65 IN | TEMPERATURE: 97.5 F | DIASTOLIC BLOOD PRESSURE: 86 MMHG | WEIGHT: 243 LBS

## 2023-10-19 DIAGNOSIS — M67.471 GANGLION CYST OF RIGHT FOOT: Primary | ICD-10-CM

## 2023-10-19 DIAGNOSIS — R22.41 FOOT MASS, RIGHT: ICD-10-CM

## 2023-10-19 PROCEDURE — 99203 OFFICE O/P NEW LOW 30 MIN: CPT | Performed by: PODIATRIST

## 2023-10-19 ASSESSMENT — PAIN SCALES - GENERAL: PAINLEVEL: MILD PAIN (2)

## 2023-10-19 NOTE — PATIENT INSTRUCTIONS
Reliable shoe stores: To maximize your experience and provide the best possible fit.  Be sure to show them your foot concerns and tell them Dr. Mattson sent you.      Stores listed in bold have only athletic shoes, and stores that are not bold are mostly casual or variety of shoes    [employer]  [address] [place] [address]  [phone]    [employer] [facility]  [address] [place] [address]  [phone]     [employer] [facility] [place]  [address] [place] [address]  [phone]    [employer]  [address] [facility]  [facility] [address]  [phone]    [employer]  [address] [place], [place] [address] [phone]    [employer]  [address] [place] [address]  [phone]                         [employer] Locations:     [address] [place] [address]   [phone]     [address] [place] [address] [address] [place] [address]   [phone]     [address] [place] [address]   [phone]     [address] [facility] [place] [address]   [phone]     [address] [place] [address]   [phone]     [address] [place] [address]   [phone]     [address] [facility] [address]   [place] [address]   [phone]     [address] [facility]   [place] [address]   [phone]             How to find reasonable shoes          The correct width    Correct Fitting    Correct Length      Foot Distortion    Posture Distortion                          Torsional Rigidity      Grasp behind the heel and underneath the foot and twist      Bad    Excessive torsion/twist in midfoot     Less torsion/twist in midfoot is better                   Heel Counter Rigidity      Grasp just above   midsole and squeeze      Bad    Soft heel counter      Good    Rigid Heel Counter      Flexion Rigidity      Grasp shoe and bend from forefoot to rearfoot

## 2023-10-19 NOTE — PROGRESS NOTES
"HPI:  Sailaja Mcgee is a 53 year old female who is seen in consultation at the request of Suellen Millard PA-C    Pt presents for eval of:   (Onset, Location, L/R, Character, Treatments, Injury if yes)    XR Right foot and US lower extremity non vascular Right 10/16/23.  Patient states noticing mass x 2 weeks.  Denies injuries  Patient states \"shooting\" pain with any kind of touch.  Wears [employer]  Works as a Owner of [employer].    ROS:  10 point ROS neg other than the symptoms noted above in the HPI.    Patient Active Problem List   Diagnosis    GERD (gastroesophageal reflux disease)    CARDIOVASCULAR SCREENING; LDL GOAL LESS THAN 160    Dysmenorrhea    Menorrhagia    Donor of kidney for transplant    Anemia    S/P laparoscopic hysterectomy    Obesity (BMI 35.0-39.9) with comorbidity (H)    CKD (chronic kidney disease) stage 3, GFR 30-59 ml/min (H)    Right lower quadrant abdominal mass    S/P BSO (bilateral salpingo-oophorectomy)    Transient global amnesia    History of seizures    Ovarian tumor of borderline malignancy, left    Bilateral carpal tunnel syndrome       PAST MEDICAL HISTORY:   Past Medical History:   Diagnosis Date    CKD (chronic kidney disease)     Stage 3    Gastroesophageal reflux disease     History of blood transfusion     Ovarian tumor of borderline malignancy, left     Seizure (H) 2002    corrected by surgical procedure    Solitary kidney 04/2009    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        PAST SURGICAL HISTORY:   Past Surgical History:   Procedure Laterality Date    COLONOSCOPY N/A 7/10/2019    Procedure: COLONOSCOPY;  Surgeon: Sheba Tenorio MD;  [facility]    CYSTOSCOPY  10/24/2011    Procedure:CYSTOSCOPY; Surgeon:NAYELY MADRIGAL; [facility]    CYSTOSCOPY N/A 5/16/2022    Procedure: cystoscopy,;  Surgeon: Ivy Soto MD;  [facility]    CYSTOSCOPY  5/16/2022    Procedure: ;  Surgeon: Ivy Soto MD;  [facility]    EXAM UNDER " ANESTHESIA, FISTULOTOMY RECTUM, COMBINED N/A 4/22/2022    Procedure: Exam under anesthesia, drainage of abscess and placement of seton;  Surgeon: Pham Winn MD;  [facility]    EXAM UNDER ANESTHESIA, FISTULOTOMY RECTUM, COMBINED  4/22/2022    Procedure: ;  Surgeon: Pham Winn MD;  Location: [facility] OR    HC BRAIN MAPPING, 1ST HOUR MD ATTENDANCE  2001    ablation of seizure focus    HC REDUCTION OF LARGE BREAST  2001    HYSTERECTOMY TOTAL ABD, [place] SALPINGO-OOPHORECTOMY, NODE DISSECTION, TUMOR DEBULKING, COMBINED Bilateral 7/15/2019    Procedure: Exploratory Laparotomy, Total Abdominal Hysterectomy, Removal Of Both Tubes And Ovaries, Omentectomy, Appendectomy, Peritoneal Biopsies;  Surgeon: Ivy Soto MD;  [facility]    HYSTERECTOMY VAGINAL  10/24/2011    Procedure:HYSTERECTOMY VAGINAL; vaginal hysterectomy and cystoscopy; Surgeon:NAYELY MADRIGAL; Location: OR    HYSTEROSCOPY,ABLATION ENDOMETRIUM  2001    VAGINECTOMY, ROBOT-ASSISTED N/A 5/16/2022    Procedure: Robotic assisted radical upper vaginectomy, pelvic mass excision,;  Surgeon: Ivy Soto MD;  Location:  OR    [facility] LAP,DONOR KIDNEY REMOV,LIVING  4/2/2009    Left laparoscopic donor nephrectomy.  Donating to brother. [facility] [place].        MEDICATIONS:   Current Outpatient Medications:     omeprazole (PRILOSEC OTC) 20 MG EC tablet, Take 40 mg by mouth daily, Disp: , Rfl:     bisacodyl (DULCOLAX) 5 MG EC tablet, 2 days prior to procedure, take 2 tablets at 4 pm. 1 day prior to procedure, take 2 tablets at 4 pm. For additional instructions refer to your colonoscopy prep instructions. (Patient not taking: Reported on 10/16/2023), Disp: 4 tablet, Rfl: 0    polyethylene glycol (GOLYTELY) 236 g suspension, 2 days prior at 5pm, mix and drink half of a jug of Golytely. Drink an 8 oz. glass of Golytely every 15 minutes until half of the jug is gone. Place remainder of Golytely in the refrigerator. 1 day prior at 5 pm, drink the 2nd  half of a jug of Golytely bowel prep. 6 hours before your check-in time, drink an 8 oz. glass of Golytely every 15 minutes until half of the 2nd jug of Golytely is gone. Discard remainder of second jug. (Patient not taking: Reported on 10/16/2023), Disp: 8000 mL, Rfl: 0     ALLERGIES:    Allergies   Allergen Reactions    Aspirin Rash     Rash on face        SOCIAL HISTORY:   Social History     Socioeconomic History    Marital status:      Spouse name: Not on file    Number of children: Not on file    Years of education: Not on file    Highest education level: Not on file   Occupational History    Not on file   Tobacco Use    Smoking status: Never     Passive exposure: Never    Smokeless tobacco: Never    Tobacco comments:     no smokers in the household   Vaping Use    Vaping Use: Never used   Substance and Sexual Activity    Alcohol use: Yes     Comment: rarely    Drug use: No    Sexual activity: Yes     Partners: Male     Birth control/protection: Female Surgical   Other Topics Concern    Parent/sibling w/ CABG, MI or angioplasty before 65F 55M? Not Asked   Social History Narrative    Not on file     Social Determinants of Health     Financial Resource Strain: Low Risk  (10/16/2023)    Financial Resource Strain     Within the past 12 months, have you or your family members you live with been unable to get utilities (heat, electricity) when it was really needed?: No   Food Insecurity: Low Risk  (10/16/2023)    Food Insecurity     Within the past 12 months, did you worry that your food would run out before you got money to buy more?: No     Within the past 12 months, did the food you bought just not last and you didn t have money to get more?: No   Transportation Needs: Low Risk  (10/16/2023)    Transportation Needs     Within the past 12 months, has lack of transportation kept you from medical appointments, getting your medicines, non-medical meetings or appointments, work, or from getting things that you  "need?: No   Physical Activity: Not on file   Stress: Not on file   Social Connections: Not on file   Interpersonal Safety: Low Risk  (10/16/2023)    Interpersonal Safety     Do you feel physically and emotionally safe where you currently live?: Yes     Within the past 12 months, have you been hit, slapped, kicked or otherwise physically hurt by someone?: No     Within the past 12 months, have you been humiliated or emotionally abused in other ways by your partner or ex-partner?: No   Housing Stability: Low Risk  (10/16/2023)    Housing Stability     Do you have housing? : Yes     Are you worried about losing your housing?: No        FAMILY HISTORY:   Family History   Problem Relation Age of Onset    Cancer Paternal Grandfather         colon cancer    Diabetes Maternal Grandmother     Cerebrovascular Disease Maternal Grandmother     Arthritis Maternal Grandmother     Arthritis Mother     Cardiovascular Maternal Grandfather         heart attack x2    Heart Disease Maternal Grandfather         heart attack x2    Gastrointestinal Disease Paternal Grandmother         liver failure    Genitourinary Problems Brother         kidney failure    Cancer Father         lung cancer diagnosed 7/11 due to chemical exposure war    Cancer Maternal Aunt     Asthma No family hx of     C.A.D. No family hx of     Hypertension No family hx of     Breast Cancer No family hx of     Cancer - colorectal No family hx of     Prostate Cancer No family hx of     Alzheimer Disease No family hx of     Blood Disease No family hx of     Circulatory No family hx of     Eye Disorder No family hx of     Lipids No family hx of     Musculoskeletal Disorder No family hx of     Neurologic Disorder No family hx of     Respiratory No family hx of     Thyroid Disease No family hx of         EXAM:Vitals: /86 (BP Location: Left arm, Cuff Size: Adult Large)   Temp 97.5  F (36.4  C) (Temporal)   Ht 1.65 m (5' 4.96\")   Wt 110.2 kg (243 lb)   LMP " 09/19/2011   BMI 40.49 kg/m    BMI= Body mass index is 40.49 kg/m .    General appearance: Patient is alert and fully cooperative with history & exam.  No sign of distress is noted during the visit.     Psychiatric: Affect is pleasant & appropriate.  Patient appears motivated to improve health.     Respiratory: Breathing is regular & unlabored while sitting.     HEENT: Hearing is intact to spoken word.  Speech is clear.  No gross evidence of visual impairment that would impact ambulation.     Vascular: DP & PT pulses are intact & regular bilaterally.  No significant edema or varicosities noted.  CFT and skin temperature is normal to both lower extremities.     Neurologic: Lower extremity sensation is intact to light touch.  No evidence of weakness or contracture in the lower extremities.  No evidence of neuropathy.    Dermatologic: Skin is intact to both lower extremities with adequate texture, turgor and tone about the integument.  No paronychia or evidence of soft tissue infection is noted.     Musculoskeletal: Patient is ambulatory without assistive device or brace.  Patient is obese.  There is a semifluctuant mass noted over the dorsal lateral right sinus tarsi anterior process of the calcaneus cuboid and lateral cuneiform.  It measures 1-2 cm and is fairly hypersensitive with gentle palpation likely aggravating intermediate dorsal cutaneous nerve    Radiographs 3 views right foot 10/23 demonstrate mild degenerative changes through the midfoot no osteophyte or loose body or severe degeneration.     ASSESSMENT:       ICD-10-CM    1. Ganglion cyst of right foot  M67.471       2. Foot mass, right  R22.41 Orthopedic  Referral        PLAN:  Reviewed patient's chart in [facility].      10/19/2023   Start compression socks  Change shoes, recommend shoes that will not allow any shearing movement or sliding around in the shoe and reduce flexion of the toes which would aggravate this area and the nerve.  Recommend  overall generalized compression of the foot and ankle to see if this will resorb on its own.  Discussed injection and aspiration and what to expect from this as well as limitations of this.  Also discussed surgical excision of a neuroma versus addressing any joint etiology if necessary.  Would recommend an MRI to move forward with any surgical planning if she feels her symptoms warrant surgical treatment or aspiration or not resolved with other conservative options discussed above.  All questions were answered.  Follow-up as needed if this remains symptomatic or disrupting her health or quality of life.      Freeman Mattson DPM

## 2023-10-19 NOTE — LETTER
"    10/19/2023         RE: Sailaja Mcgee  [address] [facility] [address]        Dear Colleague,    Thank you for referring your patient, Sailaja Mcgee, to the [facility]. Please see a copy of my visit note below.    HPI:  Sailaja Mcgee is a 53 year old female who is seen in consultation at the request of Suellen Millard PA-C    Pt presents for eval of:   (Onset, Location, L/R, Character, Treatments, Injury if yes)    XR Right foot and US lower extremity non vascular Right 10/16/23.  Patient states noticing mass x 2 weeks.  Denies injuries  Patient states \"shooting\" pain with any kind of touch.  Wears [employer]  Works as a Owner of [employer].    ROS:  10 point ROS neg other than the symptoms noted above in the HPI.    Patient Active Problem List   Diagnosis     GERD (gastroesophageal reflux disease)     CARDIOVASCULAR SCREENING; LDL GOAL LESS THAN 160     Dysmenorrhea     Menorrhagia     Donor of kidney for transplant     Anemia     S/P laparoscopic hysterectomy     Obesity (BMI 35.0-39.9) with comorbidity (H)     CKD (chronic kidney disease) stage 3, GFR 30-59 ml/min (H)     Right lower quadrant abdominal mass     S/P BSO (bilateral salpingo-oophorectomy)     Transient global amnesia     History of seizures     Ovarian tumor of borderline malignancy, left     Bilateral carpal tunnel syndrome       PAST MEDICAL HISTORY:   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        PAST SURGICAL HISTORY:   Past Surgical History:   Procedure Laterality Date     COLONOSCOPY N/A 7/10/2019    Procedure: COLONOSCOPY;  Surgeon: Sheba Tenorio MD;  [facility]     CYSTOSCOPY  10/24/2011    " Procedure:CYSTOSCOPY; Surgeon:NAYELY MADRIGAL; [facility]     CYSTOSCOPY N/A 5/16/2022    Procedure: cystoscopy,;  Surgeon: Ivy Soto MD;  [facility]     CYSTOSCOPY  5/16/2022    Procedure: ;  Surgeon: Ivy Soto MD;  [facility]     EXAM UNDER ANESTHESIA, FISTULOTOMY RECTUM, COMBINED N/A 4/22/2022    Procedure: Exam under anesthesia, drainage of abscess and placement of seton;  Surgeon: Phma Winn MD;  [facility]     EXAM UNDER ANESTHESIA, FISTULOTOMY RECTUM, COMBINED  4/22/2022    Procedure: ;  Surgeon: Pham Winn MD;  Location: [facility] OR     HC BRAIN MAPPING, 1ST HOUR MD ATTENDANCE  2001    ablation of seizure focus     HC REDUCTION OF LARGE BREAST  2001     HYSTERECTOMY TOTAL ABD, [place] SALPINGO-OOPHORECTOMY, NODE DISSECTION, TUMOR DEBULKING, COMBINED Bilateral 7/15/2019    Procedure: Exploratory Laparotomy, Total Abdominal Hysterectomy, Removal Of Both Tubes And Ovaries, Omentectomy, Appendectomy, Peritoneal Biopsies;  Surgeon: Ivy Soto MD;  [facility]     HYSTERECTOMY VAGINAL  10/24/2011    Procedure:HYSTERECTOMY VAGINAL; vaginal hysterectomy and cystoscopy; Surgeon:NAYELY MADRIGAL; [facility]     HYSTEROSCOPY,ABLATION ENDOMETRIUM  2001     VAGINECTOMY, ROBOT-ASSISTED N/A 5/16/2022    Procedure: Robotic assisted radical upper vaginectomy, pelvic mass excision,;  Surgeon: Ivy Soto MD;  [facility]     [facility] LAP,DONOR KIDNEY REMOV,LIVING  4/2/2009    Left laparoscopic donor nephrectomy.  Donating to brother. [facility] [place].        MEDICATIONS:   Current Outpatient Medications:      omeprazole (PRILOSEC OTC) 20 MG EC tablet, Take 40 mg by mouth daily, Disp: , Rfl:      bisacodyl (DULCOLAX) 5 MG EC tablet, 2 days prior to procedure, take 2 tablets at 4 pm. 1 day prior to procedure, take 2 tablets at 4 pm. For additional instructions refer to your colonoscopy prep instructions. (Patient not taking: Reported on 10/16/2023), Disp: 4  tablet, Rfl: 0     polyethylene glycol (GOLYTELY) 236 g suspension, 2 days prior at 5pm, mix and drink half of a jug of Golytely. Drink an 8 oz. glass of Golytely every 15 minutes until half of the jug is gone. Place remainder of Golytely in the refrigerator. 1 day prior at 5 pm, drink the 2nd half of a jug of Golytely bowel prep. 6 hours before your check-in time, drink an 8 oz. glass of Golytely every 15 minutes until half of the 2nd jug of Golytely is gone. Discard remainder of second jug. (Patient not taking: Reported on 10/16/2023), Disp: 8000 mL, Rfl: 0     ALLERGIES:    Allergies   Allergen Reactions     Aspirin Rash     Rash on face        SOCIAL HISTORY:   Social History     Socioeconomic History     Marital status:      Spouse name: Not on file     Number of children: Not on file     Years of education: Not on file     Highest education level: Not on file   Occupational History     Not on file   Tobacco Use     Smoking status: Never     Passive exposure: Never     Smokeless tobacco: Never     Tobacco comments:     no smokers in the household   Vaping Use     Vaping Use: Never used   Substance and Sexual Activity     Alcohol use: Yes     Comment: rarely     Drug use: No     Sexual activity: Yes     Partners: Male     Birth control/protection: Female Surgical   Other Topics Concern     Parent/sibling w/ CABG, MI or angioplasty before 65F 55M? Not Asked   Social History Narrative     Not on file     Social Determinants of Health     Financial Resource Strain: Low Risk  (10/16/2023)    Financial Resource Strain      Within the past 12 months, have you or your family members you live with been unable to get utilities (heat, electricity) when it was really needed?: No   Food Insecurity: Low Risk  (10/16/2023)    Food Insecurity      Within the past 12 months, did you worry that your food would run out before you got money to buy more?: No      Within the past 12 months, did the food you bought just not  last and you didn t have money to get more?: No   Transportation Needs: Low Risk  (10/16/2023)    Transportation Needs      Within the past 12 months, has lack of transportation kept you from medical appointments, getting your medicines, non-medical meetings or appointments, work, or from getting things that you need?: No   Physical Activity: Not on file   Stress: Not on file   Social Connections: Not on file   Interpersonal Safety: Low Risk  (10/16/2023)    Interpersonal Safety      Do you feel physically and emotionally safe where you currently live?: Yes      Within the past 12 months, have you been hit, slapped, kicked or otherwise physically hurt by someone?: No      Within the past 12 months, have you been humiliated or emotionally abused in other ways by your partner or ex-partner?: No   Housing Stability: Low Risk  (10/16/2023)    Housing Stability      Do you have housing? : Yes      Are you worried about losing your housing?: No        FAMILY HISTORY:   Family History   Problem Relation Age of Onset     Cancer Paternal Grandfather         colon cancer     Diabetes Maternal Grandmother      Cerebrovascular Disease Maternal Grandmother      Arthritis Maternal Grandmother      Arthritis Mother      Cardiovascular Maternal Grandfather         heart attack x2     Heart Disease Maternal Grandfather         heart attack x2     Gastrointestinal Disease Paternal Grandmother         liver failure     Genitourinary Problems Brother         kidney failure     Cancer Father         lung cancer diagnosed 7/11 due to chemical exposure war     Cancer Maternal Aunt      Asthma No family hx of      C.A.D. No family hx of      Hypertension No family hx of      Breast Cancer No family hx of      Cancer - colorectal No family hx of      Prostate Cancer No family hx of      Alzheimer Disease No family hx of      Blood Disease No family hx of      Circulatory No family hx of      Eye Disorder No family hx of      Lipids No  "family hx of      Musculoskeletal Disorder No family hx of      Neurologic Disorder No family hx of      Respiratory No family hx of      Thyroid Disease No family hx of         EXAM:Vitals: /86 (BP Location: Left arm, Cuff Size: Adult Large)   Temp 97.5  F (36.4  C) (Temporal)   Ht 1.65 m (5' 4.96\")   Wt 110.2 kg (243 lb)   LMP 09/19/2011   BMI 40.49 kg/m    BMI= Body mass index is 40.49 kg/m .    General appearance: Patient is alert and fully cooperative with history & exam.  No sign of distress is noted during the visit.     Psychiatric: Affect is pleasant & appropriate.  Patient appears motivated to improve health.     Respiratory: Breathing is regular & unlabored while sitting.     HEENT: Hearing is intact to spoken word.  Speech is clear.  No gross evidence of visual impairment that would impact ambulation.     Vascular: DP & PT pulses are intact & regular bilaterally.  No significant edema or varicosities noted.  CFT and skin temperature is normal to both lower extremities.     Neurologic: Lower extremity sensation is intact to light touch.  No evidence of weakness or contracture in the lower extremities.  No evidence of neuropathy.    Dermatologic: Skin is intact to both lower extremities with adequate texture, turgor and tone about the integument.  No paronychia or evidence of soft tissue infection is noted.     Musculoskeletal: Patient is ambulatory without assistive device or brace.  Patient is obese.  There is a semifluctuant mass noted over the dorsal lateral right sinus tarsi anterior process of the calcaneus cuboid and lateral cuneiform.  It measures 1-2 cm and is fairly hypersensitive with gentle palpation likely aggravating intermediate dorsal cutaneous nerve    Radiographs 3 views right foot 10/23 demonstrate mild degenerative changes through the midfoot no osteophyte or loose body or severe degeneration.     ASSESSMENT:       ICD-10-CM    1. Ganglion cyst of right foot  M67.471       2. " Foot mass, right  R22.41 Orthopedic  Referral        PLAN:  Reviewed patient's chart in [facility].      10/19/2023   Start compression socks  Change shoes, recommend shoes that will not allow any shearing movement or sliding around in the shoe and reduce flexion of the toes which would aggravate this area and the nerve.  Recommend overall generalized compression of the foot and ankle to see if this will resorb on its own.  Discussed injection and aspiration and what to expect from this as well as limitations of this.  Also discussed surgical excision of a neuroma versus addressing any joint etiology if necessary.  Would recommend an MRI to move forward with any surgical planning if she feels her symptoms warrant surgical treatment or aspiration or not resolved with other conservative options discussed above.  All questions were answered.  Follow-up as needed if this remains symptomatic or disrupting her health or quality of life.      Freeman Mattson DPM      Again, thank you for allowing me to participate in the care of your patient.        Sincerely,        Freeman Mattson DPM

## 2023-11-29 ENCOUNTER — TELEPHONE (OUTPATIENT)
Dept: GASTROENTEROLOGY | Facility: CLINIC | Age: 53
End: 2023-11-29
Payer: COMMERCIAL

## 2023-11-29 NOTE — TELEPHONE ENCOUNTER
"Updated questions     Endoscopy Scheduling Screen    Have you had a positive Covid test in the last 14 days?  No    Are you active on MyChart?   No    What insurance is in the chart?  Other:  BCBS    Ordering/Referring Provider: Ivy Soto MD    (If ordering provider performs procedure, schedule with ordering provider unless otherwise instructed. )    BMI: Estimated body mass index is 40.49 kg/m  as calculated from the following:    Height as of 10/19/23: 1.65 m (5' 4.96\").    Weight as of 10/19/23: 110.2 kg (243 lb).     Sedation Ordered  moderate sedation.   If patient BMI > 50 do not schedule in [facility].    If patient BMI > 45 do not schedule at [facility].    Are you taking methadone or Suboxone?  No    Are you taking any prescription medications for pain 3 or more times per week?   No    Do you have a history of malignant hyperthermia or adverse reaction to anesthesia?  No    (Females) Are you currently pregnant?   No     Have you been diagnosed or told you have pulmonary hypertension?   No    Do you have an LVAD?  No    Have you been told you have moderate to severe sleep apnea?  No    Have you been told you have COPD, asthma, or any other lung disease?  No    Do you have any heart conditions?  No     Have you ever had an organ transplant?   No    Have you ever had or are you awaiting a heart or lung transplant?   No    Have you had a stroke or transient ischemic attack (TIA aka \"mini stroke\" in the last 6 months?   No    Have you been diagnosed with or been told you have cirrhosis of the liver?   No    Are you currently on dialysis?   No    Do you need assistance transferring?   No    BMI: Estimated body mass index is 40.49 kg/m  as calculated from the following:    Height as of 10/19/23: 1.65 m (5' 4.96\").    Weight as of 10/19/23: 110.2 kg (243 lb).     Is patients BMI > 40 and scheduling location [facility]?  No    Do you take an injectable medication for weight loss or diabetes (excluding insulin)?  No    Do you " take the medication Naltrexone?  No    Do you take blood thinners?  No       Prep   Are you currently on dialysis or do you have chronic kidney disease?  No    Do you have a diagnosis of diabetes?  No    Do you have a diagnosis of cystic fibrosis (CF)?  No    On a regular basis do you go 3 -5 days between bowel movements?  Yes (Extended Prep)    BMI > 40?  Yes (Extended Prep)    Preferred Pharmacy:    [facility] [address] [address] [facility]  [address] [facility] [address]  Phone: [phone] Fax: [phone]          Final Scheduling Details   Colonoscopy prep sent?  Golytely Extended Prep    Procedure scheduled  Colonoscopy    Surgeon:  RUSS     Date of procedure:  01/10/2024     Pre-OP / PAC:   No - Not required for this site.    Location  [facility] - Patient preference.    Sedation   MAC/Deep Sedation  Per location      Patient Reminders:   You will receive a call from a Nurse to review instructions and health history.  This assessment must be completed prior to your procedure.  Failure to complete the Nurse assessment may result in the procedure being cancelled.      On the day of your procedure, please designate an adult(s) who can drive you home stay with you for the next 24 hours. The medicines used in the exam will make you sleepy. You will not be able to drive.      You cannot take public transportation, ride share services, or non-medical taxi service without a responsible caregiver.  Medical transport services are allowed with the requirement that a responsible caregiver will receive you at your destination.  We require that drivers and caregivers are confirmed prior to your procedure.

## 2023-12-20 ENCOUNTER — TELEPHONE (OUTPATIENT)
Dept: FAMILY MEDICINE | Facility: OTHER | Age: 53
End: 2023-12-20
Payer: COMMERCIAL

## 2023-12-20 DIAGNOSIS — K62.5 RECTAL BLEEDING: Primary | ICD-10-CM

## 2023-12-20 NOTE — TELEPHONE ENCOUNTER
Reason for Call:  Appointment Request    Patient requesting this type of appt:  yes    Requested provider: Suellen Millard    Reason patient unable to be scheduled: Not within requested timeframe    When does patient want to be seen/preferred time: 1-2 days    Comments: Pt. Is bleeding from rectum and believes it is due to stress. Declined talking to nurse    Okay to leave a detailed message?: Yes at Cell number on file:    Telephone Information:   Mobile [phone]       Call taken on 12/20/2023 at 4:15 PM by Miriam Fuller

## 2023-12-20 NOTE — TELEPHONE ENCOUNTER
Called patient and informed her provider was out until 1/5/24.  She declined scheduling with another provider and scheduled with [facility] on 1/5.

## 2024-01-04 ENCOUNTER — MYC MEDICAL ADVICE (OUTPATIENT)
Dept: SURGERY | Facility: CLINIC | Age: 54
End: 2024-01-04
Payer: COMMERCIAL

## 2024-01-04 DIAGNOSIS — K62.5 RECTAL BLEEDING: Primary | ICD-10-CM

## 2024-01-04 RX ORDER — SODIUM, POTASSIUM,MAG SULFATES 17.5-3.13G
1 SOLUTION, RECONSTITUTED, ORAL ORAL SEE ADMIN INSTRUCTIONS
Qty: 354 ML | Refills: 0 | Status: SHIPPED | OUTPATIENT
Start: 2024-01-04

## 2024-01-04 NOTE — PROVIDER NOTIFICATION
Regarding screening questions and prep choice, pt states that it is rare for her to go more that two days between bowel movement.  In the past, she was unable to finish a GoLytely prep due to gagging.  She has done miralax with success, prior to that.  She requested a switch to Miralax.

## 2024-01-08 NOTE — PROGRESS NOTES
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  She is s/p XL-BSO 7/15/19 which served as her treatment.  She is here today for a surveillance visit.     Oncology History:  Prior vaginal hysterectomy (ovaries in situ) for endometriosis and painful periods  19: MRI of back for back pain  19: CT scan showing large pelvic mass. Ca125 282, CEA 6.9,   7/15/19: XLap/BSO/Appy/Omx/Biopsies/CL. 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   2020:  12.    2020: CT CAP   IMPRESSION:  1. Status post hysterectomy and bilateral salpingo-oophorectomy.  Previously noted large pelvic mass has been removed and there is no  obvious evidence for recurrent malignancy.  2. Diffuse fatty infiltration of the liver.  3. Absent left kidney likely due to prior renal donation.  4. No evidence for metastasis is identified on this study.                  Today she comes to clinic with ongoing perirectal discomfort associated perirectal mass.  She is working with a provider in colon and rectal.  She first noted the mass about 6 weeks ago and was evaluated by her PCP who referred her to them.  She notes that she has increased discomfort and pain after she has a BM.  She will occasionally have blood after wiping.  She is using ibuprofen which is helpful.  She has a pelvic MRI on Friday.  She was also advised by her PCP to be seen in our clinic as she was overdue for follow up for her hx of borderline tumor.  She notes increased bloating, decreased appetite, and early satiety over the last 6 months.  She denies any vaginal bleeding, no changes in her bladder habits, no nausea/emesis, no lower extremity edema, and no  fevers or  Hospitalist Medicine Progress Note   [facility]       Mark Shultz is a 84 year old gentleman with atrial flutter, hypertension, type 2 diabetes mellitus with peripheral neuropathy, hypercholesteremia, hypothyroidism, ABDIEL, BPH, OA, benign cranial nerve tumor, multiple falls, bladder tumor s/p transurethral bladder tumor resection 1/3/2024 who came to [facility] 1/7/2024 with lower abdominal pain, urinary retention and gross hematuria UA showed greater than 100 WBCs and greater than 182 RBCs with moderate leukocyte esterase earlier his urine culture from 1/5/2024 did not grow anything urology was consulted a 20 Irish Pemberton's catheter was inserted at with less hematuria patient continues to have abdominal pain and rectal pain       Date of Admission:  1/7/2024  Assessment & Plan     Mild lower abdominal pain  Urinary retention s/p Pemberton's catheter insertion  Gross hematuria  Acute UTI  Patient continues to have abdominal pain and does not want to go home today.  Will continue ceftriaxone and await urine culture results  Plan is to keep the Pemberton    #. Atrial fibrillation  #. Hypertension   The patient is on metoprolol for rate control strategy, and he is also on Xarelto that he stopped on 1/1/24 due to bloody urine.  -Continue with PTA Metoprolol, Lasix, and Losartan  -Hold Xarelto till the patient has been seen urology and all procedures are done     #. DM type II with peripheral neuropathy  Glucose slightly elevated, in the range of 180-240.  -Monitor blood glucose  -Hold oral antidiabetic agents for possible procedures and imaging  -Sliding scale insulin for correction  -Hypoglycemia protocol  -Continue with PTA gabapentin for neuropathy     #.  Hypothyroidism  -Continue with PTA Synthroid             Plan:   Check BMP in the morning  Follow-up on urine culture  Will keep the Pemberton's in situ at discharge  Discharge in a.m.  Will discuss with urology regarding timing to start  DOAC    Diet: Regular Diet Adult    DVT Prophylaxis: Patient will be started later on DOAC's  Pemberton Catheter: PRESENT, indication: Gross Hematuria, Retention  Code Status: Full Code               The patient's care was discussed with the Patient .    Inocencio Guillaume MD  Hospitalist Service  [facility]    ______________________________________________________________________    Interval History     Symptoms   Abdominal pain is significantly 7 or 10 as well as in the rectal area post catheter placement    Review of Systems:   No fever chills    Data reviewed today: I reviewed all medications, new labs and imaging results over the last 24 hours.     Physical Exam   Vital Signs: Temp: 97.7  F (36.5  C) Temp src: Oral BP: (!) 155/73 Pulse: 72   Resp: 18 SpO2: 98 % O2 Device: None (Room air)    Weight: 0 lbs 0 oz      GENERAL: Patient is not in acute distress  HEENT: EOM+,Conjunctiva is clear   NECK:  no Jugular Venous distention  HEART: S1 S2 regular Rate and Rhythm, there is  no murmur,   LUNGS: Respirations are  not laboured, Lungs are  clear to auscultation without Crepitations or Wheezing   ABDOMEN: Soft , there is no tenderness ,Bowel Sounds are  Positive   LOWER LIMBS: no  Pedal Edema  Bilaterally   CNS:  Alert,  Oriented x 3, Moving all the Four Limbs     Data   Recent Labs   Lab 01/08/24  1515 01/08/24  1305 01/08/24  0854 01/07/24  2259 01/07/24  2050 01/07/24  1738 01/05/24  2355 01/05/24  1500   WBC  --   --  15.7*  --   --  17.0*  --  16.7*   HGB 11.9*  --  11.5*  --   --  11.2*   < > 11.8*   MCV  --   --  99  --   --  100  --  98   PLT  --   --  170  --   --  182  --  169   INR  --   --  1.05  --   --   --   --   --    NA  --   --  139  --  138  --   --  140   POTASSIUM  --   --  4.0  --  3.9  --   --  4.3   CHLORIDE  --   --  104  --  103  --   --  101   CO2  --   --  26  --  25  --   --  27   BUN  --   --  16.8  --  19.7  --   --  24.9*   CR  --   --  1.01  --  1.11  --   --  1.26*  chills, and no chest pain or shortness of breath.  She is overdue for her annual physical but is current with her colon cancer screening and her mammogram.                      Review of Systems:    Systemic           no weight changes; no fever; no chills; no night sweats; no appetite changes  Skin           no rashes, or lesions  Eye           no irritation; no changes in vision  Orlin-Laryngeal           no dysphagia; no hoarseness   Pulmonary    no cough; no shortness of breath  Cardiovascular    no chest pain; no palpitations  Gastrointestinal    no diarrhea; no constipation; no abdominal pain; no changes in bowel habits; no blood in stool  Genitourinary   no urinary frequency; no urinary urgency; no dysuria; no pain; no abnormal vaginal discharge; no abnormal vaginal bleeding  Breast   no breast discharge; no breast changes; no breast pain  Musculoskeletal    no myalgias; no arthralgias; no back pain  Psychiatric           no depressed mood; no anxiety    Hematologic   no tender lymph nodes; no noticeable swellings or lumps   Endocrine    no hot flashes; no heat/cold intolerance         Neurological   no tremor; no numbness and tingling; no headaches; no difficulty sleeping      Past Medical History:    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         Past Surgical History:    Past Surgical History:   Procedure Laterality Date     COLONOSCOPY N/A 7/10/2019    Procedure: COLONOSCOPY;  Surgeon: Sheba Tenorio MD;  [facility]     CYSTOSCOPY  10/24/2011    Procedure:CYSTOSCOPY; Surgeon:NAYELY MADRIGAL; Location:[facility] OR     HC BRAIN MAPPING, 1ST HOUR MD ATTENDANCE  2001    ablation of seizure focus     HC REDUCTION OF LARGE BREAST  2001     HYSTERECTOMY TOTAL ABD,    ANIONGAP  --   --  9  --  10  --   --  12   BREANNE  --   --  8.5*  --  8.7*  --   --  9.0   GLC  --  261* 259* 240* 244*  --   --  182*   ALBUMIN  --   --  3.7  --   --   --   --   --    PROTTOTAL  --   --  6.6  --   --   --   --   --    BILITOTAL  --   --  1.0  --   --   --   --   --    ALKPHOS  --   --  60  --   --   --   --   --    ALT  --   --  9  --   --   --   --   --    AST  --   --  13  --   --   --   --   --     < > = values in this interval not displayed.         No results found for this or any previous visit (from the past 24 hour(s)).     VIR[place]IA SALPINGO-OOPHORECTOMY, NODE DISSECTION, TUMOR DEBULKING, COMBINED Bilateral 7/15/2019    Procedure: Exploratory Laparotomy, Total Abdominal Hysterectomy, Removal Of Both Tubes And Ovaries, Omentectomy, Appendectomy, Peritoneal Biopsies;  Surgeon: Ivy Soto MD;  Location: [facility] OR     HYSTERECTOMY VAGINAL  10/24/2011    Procedure:HYSTERECTOMY VAGINAL; vaginal hysterectomy and cystoscopy; Surgeon:NAYELY MADRIGAL; Location:[facility] OR     HYSTEROSCOPY,ABLATION ENDOMETRIUM  2001     ZZC LAP,DONOR KIDNEY REMOV,LIVING  4/2/2009    Left laparoscopic donor nephrectomy.  Donating to brother. [facility].         Health Maintenance Due   Topic Date Due     MICROALBUMIN  Never done     ADVANCE CARE PLANNING  Never done     COVID-19 Vaccine (1) Never done     DTAP/TDAP/TD IMMUNIZATION (1 - Tdap) Never done     PREVENTIVE CARE VISIT  09/14/2012     HEMOGLOBIN  02/17/2021     BMP  02/21/2021     LIPID  02/21/2021     INFLUENZA VACCINE (1) Never done     ZOSTER IMMUNIZATION (1 of 2) 04/08/2021       Current Medications:     Current Outpatient Medications   Medication Sig Dispense Refill     omeprazole (PRILOSEC) 10 MG DR capsule Take 40 mg by mouth every morning        acetaminophen (TYLENOL) 325 MG tablet Take 2 tablets (650 mg) by mouth every 6 hours (Patient not taking: Reported on 2/21/2020) 12 tablet 0     ibuprofen (ADVIL/MOTRIN) 200 MG capsule Take 400 mg by mouth as needed for fever (Patient not taking: Reported on 3/2/2022)           Allergies:        Allergies   Allergen Reactions     Aspirin Rash     Rash on face        Social History:     Social History     Tobacco Use     Smoking status: Never Smoker     Smokeless tobacco: Never Used     Tobacco comment: no smokers in the household   Substance Use Topics     Alcohol use: No       History   Drug Use No         Family History:     The patient's family history is notable for:    Family History   Problem Relation Age of Onset     Cancer Paternal Grandfather          colon cancer     Diabetes Maternal Grandmother      Cerebrovascular Disease Maternal Grandmother      Arthritis Maternal Grandmother      Arthritis Mother      Cardiovascular Maternal Grandfather         heart attack x2     Heart Disease Maternal Grandfather         heart attack x2     Gastrointestinal Disease Paternal Grandmother         liver failure     Genitourinary Problems Brother         kidney failure     Cancer Father         lung cancer diagnosed 7/11 due to chemical exposure war     Cancer Maternal Aunt      Asthma No family hx of      C.A.D. No family hx of      Hypertension No family hx of      Breast Cancer No family hx of      Cancer - colorectal No family hx of      Prostate Cancer No family hx of      Alzheimer Disease No family hx of      Blood Disease No family hx of      Circulatory No family hx of      Eye Disorder No family hx of      Lipids No family hx of      Musculoskeletal Disorder No family hx of      Neurologic Disorder No family hx of      Respiratory No family hx of      Thyroid Disease No family hx of          Physical Exam:     /80 (BP Location: Right arm, Patient Position: Sitting, Cuff Size: Adult Large)   Pulse 90   Temp 98.2  F (36.8  C) (Oral)   Wt 110.1 kg (242 lb 11.2 oz)   LMP 09/19/2011   SpO2 95%   BMI 40.68 kg/m    Body mass index is 40.68 kg/m .    General Appearance: healthy and alert, no distress     HEENT: no palpable nodules or masses        Cardiovascular: regular rate and rhythm, no gallops, rubs or murmurs     Respiratory: lungs clear, no rales, rhonchi or wheezes    Musculoskeletal: extremities non tender and without edema    Skin: no lesions or rashes     Neurological: normal gait, no gross defects     Psychiatric: appropriate mood and affect                               Hematological: normal cervical, supraclavicular and inguinal lymph nodes     Gastrointestinal:       abdomen soft, non-tender, non-distended, no organomegaly or  masses    Genitourinary: External genitalia and urethral meatus appears normal.  Vagina is smooth without nodularity or masses.  Cervix is surgically absent.  Bimanual exam reveals no masses or nodularity in the vagina.  Recto-vaginal exam reveals perirectal mass and fullness.      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  She is s/p XL-BSO 7/15/19 which served as her treatment.  She is here today for a surveillance visit.     40 minutes spent on the date of the encounter doing chart review, history and exam, documentation, and further activities as noted above.      Plan:     1.) Borderline ovarian:  Strongly recommend follow up with colorectal for her perirectal mass including pelvic MRI as already scheduled.  She does have a pending  and if this is a recurrence her  should be elevated in which case we would order a CT CAP for full evaluation.  She is at low risk for recurrence as she is s/p a BSO.  Assuming all this is negative she can RTC in 6 months for next surveillance visit.  Plan for surveillance visits every 6 months until she is 5 years post treatment (7/2024).  If her  is WDL ok to no longer follow this per Dr. Soto's recommendation to only follow this for 3 years.  Reviewed signs and symptoms for when she should contact the clinic or seek additional care.  Patient to contact the clinic with any questions or concerns in the interim.        Genetic risk factors were assessed and she does not meet qualification for referral.      Labs and/or tests ordered include:  .     2.) Health maintenance:  Issues addressed today include following up with PCP for annual health maintenance and non-gynecologic issues.       Yani Mares, DNP, APRN, FNP-C  Nurse Practitioner  Division of Gynecologic Oncology  Pager: [phone]     CC  Patient Care Team:  Suellen Millard PA-C as PCP -  General (Physician Assistant)  Suellen Millard PA-C as Assigned PCP  SELF, REFERRED

## 2024-01-09 ENCOUNTER — ANESTHESIA EVENT (OUTPATIENT)
Dept: GASTROENTEROLOGY | Facility: CLINIC | Age: 54
End: 2024-01-09
Payer: COMMERCIAL

## 2024-01-09 ASSESSMENT — ENCOUNTER SYMPTOMS: SEIZURES: 1

## 2024-01-09 NOTE — H&P
[facility] Anesthesia Pre-op History and Physical    Sailaja Mcgee MRN# [id number]   Age: 53 year old YOB: 1970      Date of Surgery: 1/10/2024 Location [facility]      Date of Exam 1/10/2024 Facility (In hospital)       Home clinic: [facility]  Primary care provider: Suellen Millard         Chief Complaint and/or Reason for Procedure:   No chief complaint on file.    Colonoscopy. GIB. BRBPR. Nml exam 2019       Active problem list:     Patient Active Problem List    Diagnosis Date Noted    Bilateral carpal tunnel syndrome 03/03/2022     Priority: Medium    Ovarian tumor of borderline malignancy, left 02/24/2022     Priority: Medium    History of seizures 02/18/2020     Priority: Medium    Transient global amnesia 02/16/2020     Priority: Medium    S/P BSO (bilateral salpingo-oophorectomy) 07/15/2019     Priority: Medium    Right lower quadrant abdominal mass 06/25/2019     Priority: Medium    CKD (chronic kidney disease) stage 3, GFR 30-59 ml/min (H) 06/11/2019     Priority: Medium    Obesity (BMI 35.0-39.9) with comorbidity (H) 06/10/2019     Priority: Medium    Anemia 01/11/2014     Priority: Medium    Donor of kidney for transplant 01/09/2014     Priority: Medium    S/P laparoscopic hysterectomy 10/24/2011     Priority: Medium     For menorrhagia.   Cervix and uterus removed.         Dysmenorrhea 09/28/2011     Priority: Medium    Menorrhagia 09/28/2011     Priority: Medium    CARDIOVASCULAR SCREENING; LDL GOAL LESS THAN 160 10/31/2010     Priority: Medium    GERD (gastroesophageal reflux disease) 01/11/2010     Priority: Medium            Medications (include herbals and vitamins):   Any Plavix use in the last 7 days? No     No current facility-administered medications for this encounter.     Current Outpatient Medications   Medication Sig    omeprazole (PRILOSEC OTC) 20 MG EC tablet Take 40 mg by mouth daily    bisacodyl (DULCOLAX) 5 MG EC  tablet 2 days prior to procedure, take 2 tablets at 4 pm. 1 day prior to procedure, take 2 tablets at 4 pm. For additional instructions refer to your colonoscopy prep instructions. (Patient not taking: Reported on 10/16/2023)    Na Sulfate-K Sulfate-Mg Sulf (SUPREP BOWEL PREP KIT) solution Take 177 mLs (1 Bottle) by mouth See Admin Instructions Split dose 2 day Regimen: The evening before you procedure: dilute one bottle with water to a total volume of 16 oz. (up to fill line).  At 6 pm, drink the entire amount.  Drink 32 ounces of water over the next hour. The morning of your procedure repeat both steps above using the second bottle.  Start five hours before you procedure and complete the prep at least three hours before you arrive.    polyethylene glycol (GOLYTELY) 236 g suspension 2 days prior at 5pm, mix and drink half of a jug of Golytely. Drink an 8 oz. glass of Golytely every 15 minutes until half of the jug is gone. Place remainder of Golytely in the refrigerator. 1 day prior at 5 pm, drink the 2nd half of a jug of Golytely bowel prep. 6 hours before your check-in time, drink an 8 oz. glass of Golytely every 15 minutes until half of the 2nd jug of Golytely is gone. Discard remainder of second jug. (Patient not taking: Reported on 10/16/2023)             Allergies:      Allergies   Allergen Reactions    Aspirin Rash     Rash on face     Allergy to Latex? No  Allergy to tape?   No  Intolerances:             Physical Exam:   All vitals have been reviewed  No data found.  No intake/output data recorded.  Lungs:   No increased work of breathing, good air exchange, clear to auscultation bilaterally, no crackles or wheezing     Cardiovascular:   Normal apical impulse, regular rate and rhythm, normal S1 and S2, no S3 or S4, and no murmur noted             Lab / Radiology Results:            Anesthetic risk and/or ASA classification:       Shine Jaeger MD

## 2024-01-10 ENCOUNTER — HOSPITAL ENCOUNTER (OUTPATIENT)
Facility: CLINIC | Age: 54
Discharge: HOME | End: 2024-01-10
Attending: INTERNAL MEDICINE | Admitting: INTERNAL MEDICINE
Payer: COMMERCIAL

## 2024-01-10 ENCOUNTER — ANESTHESIA (OUTPATIENT)
Dept: GASTROENTEROLOGY | Facility: CLINIC | Age: 54
End: 2024-01-10
Payer: COMMERCIAL

## 2024-01-10 VITALS
TEMPERATURE: 98.3 F | BODY MASS INDEX: 41.48 KG/M2 | DIASTOLIC BLOOD PRESSURE: 80 MMHG | RESPIRATION RATE: 18 BRPM | HEART RATE: 71 BPM | SYSTOLIC BLOOD PRESSURE: 108 MMHG | WEIGHT: 243 LBS | HEIGHT: 64 IN | OXYGEN SATURATION: 94 %

## 2024-01-10 LAB — COLONOSCOPY: NORMAL

## 2024-01-10 PROCEDURE — 370N000017 HC ANESTHESIA TECHNICAL FEE, PER MIN: Performed by: INTERNAL MEDICINE

## 2024-01-10 PROCEDURE — 258N000003 HC RX IP 258 OP 636: Performed by: NURSE ANESTHETIST, CERTIFIED REGISTERED

## 2024-01-10 PROCEDURE — 250N000011 HC RX IP 250 OP 636: Performed by: NURSE ANESTHETIST, CERTIFIED REGISTERED

## 2024-01-10 PROCEDURE — 45378 DIAGNOSTIC COLONOSCOPY: CPT | Performed by: INTERNAL MEDICINE

## 2024-01-10 PROCEDURE — 250N000009 HC RX 250: Performed by: NURSE ANESTHETIST, CERTIFIED REGISTERED

## 2024-01-10 RX ORDER — LIDOCAINE HYDROCHLORIDE 20 MG/ML
INJECTION, SOLUTION INFILTRATION; PERINEURAL PRN
Status: DISCONTINUED | OUTPATIENT
Start: 2024-01-10 | End: 2024-01-10

## 2024-01-10 RX ORDER — SODIUM CHLORIDE, SODIUM LACTATE, POTASSIUM CHLORIDE, CALCIUM CHLORIDE 600; 310; 30; 20 MG/100ML; MG/100ML; MG/100ML; MG/100ML
INJECTION, SOLUTION INTRAVENOUS CONTINUOUS
Status: DISCONTINUED | OUTPATIENT
Start: 2024-01-10 | End: 2024-01-10 | Stop reason: HOSPADM

## 2024-01-10 RX ORDER — ONDANSETRON 2 MG/ML
4 INJECTION INTRAMUSCULAR; INTRAVENOUS EVERY 30 MIN PRN
Status: CANCELLED | OUTPATIENT
Start: 2024-01-10

## 2024-01-10 RX ORDER — LIDOCAINE 40 MG/G
CREAM TOPICAL
Status: DISCONTINUED | OUTPATIENT
Start: 2024-01-10 | End: 2024-01-10 | Stop reason: HOSPADM

## 2024-01-10 RX ORDER — ONDANSETRON 4 MG/1
4 TABLET, ORALLY DISINTEGRATING ORAL EVERY 30 MIN PRN
Status: CANCELLED | OUTPATIENT
Start: 2024-01-10

## 2024-01-10 RX ORDER — PROPOFOL 10 MG/ML
INJECTION, EMULSION INTRAVENOUS PRN
Status: DISCONTINUED | OUTPATIENT
Start: 2024-01-10 | End: 2024-01-10

## 2024-01-10 RX ORDER — FENTANYL CITRATE 50 UG/ML
25 INJECTION, SOLUTION INTRAMUSCULAR; INTRAVENOUS
Status: CANCELLED | OUTPATIENT
Start: 2024-01-10

## 2024-01-10 RX ORDER — PROPOFOL 10 MG/ML
INJECTION, EMULSION INTRAVENOUS CONTINUOUS PRN
Status: DISCONTINUED | OUTPATIENT
Start: 2024-01-10 | End: 2024-01-10

## 2024-01-10 RX ADMIN — SODIUM CHLORIDE, POTASSIUM CHLORIDE, SODIUM LACTATE AND CALCIUM CHLORIDE 10 ML/HR: 600; 310; 30; 20 INJECTION, SOLUTION INTRAVENOUS at 11:22

## 2024-01-10 RX ADMIN — PROPOFOL 100 MG: 10 INJECTION, EMULSION INTRAVENOUS at 11:53

## 2024-01-10 RX ADMIN — LIDOCAINE HYDROCHLORIDE 50 MG: 20 INJECTION, SOLUTION INFILTRATION; PERINEURAL at 11:53

## 2024-01-10 RX ADMIN — PROPOFOL 200 MCG/KG/MIN: 10 INJECTION, EMULSION INTRAVENOUS at 11:53

## 2024-01-10 ASSESSMENT — ACTIVITIES OF DAILY LIVING (ADL): ADLS_ACUITY_SCORE: 35

## 2024-01-10 NOTE — DISCHARGE INSTRUCTIONS
[facility]    Home Care Following Endoscopy          Activity:  You have just undergone an endoscopic procedure usually performed with conscious sedation.  Do not work or operate machinery (including a car) for at least 12 hours.    I encourage you to walk and attempt to pass this air as soon as possible.    Diet:  Return to the diet you were on before your procedure but eat lightly for the first 12-24 hours.  Drink plenty of water.  Resume any regular medications unless otherwise advised by your physician.  Please begin any new medication prescribed as a result of your procedure as directed by your physician.     Pain:  You may take Tylenol as needed for pain.  Expected Recovery:  You can expect some mild abdominal fullness and/or discomfort due to the air used to inflate your intestinal tract.     Call Your Physician if You Have:    After Colonoscopy:  Worsening persisting abdominal pain which is worse with activity.  Fevers (>101 degrees F), chills or shakes.  Passage of continued blood with bowel movements.     Any questions or concerns about your recovery, please call [phone] or after hours [phone] ([phone]) Nurse Advice Line.

## 2024-01-10 NOTE — ANESTHESIA PREPROCEDURE EVALUATION
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      Past Surgical History:   Procedure Laterality Date    COLONOSCOPY N/A 7/10/2019    Procedure: COLONOSCOPY;  Surgeon: Sheba Tenorio MD;  [facility]    CYSTOSCOPY  10/24/2011    Procedure:CYSTOSCOPY; Surgeon:NAYELY MADRIGAL; Location:[facility] OR    CYSTOSCOPY N/A 2022    Procedure: cystoscopy,;  Surgeon: Ivy Soto MD;  Location: [facility] OR    CYSTOSCOPY  2022    Procedure: ;  Surgeon: Ivy Soto MD;  Location: [facility] OR    EXAM UNDER ANESTHESIA, FISTULOTOMY RECTUM, COMBINED N/A 2022    Procedure: Exam under anesthesia, drainage of abscess and placement of seton;  Surgeon: Pham Winn MD;  Location: [facility] OR    EXAM UNDER ANESTHESIA, FISTULOTOMY RECTUM, COMBINED  2022    Procedure: ;  Surgeon: Pham Winn MD;  Location: [facility] OR    HC BRAIN MAPPING, 1ST HOUR MD ATTENDANCE      ablation of seizure focus    HC REDUCTION OF LARGE BREAST      HYSTERECTOMY TOTAL ABD, [place] SALPINGO-OOPHORECTOMY, NODE DISSECTION, TUMOR DEBULKING, COMBINED Bilateral 7/15/2019    Procedure: Exploratory Laparotomy, Total Abdominal Hysterectomy, Removal Of Both Tubes And Ovaries, Omentectomy, Appendectomy, Peritoneal Biopsies;  Surgeon: Ivy Soto MD;  Location: [facility] OR    HYSTERECTOMY VAGINAL  10/24/2011    Procedure:HYSTERECTOMY VAGINAL; vaginal hysterectomy and cystoscopy; Surgeon:NAYELY MADRIGAL; Location:[facility] OR    HYSTEROSCOPY,ABLATION ENDOMETRIUM      VAGINECTOMY, ROBOT-ASSISTED N/A 2022    Procedure:  Robotic assisted radical upper vaginectomy, pelvic mass excision,;  Surgeon: Ivy Soto MD;  Location:  OR    [facility] LAP,DONOR KIDNEY REMOV,LIVING  4/2/2009    Left laparoscopic donor nephrectomy.  Donating to brother. [facility].      Allergies   Allergen Reactions    Aspirin Rash     Rash on face      Social History     Tobacco Use    Smoking status: Never     Passive exposure: Never    Smokeless tobacco: Never    Tobacco comments:     no smokers in the household   Substance Use Topics    Alcohol use: Yes     Comment: rarely      Wt Readings from Last 1 Encounters:   10/19/23 110.2 kg (243 lb)        Anesthesia Evaluation   Pt has had prior anesthetic. Type: General.    History of anesthetic complications  - difficult airway.  previous notes suggest not difficult but there were multiple attempts on DL.    ROS/MED HX  ENT/Pulmonary:     (+)     SHEILA risk factors,   obese,                                Neurologic:     (+)       seizures (no seizures for 20+ years), last seizure: 2002,                        Cardiovascular:     (+) Dyslipidemia - -   -  - -                                 Previous cardiac testing   Echo: Date: Results:    Stress Test:  Date: Results:    ECG Reviewed:  Date: 12/21/2022 Results:  Sinus  Rhythm   Low voltage in precordial leads.     ABNORMAL     Cath:  Date: Results:      METS/Exercise Tolerance:     Hematologic:     (+)       history of blood transfusion,         Musculoskeletal:  - neg musculoskeletal ROS     GI/Hepatic:     (+) GERD, Asymptomatic on medication,                  Renal/Genitourinary: Comment: Solitary kidney after donating a 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 in 2009    (+) renal disease, type: CRI, Pt does not require dialysis,           Endo: Comment: Class 3 obesity    (+)               Obesity,       Psychiatric/Substance Use:  - neg psychiatric ROS     Infectious Disease:  - neg infectious disease ROS     Malignancy:   (+) Malignancy, History of Other.Other CA Ovarian, vaginal  tumor status post.    Other: Comment: Borderline ovarian tumor for malignancy            Physical Exam    Airway        Mallampati: III   TM distance: > 3 FB   Neck ROM: full   Mouth opening: > 3 cm    Respiratory Devices and Support         Dental           Cardiovascular   cardiovascular exam normal       Rhythm and rate: regular and normal     Pulmonary   pulmonary exam normal        breath sounds clear to auscultation       Other findings: Lab Test        04/19/22 03/31/22 03/11/22 02/17/20 02/16/20                       1137          1230          1159          0540          1929          WBC          9.7           --          8.5          7.4          9.1           HGB          13.1          --          13.2         12.3         13.1          MCV          90            --          91           84           84            PLT          400           --          364          309          367           INR           --          1.00          --           --          1.07           Lab Test        04/19/22 03/11/22 02/21/20                       1137          1159          1315          NA           139          139          141           POTASSIUM    3.7          3.9          4.2           CHLORIDE     108          108          108           CO2          27           28           29            BUN          16           20           21            CR           1.07*        1.13*        1.13*         ANIONGAP     4            3            4             VINI          8.4*         8.8          8.6           GLC          100*         147*         98                   EKG Interpretation:   Sinus  Rhythm   Low voltage in precordial leads.    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    -  Nonspecific T-abnormality.     ABNORMAL       OUTSIDE LABS:  CBC:   Lab Results   Component Value Date    WBC 8.3 12/27/2022    WBC 9.3 12/21/2022    HGB 13.6 12/27/2022    HGB 13.8 12/21/2022    HCT 42.8 12/27/2022    HCT 42.9  12/21/2022     12/27/2022     12/21/2022     BMP:   Lab Results   Component Value Date     12/27/2022     12/21/2022    POTASSIUM 3.7 12/27/2022    POTASSIUM 3.8 12/21/2022    CHLORIDE 107 12/27/2022    CHLORIDE 108 12/21/2022    CO2 29 12/27/2022    CO2 29 12/21/2022    BUN 22 12/27/2022    BUN 18 12/21/2022    CR 1.2 (H) 06/26/2023    CR 1.15 (H) 12/27/2022     (H) 12/27/2022    GLC 93 12/21/2022     COAGS:   Lab Results   Component Value Date    PTT 32 02/16/2020    INR 1.00 03/31/2022     POC:   Lab Results   Component Value Date     (H) 02/16/2020    HCG Negative 10/24/2011     HEPATIC:   Lab Results   Component Value Date    ALBUMIN 3.3 (L) 06/07/2022    PROTTOTAL 7.8 06/07/2022    ALT 23 06/07/2022    AST 15 06/07/2022    ALKPHOS 112 06/07/2022    BILITOTAL 0.3 06/07/2022     OTHER:   Lab Results   Component Value Date    PH 7.39 07/15/2019    LACT 1.0 07/15/2019    VINI 9.0 12/27/2022    MAG 2.2 06/10/2019    LIPASE 521 (H) 04/27/2009    AMYLASE 126 (H) 04/27/2009    TSH 1.92 06/07/2022    CRP 15.9 (H) 12/21/2022    SED 27 12/21/2022       Anesthesia Plan    ASA Status:  3    NPO Status:  NPO Appropriate    Anesthesia Type: MAC.     - Reason for MAC: chronic cardiopulmonary disease, straight local not clinically adequate, immobility needed   Induction: Intravenous, Propofol.   Maintenance: TIVA.        Consents    Anesthesia Plan(s) and associated risks, benefits, and realistic alternatives discussed. Questions answered and patient/representative(s) expressed understanding.     - Discussed:     - Discussed with:  Patient      - Extended Intubation/Ventilatory Support Discussed: No.      - Patient is DNR/DNI Status: No     Use of blood products discussed: No .     Postoperative Care    Pain management: IV analgesics.   PONV prophylaxis: Background Propofol Infusion     Comments:    Other Comments: The risks and benefits of anesthesia, and the alternatives where  applicable, have been discussed with the patient, and they wish to proceed.           EREN Mccormick CRNA    I have reviewed the pertinent notes and labs in the chart from the past 30 days and (re)examined the patient.  Any updates or changes from those notes are reflected in this note.

## 2024-01-10 NOTE — ANESTHESIA POSTPROCEDURE EVALUATION
Patient: Sailaja Mcgee    Procedure: Procedure(s):  Colonoscopy       Anesthesia Type:  MAC    Note:  Disposition: Outpatient   Postop Pain Control: Uneventful            Sign Out: Well controlled pain   PONV: No   Neuro/Psych: Uneventful            Sign Out: Acceptable/Baseline neuro status   Airway/Respiratory: Uneventful            Sign Out: Acceptable/Baseline resp. status   CV/Hemodynamics: Uneventful            Sign Out: Acceptable CV status   Other NRE: NONE   DID A NON-ROUTINE EVENT OCCUR? No    Event details/Postop Comments:  Pt was happy with anesthesia care.  No complications.  I will follow up with the pt if needed.           Last vitals:  Vitals Value Taken Time   /80 01/10/24 1230   Temp     Pulse 71 01/10/24 1230   Resp 18 01/10/24 1230   SpO2 97 % 01/10/24 1233   Vitals shown include unfiled device data.    Electronically Signed By: EREN Mccormick CRNA  January 10, 2024  12:42 PM

## 2024-01-10 NOTE — ANESTHESIA CARE TRANSFER NOTE
Patient: Sailaja Mcgee    Procedure: Procedure(s):  Colonoscopy       Diagnosis: Ovarian tumor of borderline malignancy, left [D39.12]  Rectal bleeding [K62.5]  Tarry stools [K92.1]  Diagnosis Additional Information: No value filed.    Anesthesia Type:   MAC     Note:    Oropharynx: spontaneously breathing  Level of Consciousness: awake  Oxygen Supplementation: room air    Independent Airway: airway patency satisfactory and stable  Dentition: dentition unchanged  Vital Signs Stable: post-procedure vital signs reviewed and stable  Report to RN Given: handoff report given  Patient transferred to: Phase II    Handoff Report: Identifed the Patient, Identified the Reponsible Provider, Reviewed the pertinent medical history, Discussed the surgical course, Reviewed Intra-OP anesthesia mangement and issues during anesthesia, Set expectations for post-procedure period and Allowed opportunity for questions and acknowledgement of understanding      Vitals:  Vitals Value Taken Time   /80 01/10/24 1230   Temp     Pulse 71 01/10/24 1230   Resp 18 01/10/24 1230   SpO2 97 % 01/10/24 1233   Vitals shown include unfiled device data.    Electronically Signed By: EREN Mccormick CRNA  January 10, 2024  12:41 PM

## 2024-04-17 ENCOUNTER — OFFICE VISIT (OUTPATIENT)
Dept: FAMILY MEDICINE | Facility: OTHER | Age: 54
End: 2024-04-17
Payer: COMMERCIAL

## 2024-04-17 ENCOUNTER — NURSE TRIAGE (OUTPATIENT)
Dept: FAMILY MEDICINE | Facility: OTHER | Age: 54
End: 2024-04-17

## 2024-04-17 VITALS
SYSTOLIC BLOOD PRESSURE: 128 MMHG | OXYGEN SATURATION: 95 % | TEMPERATURE: 97.5 F | HEIGHT: 64 IN | RESPIRATION RATE: 20 BRPM | WEIGHT: 220.5 LBS | HEART RATE: 93 BPM | DIASTOLIC BLOOD PRESSURE: 82 MMHG | BODY MASS INDEX: 37.65 KG/M2

## 2024-04-17 DIAGNOSIS — Z13.220 SCREENING FOR HYPERLIPIDEMIA: ICD-10-CM

## 2024-04-17 DIAGNOSIS — F43.23 ADJUSTMENT DISORDER WITH MIXED ANXIETY AND DEPRESSED MOOD: Primary | ICD-10-CM

## 2024-04-17 DIAGNOSIS — N18.30 STAGE 3 CHRONIC KIDNEY DISEASE, UNSPECIFIED WHETHER STAGE 3A OR 3B CKD (H): ICD-10-CM

## 2024-04-17 DIAGNOSIS — R73.09 ELEVATED GLUCOSE: ICD-10-CM

## 2024-04-17 LAB
ANION GAP SERPL CALCULATED.3IONS-SCNC: 11 MMOL/L (ref 7–15)
BUN SERPL-MCNC: 12.9 MG/DL (ref 6–20)
CALCIUM SERPL-MCNC: 9.5 MG/DL (ref 8.6–10)
CHLORIDE SERPL-SCNC: 104 MMOL/L (ref 98–107)
CHOLEST SERPL-MCNC: 198 MG/DL
CREAT SERPL-MCNC: 1.13 MG/DL (ref 0.51–0.95)
CREAT UR-MCNC: 96.5 MG/DL
DEPRECATED HCO3 PLAS-SCNC: 25 MMOL/L (ref 22–29)
EGFRCR SERPLBLD CKD-EPI 2021: 58 ML/MIN/1.73M2
FASTING STATUS PATIENT QL REPORTED: NO
GLUCOSE SERPL-MCNC: 106 MG/DL (ref 70–99)
HBA1C MFR BLD: 5.7 % (ref 0–5.6)
HDLC SERPL-MCNC: 57 MG/DL
HGB BLD-MCNC: 14.6 G/DL (ref 11.7–15.7)
LDLC SERPL CALC-MCNC: 117 MG/DL
MICROALBUMIN UR-MCNC: <12 MG/L
MICROALBUMIN/CREAT UR: NORMAL MG/G{CREAT}
NONHDLC SERPL-MCNC: 141 MG/DL
POTASSIUM SERPL-SCNC: 4 MMOL/L (ref 3.4–5.3)
SODIUM SERPL-SCNC: 140 MMOL/L (ref 135–145)
TRIGL SERPL-MCNC: 121 MG/DL
TSH SERPL DL<=0.005 MIU/L-ACNC: 1.4 UIU/ML (ref 0.3–4.2)

## 2024-04-17 PROCEDURE — 84443 ASSAY THYROID STIM HORMONE: CPT | Performed by: PHYSICIAN ASSISTANT

## 2024-04-17 PROCEDURE — 99214 OFFICE O/P EST MOD 30 MIN: CPT | Performed by: PHYSICIAN ASSISTANT

## 2024-04-17 PROCEDURE — 82043 UR ALBUMIN QUANTITATIVE: CPT | Performed by: PHYSICIAN ASSISTANT

## 2024-04-17 PROCEDURE — 80048 BASIC METABOLIC PNL TOTAL CA: CPT | Performed by: PHYSICIAN ASSISTANT

## 2024-04-17 PROCEDURE — 82570 ASSAY OF URINE CREATININE: CPT | Performed by: PHYSICIAN ASSISTANT

## 2024-04-17 PROCEDURE — 85018 HEMOGLOBIN: CPT | Performed by: PHYSICIAN ASSISTANT

## 2024-04-17 PROCEDURE — 83036 HEMOGLOBIN GLYCOSYLATED A1C: CPT | Performed by: PHYSICIAN ASSISTANT

## 2024-04-17 PROCEDURE — 36415 COLL VENOUS BLD VENIPUNCTURE: CPT | Performed by: PHYSICIAN ASSISTANT

## 2024-04-17 PROCEDURE — 80061 LIPID PANEL: CPT | Performed by: PHYSICIAN ASSISTANT

## 2024-04-17 RX ORDER — CLONAZEPAM 0.5 MG/1
.25-.5 TABLET ORAL 2 TIMES DAILY PRN
Qty: 10 TABLET | Refills: 0 | Status: SHIPPED | OUTPATIENT
Start: 2024-04-17

## 2024-04-17 RX ORDER — TRAZODONE HYDROCHLORIDE 50 MG/1
50-100 TABLET, FILM COATED ORAL AT BEDTIME
Qty: 60 TABLET | Refills: 1 | Status: SHIPPED | OUTPATIENT
Start: 2024-04-17

## 2024-04-17 ASSESSMENT — ANXIETY QUESTIONNAIRES
7. FEELING AFRAID AS IF SOMETHING AWFUL MIGHT HAPPEN: MORE THAN HALF THE DAYS
GAD7 TOTAL SCORE: 8
2. NOT BEING ABLE TO STOP OR CONTROL WORRYING: MORE THAN HALF THE DAYS
1. FEELING NERVOUS, ANXIOUS, OR ON EDGE: MORE THAN HALF THE DAYS
GAD7 TOTAL SCORE: 8
3. WORRYING TOO MUCH ABOUT DIFFERENT THINGS: MORE THAN HALF THE DAYS
6. BECOMING EASILY ANNOYED OR IRRITABLE: NOT AT ALL
5. BEING SO RESTLESS THAT IT IS HARD TO SIT STILL: NOT AT ALL

## 2024-04-17 ASSESSMENT — PATIENT HEALTH QUESTIONNAIRE - PHQ9
10. IF YOU CHECKED OFF ANY PROBLEMS, HOW DIFFICULT HAVE THESE PROBLEMS MADE IT FOR YOU TO DO YOUR WORK, TAKE CARE OF THINGS AT HOME, OR GET ALONG WITH OTHER PEOPLE: SOMEWHAT DIFFICULT
SUM OF ALL RESPONSES TO PHQ QUESTIONS 1-9: 21
SUM OF ALL RESPONSES TO PHQ QUESTIONS 1-9: 21
5. POOR APPETITE OR OVEREATING: NOT AT ALL

## 2024-04-17 ASSESSMENT — PAIN SCALES - GENERAL: PAINLEVEL: NO PAIN (0)

## 2024-04-17 NOTE — TELEPHONE ENCOUNTER
"S-(situation):     Patient has been having increasing depression last 1.5 months    B-(background):     Patient  talking about divorce.    Patient has moved out of home.    Anxiety attacks, stomachache.    Patient tearful on the phone.    Denies any thoughts of suicide/self harm.    Patient currently with roommate.     A-(assessment):     Patient to be seen within 3 days.    R-(recommendations):     RN assisted in scheduling patient.        Reason for Disposition   Symptoms interfere with work or school    Additional Information   Negative: Patient attempted suicide   Negative: Patient is threatening suicide now   Negative: Violent behavior, or threatening to physically hurt or kill someone   Negative: Patient is very confused (disoriented, slurred speech) and no other adult (e.g., friend or family member) available   Negative: Difficult to awaken or acting very confused (disoriented, slurred speech) and new-onset   Negative: Sounds like a life-threatening emergency to the triager   Negative: Suicide thoughts, threats, attempts, or questions   Negative: Questions or concerns about alcohol use, unhealthy alcohol use, binge drinking, intoxication, or withdrawal   Negative: Questions or concerns about substance use (drug use), unhealthy drug use, intoxication, or withdrawal   Negative: Anxiety is main problem or symptom   Negative: Depression and unable to do any of normal activities (e.g., self care, school, work; in comparison to baseline).   Negative: Very strange or confused behavior   Negative: Patient sounds very sick or weak to the triager   Negative: Fever > 101 F  (38.3 C)   Negative: Sometimes has thoughts of suicide    Answer Assessment - Initial Assessment Questions  1. CONCERN: \"What happened that made you call today?\"      Depression getting worse  not slept 7 days  2. DEPRESSION SYMPTOM SCREENING: \"How are you feeling overall?\" (e.g., decreased energy, increased sleeping or difficulty sleeping, " "difficulty concentrating, feelings of sadness, guilt, hopelessness, or worthlessness)      Hopelessness, can concentrate.  Cry all the time  3. RISK OF HARM - SUICIDAL IDEATION:  \"Do you ever have thoughts of hurting or killing yourself?\"  (e.g., yes, no, no but preoccupation with thoughts about death)    - INTENT:  \"Do you have thoughts of hurting or killing yourself right NOW?\" (e.g., yes, no, N/A)    - PLAN: \"Do you have a specific plan for how you would do this?\" (e.g., gun, knife, overdose, no plan, N/A)      no  4. RISK OF HARM - HOMICIDAL IDEATION:  \"Do you ever have thoughts of hurting or killing someone else?\"  (e.g., yes, no, no but preoccupation with thoughts about death)    - INTENT:  \"Do you have thoughts of hurting or killing someone right NOW?\" (e.g., yes, no, N/A)    - PLAN: \"Do you have a specific plan for how you would do this?\" (e.g., gun, knife, no plan, N/A)       no  5. FUNCTIONAL IMPAIRMENT: \"How have things been going for you overall? 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 SUPPORT: \"Who is with you now?\" \"Who do you live with?\" \"Do you have family or friends who you can talk to?\"       Lives with roommate.  Roommate s there  7. THERAPIST: \"Do you have a counselor or therapist? Name?\"      Trying to get in to see MD  8. STRESSORS: \"Has there been any new stress or recent changes in your life?\"       asking for divorce.   Moved from home  9. ALCOHOL USE OR SUBSTANCE USE (DRUG USE): \"Do you drink alcohol or use any illegal drugs?\"      no  10. OTHER: \"Do you have any other physical symptoms right now?\" (e.g., fever        Chest feels tight,  anxiety attack stomach hurts  11. PREGNANCY: \"Is there any chance you are pregnant?\" \"When was your last menstrual period?\"    Protocols used: Depression-A-[place]      Jer Matthew RN, BSN, PHN  [facility]

## 2024-04-17 NOTE — PATIENT INSTRUCTIONS
- Consider counseling, they will call to schedule.   - Start Trazodone, take 1 tablet nightly, you can increase to 2 if needed.   - If you need to for severe anxiety or still unable to sleep take 1/2 tablet to 1 tablet of the Clonazepam if needed. No driving on medication.

## 2024-04-17 NOTE — PROGRESS NOTES
Assessment & Plan     Adjustment disorder with mixed anxiety and depressed mood  Unfortunately she is struggling with her marriage, there are a lot of stressors right now. Suspect her chest pain, palpitations, racing heart are due to anxiety and lack of sleep, no concerns on her vitals or with her examination today.  Did obtain some labs to make sure no concerns.   Will start by treating her sleep first as this will hopefully help her depression/anxiety and also be helpful just because the medication can be utilized for these conditions.  We discussed black box warning and potential side effects, we discussed how to take it. Now if this is not enough for her did prescribe some clonazepam, she generally wants to avoid any controlled substances but feel that this is important for her to have if having higher anxiety or despite the trazodone she isn't able to sleep. Once we get her sleeping better we can decide if she needs additional medication. Highly encouraged her to start counseling, she will think about this. Encouraged couples counseling as well even if they go forward with a divorce to work through things. She feels comfortable talking with family and friends if she were to feel suicidal or calling 911.   We discussed risks with benzodiazepines including long-term risks if using regularly and also dependency, abuse and overdose risks and drug interactions.   - TSH with free T4 reflex; Future  - traZODone (DESYREL) 50 MG tablet; Take 1-2 tablets ( mg) by mouth at bedtime  - Adult Mental Health  Referral; Future  - clonazePAM (KLONOPIN) 0.5 MG tablet; Take 0.5-1 tablets (0.25-0.5 mg) by mouth 2 times daily as needed for anxiety (and bedtime as needed)  - TSH with free T4 reflex    Stage 3 chronic kidney disease, unspecified whether stage 3a or 3b CKD (H)  Rechecking.   - Basic metabolic panel  (Ca, Cl, CO2, Creat, Gluc, K, Na, BUN); Future  - Albumin Random Urine Quantitative with Creat Ratio;  Future  - Hemoglobin; Future  - Basic metabolic panel  (Ca, Cl, CO2, Creat, Gluc, K, Na, BUN)  - Albumin Random Urine Quantitative with Creat Ratio  - Hemoglobin    Elevated glucose  Rechecking.   - Hemoglobin A1c; Future  - Hemoglobin A1c    Screening for hyperlipidemia  Rechecking.   - Lipid panel reflex to direct LDL Fasting; Future  - Lipid panel reflex to direct LDL Fasting      Depression Screening Follow Up        4/17/2024     9:52 AM   PHQ   PHQ-9 Total Score 21   Q9: Thoughts of better off dead/self-harm past 2 weeks Not at all     Options for treatment and follow-up care were reviewed with the patient and/or guardian. Patient and/or guardian engaged in the decision making process and verbalized understanding of the options discussed and agreed with the final plan.     Aquiles Menard is a 54 year old, presenting for the following health issues:  Depression      4/17/2024    10:08 AM   Additional Questions   Roomed by Dorota     History of Present Illness       Reason for visit:  Deprson and not sleeping    She eats 0-1 servings of fruits and vegetables daily.She consumes 1 sweetened beverage(s) daily.She exercises with enough effort to increase her heart rate 20 to 29 minutes per day.  She exercises with enough effort to increase her heart rate 7 days per week.   She is taking medications regularly.         Abnormal Mood Symptoms  Onset/Duration: 1 month  Description: feeling down, unable to sleep, tight feeling in chest  Depression (if yes, do PHQ-9): YES  Anxiety (if yes, do ALONDRA-7): YES  Accompanying Signs & Symptoms:  Still participating in activities that you used to enjoy: YES  Fatigue: YES  Irritability: No  Difficulty concentrating: YES  Changes in appetite: YES  Problems with sleep: YES  Heart racing/beating fast: YES  Abnormally elevated, expansive, or irritable mood: No  Persistently increased activity or energy: No  Thoughts of hurting yourself or others: No  History:  Recent stress or major  "life event: YES  Prior depression or anxiety: None  Family history of depression or anxiety: No  Alcohol/drug use: No  Difficulty sleeping: YES  Precipitating or alleviating factors: None  Therapies tried and outcome: none      4/17/2024     9:52 AM   PHQ   PHQ-9 Total Score 21   Q9: Thoughts of better off dead/self-harm past 2 weeks Not at all          No data to display              - Around Nohemi time her  came home after being on the road for work and told her that he was thinking of  and at that same time he asked his goddaughter if he could ask Sailaja's best friend out on a date.  They reconciled and wanted to work on it. Things were going very well while he was off work but 4 weeks ago he went back to work on the road and there were talks of divorce.  He is still undecided he is somewhat sounding like he is willing to work on their marriage. She has support from friends and family but she loves her  and is also financially attached to him so there are multiple stressors with this.   - She hasn't slept well for the last 10 days, really bad recently.  She is depressed, has never dealt with depression or anxiety in the past this all just started around [place] this last year. She is not suicidal but she just thinks she would be better off not alive or wishes she wasn't alive but would not do anything, doesn't have any plan.    - She is noticing some chest pain, shortness of breath, palpitations.  - No alcohol use, no recreational drug use.        Review of Systems  Constitutional, neuro, pulmonary, cardiac,  and psychiatric systems are negative, except as otherwise noted.      Objective    /82   Pulse 93   Temp 97.5  F (36.4  C) (Temporal)   Resp 20   Ht 1.626 m (5' 4\")   Wt 100 kg (220 lb 8 oz)   LMP 09/19/2011   SpO2 95%   BMI 37.85 kg/m    Body mass index is 37.85 kg/m .  Physical Exam   GENERAL: alert and no distress  RESP: lungs clear to auscultation - no rales, " rhonchi or wheezes  CV: regular rate and rhythm, normal S1 S2, no S3 or S4, no murmur, click or rub, no peripheral edema  MS: no gross musculoskeletal defects noted, no edema  PSYCH: mentation appears normal, affect flat, tearful, and judgement and insight intact    Results for orders placed or performed in visit on 04/17/24   Hemoglobin A1c     Status: Abnormal   Result Value Ref Range    Hemoglobin A1C 5.7 (H) 0.0 - 5.6 %   Hemoglobin     Status: Normal   Result Value Ref Range    Hemoglobin 14.6 11.7 - 15.7 g/dL           Signed Electronically by: Suellen Millard PA-C

## 2024-04-23 ENCOUNTER — TELEPHONE (OUTPATIENT)
Dept: FAMILY MEDICINE | Facility: OTHER | Age: 54
End: 2024-04-23
Payer: COMMERCIAL

## 2024-04-23 NOTE — TELEPHONE ENCOUNTER
Called Ilene. Things are slightly improved. She has tried Clonazepam a couple of times and it really helped. She was worried to take the Trazodone due to potential risk of dependency. We discussed that that medication is not habit forming and it is safer for her so encouraged that one nightly and then using Clonazepam PRN.   She will reach out if she has any questions or concerns.   She has counseling set up for next month.    Suellen Millard PA-C

## 2024-08-28 NOTE — PROGRESS NOTES
"GYN Oncology Follow Up Visit    Referring provider:    Bert Recinos MD  [address] [place] [address]   RE: Sailaja Mcgee  : 1970  ROGER: 2022       CC: pelvic mass, history of borderline mucinous tumor.     HPI: Ms Sailaja Mcgee is a 49 year old year old female who presents for follow up of her borderline mucinous tumor and new symtpoms.       She noted a \"lump\" on the inside of her anus  that hurts when she had a bowel movement. She reports this was evaluated by CR surgery for this and now has plans for a fistula repair (notes from CR not in our system). Colorectal surgery ordered an MRI which showed a mass in the lower pelvis/upper vagina.     She then saw my partner, Dr Diez, while I was away who ordered an IR biopsy. The biopsy was non diagnostic.     Today, She reports bladder urgency and some urge to void but then cannot void. She notes some pink mucous when she wipes. She is now having some more red discharge.       Treatment history:   19: MRI of back for back pain  19: CT scan showing large pelvic mass. Ca125 282, CEA 6.9,   7/15/19: XLap/BSO/Appy/Omx/Biopsies/CL. 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   3/4/2022: Pelvic MRI (for rectal symptoms)   3/11/2022: CT C/A/P with complex cystic solid mass of low hemipelvis 5.4cm x 3.5cm. Stable lymphadenopathy.   3/31/22: IR guided biopsy, benign (scant tissue)       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       Past Surgical History:   Procedure Laterality Date     COLONOSCOPY N/A 7/10/2019    Procedure: COLONOSCOPY;  Surgeon: Sheba Tenorio MD;  Location:  OR     " [FreeTextEntry1] : Prostate Cancer Sky 4+5=9 Diagnosed 7/5/24 PS[facility] PET (8/6/24): Uptake ine prostate, right pelvic side wall and bone mets in axial and appendicular Follows with Dr Reyonlds Symptoms: frequent urination until May 2024- he became obstructed s/p Foleys  No pain Discussed at length about the diagnosis, work up, staging, prognosis and treatment options Explained to the patient and the family that he has metastatic prostate cancer which is essentially incurable.  However it is highly treatable. Discussed about the PEACE 1 trial which showed combination of Lupron with docetaxel and abiraterone in metastatic castration sensitive prostate cancer improved overall survival and radiologic progression free survival. We also reviewed the stampede trial, which showed in patients with metastatic prostate cancer ADT plus abiraterone significantly improved overall survival and failure free survival compared to ADT alone He and his family wants to opt for a nonchemo containing regimen Discussed about ADT -> risks, benefits and side effects including fatigue, hot flashes, weight gain, mood swings, decreased libido, erectile dysfunction, increased risk of heart disease and strokes. Also discussed about risk of blood clots generalized bodyaches, myalgia, arthralgia, stiffness and osteoporosis Also discussed about bone agent for bone metastasis after he obtains dental clearance. Plan: Send blood work including CBC, CMP, PSA, testosterone Abiraterone 1000 mg p.o. prednisone 5 mg daily Lupron: He received his first dose of 22.5 mg with Dr. Reynolds on 8/16/2024.  He will continue every 3 months Lupron with his urologist Russel every 3 months  Lifestyle changes: Discussed about lifestyle changes especially given increased risk of cardiac issues, stroke with ADT.  Advised to aim for a goal of 150 minutes/week of cardio.  This can include walking.  He can start of by walking for 5 minutes a day and gradually increase it to achieve the goal. Discussed at length about the diet especially trying whole food plant-based diet for 1 to 3 months. Patient can continue longer if desired. Available evidence about the benefits reviewed and discussed with the patient.  Resources in the form of books, websites, documentaries given to the patient in writing.  Addendum: Towards the end of the encounter, patient started feeling dizzy, became diaphoretic and had a brief [3 to 5 seconds, of syncopal episode.  He did eat his breakfast in the morning Blood pressure was 90s over 50s.  He soon came back and reports that he already started to feel better.  Also received orange juice.  Rapid response was called and he was sent to the emergency room.  Case endorsed to the ED teamteam  Social Hx Lives in [place], [place] with wife Retired  at [employer] Smoked from 14yo to 50yo Social ETOH user Never illicit drugs.  Patient, wife [Nurys], son [Naveen] had multiple questions which were answered to satisfaction  Follow-up in a few weeks No labs  . CYSTOSCOPY  10/24/2011    Procedure:CYSTOSCOPY; Surgeon:NAYELY MADRIGAL; Location:[facility] OR     HC BRAIN MAPPING, 1ST HOUR MD ATTENDANCE      ablation of seizure focus     HC REDUCTION OF LARGE BREAST       HYSTERECTOMY TOTAL ABD, [place] SALPINGO-OOPHORECTOMY, NODE DISSECTION, TUMOR DEBULKING, COMBINED Bilateral 7/15/2019    Procedure: Exploratory Laparotomy, Total Abdominal Hysterectomy, Removal Of Both Tubes And Ovaries, Omentectomy, Appendectomy, Peritoneal Biopsies;  Surgeon: Ivy Soto MD;  Location: [facility] OR     HYSTERECTOMY VAGINAL  10/24/2011    Procedure:HYSTERECTOMY VAGINAL; vaginal hysterectomy and cystoscopy; Surgeon:NAYELY MADRIGAL; Location:[facility] OR     HYSTEROSCOPY,ABLATION ENDOMETRIUM       ZZC LAP,DONOR KIDNEY REMOV,LIVING  2009    Left laparoscopic donor nephrectomy.  Donating to brother. [facility].        OBGYN history and Health Maintenance:    Last Pap Smear: , NILM prior to hysterectomy  Last Mammogram:never  Last Colonoscopy: 2019 at [facility] prior to surgery, normal    Current Outpatient Medications   Medication Sig Dispense Refill     acetaminophen (TYLENOL) 325 MG tablet Take 2 tablets (650 mg) by mouth every 6 hours 12 tablet 0     Bioflavonoid Products (VITAMIN C) CHEW Take 1 tablet by mouth daily       cephALEXin (KEFLEX) 500 MG capsule Take 1 capsule (500 mg) by mouth 2 times daily for 7 days 14 capsule 0     metroNIDAZOLE (METROGEL) 0.75 % external gel Apply topically 2 times daily 45 g 0     mvw complete formulation (CHEWABLES) tablet Take 1 tablet by mouth daily       omeprazole (PRILOSEC) 10 MG DR capsule Take 40 mg by mouth every morning        UNABLE TO FIND Patient doesn't know name of medication but uses a cream for buttocks              Allergies   Allergen Reactions     Aspirin Rash     Rash on face        Social History:  Social History     Tobacco Use     Smoking status: Never Smoker     Smokeless tobacco: Never Used     Tobacco comment: no smokers in  the household   Substance Use Topics     Alcohol use: Yes     Comment: rarely     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 Linear speech, appropriate affect  Pulmonary:  Normal respiratory effort  GI: Large vertical midline incision well healed. No obvious hernia. Obese.    Lymph: No enlarge lymph nodes in neck or groin  :NEFG some dark vaginal drainage. On speculum exam she has a 2cm upper vagina polyp. Bright red. Biopsy obtained with forceps. Brisk bleeding well controlled with Monsels solution and pressure. On BME I can feel this polypoid area and cannot obviously feel the upper vaginal mass. Limited due to habitus.   On rectal exam she has external hemorrhoids, I cannot appreciate an obvious abscess or fistula (no anal speculum exam performed)     Labs/imaging (Personally reviewed today,  4/21/2022)    CT 3/11/2022:   IMPRESSION:  1.  Complex cystic and mixed cystic and solid mass low hemipelvis,  slightly to the left of midline, which measures up to 5.4 cm AP x 3.5  cm transverse x 3.6 cm SI. This approximates the posterior wall of the  urinary bladder; it is difficult to determine whether it invades the  urinary bladder. It is highly suspect for recurrence of ovarian  neoplasm.   2.  Stable pattern of subcentimeter retroperitoneal and  extraperitoneal lymph nodes. No new lymphadenopathy.  3.  Left nephrectomy.  4.  Atherosclerosis.  5.  Hepatic steatosis.  6.   Sequela of remote granulomatous infection.  7.  Small hiatal hernia.     [Access Center: Complex pelvic mass concerning for ovarian neoplasm  recurrence]     This report will be copied to the [facility] to ensure a  provider acknowledges the finding. Access Center is available Monday  through Friday 8am-3:30 pm.      ANICETO KAPOOR MD     MRI 3/4/22 (summarized outside read)  Fluid edema and inflammation of low intersphincteric space, presumabley represents the reported fistula although no communication with the anal canal is seen. (see full report scanned in). Also complex cyst tethered to superior aspect of the vaginal cuff and displays mass effect on the posterior bladder           Assessment:    Sailaja Mcgee is a 52 year old woman with a history of stage 1C3 borderline mucinous ovarian neoplasm now with a pelvic and vaginal mass       Plan:     1.)   Stage 1C borderline mucinous ovarian tumor: Now with both rectal and vaginal symptoms. She is seeing colorectal surgery. I cannot see these notes but she is having a fistula repair tomorrow. I have reviewed imaging, labs and also examined the patient. She has a concerning vaginal lesion that is likely causing her vagina drainage. Pathology pending. MRI is concerning for cystic lesion in upper vagina. This is possible recurrence of her borderline tumor (versus inclusion cysts). Given vaginal findings, I am very worried about recurrence.  is normal (previously was elevated)    -Followup vaginal pathology  -Will discuss MR findings with radiology. Consider repeat MR with vaginal gel  - If recurrence, would plan PET CT scan and then consider surgery vs systemic therapy (all path depending) . Discussed these options with her today.   - followup w me in 2 weeks for treatment planning      2.) Genetic risk factors were assessed: referral not indicated at this time    3.) Anal fistula: not appreciated on my exam. Ok to proceed with surgery with CR  tomorrow. I do no think this is obviously related to the pelvic mass.     55 minutes total time today including reviewing labs, imaging, exam, review of OSH records, documentation, counseling. Biopsy took 6 minutes total time.       Ivy Soto MD    Department of Ob/Gyn and Women's Health  Division of Gynecologic Oncology  [facility]  [phone]

## 2024-10-15 ENCOUNTER — OFFICE VISIT (OUTPATIENT)
Dept: FAMILY MEDICINE | Facility: OTHER | Age: 54
End: 2024-10-15
Payer: COMMERCIAL

## 2024-10-15 ENCOUNTER — NURSE TRIAGE (OUTPATIENT)
Dept: FAMILY MEDICINE | Facility: OTHER | Age: 54
End: 2024-10-15

## 2024-10-15 VITALS
RESPIRATION RATE: 18 BRPM | WEIGHT: 196 LBS | SYSTOLIC BLOOD PRESSURE: 118 MMHG | BODY MASS INDEX: 33.46 KG/M2 | HEIGHT: 64 IN | HEART RATE: 86 BPM | DIASTOLIC BLOOD PRESSURE: 80 MMHG | TEMPERATURE: 97.6 F | OXYGEN SATURATION: 99 %

## 2024-10-15 DIAGNOSIS — Z72.820 POOR SLEEP: ICD-10-CM

## 2024-10-15 DIAGNOSIS — R63.4 WEIGHT LOSS: ICD-10-CM

## 2024-10-15 DIAGNOSIS — R63.0 DECREASED APPETITE: ICD-10-CM

## 2024-10-15 DIAGNOSIS — N18.31 STAGE 3A CHRONIC KIDNEY DISEASE (H): ICD-10-CM

## 2024-10-15 DIAGNOSIS — Z63.79 STRESSFUL LIFE EVENT AFFECTING FAMILY: ICD-10-CM

## 2024-10-15 DIAGNOSIS — R42 LIGHTHEADEDNESS: Primary | ICD-10-CM

## 2024-10-15 PROBLEM — G43.109 MIGRAINE WITH AURA, NOT INTRACTABLE, WITHOUT STATUS MIGRAINOSUS: Status: ACTIVE | Noted: 2020-03-25

## 2024-10-15 LAB
ALBUMIN SERPL BCG-MCNC: 4 G/DL (ref 3.5–5.2)
ALP SERPL-CCNC: 109 U/L (ref 40–150)
ALT SERPL W P-5'-P-CCNC: 15 U/L (ref 0–50)
ANION GAP SERPL CALCULATED.3IONS-SCNC: 10 MMOL/L (ref 7–15)
AST SERPL W P-5'-P-CCNC: 20 U/L (ref 0–45)
BILIRUB SERPL-MCNC: 0.3 MG/DL
BUN SERPL-MCNC: 15.7 MG/DL (ref 6–20)
CALCIUM SERPL-MCNC: 9.2 MG/DL (ref 8.8–10.4)
CHLORIDE SERPL-SCNC: 105 MMOL/L (ref 98–107)
CREAT SERPL-MCNC: 1.17 MG/DL (ref 0.51–0.95)
EGFRCR SERPLBLD CKD-EPI 2021: 56 ML/MIN/1.73M2
ERYTHROCYTE [DISTWIDTH] IN BLOOD BY AUTOMATED COUNT: 13.5 % (ref 10–15)
GLUCOSE SERPL-MCNC: 100 MG/DL (ref 70–99)
HCO3 SERPL-SCNC: 26 MMOL/L (ref 22–29)
HCT VFR BLD AUTO: 42 % (ref 35–47)
HGB BLD-MCNC: 13.6 G/DL (ref 11.7–15.7)
MAGNESIUM SERPL-MCNC: 2.2 MG/DL (ref 1.7–2.3)
MCH RBC QN AUTO: 29.2 PG (ref 26.5–33)
MCHC RBC AUTO-ENTMCNC: 32.4 G/DL (ref 31.5–36.5)
MCV RBC AUTO: 90 FL (ref 78–100)
PLATELET # BLD AUTO: 327 10E3/UL (ref 150–450)
POTASSIUM SERPL-SCNC: 4.2 MMOL/L (ref 3.4–5.3)
PROT SERPL-MCNC: 7.8 G/DL (ref 6.4–8.3)
RBC # BLD AUTO: 4.66 10E6/UL (ref 3.8–5.2)
SODIUM SERPL-SCNC: 141 MMOL/L (ref 135–145)
WBC # BLD AUTO: 8.2 10E3/UL (ref 4–11)

## 2024-10-15 PROCEDURE — 93000 ELECTROCARDIOGRAM COMPLETE: CPT

## 2024-10-15 PROCEDURE — 83735 ASSAY OF MAGNESIUM: CPT

## 2024-10-15 PROCEDURE — 36415 COLL VENOUS BLD VENIPUNCTURE: CPT

## 2024-10-15 PROCEDURE — 80053 COMPREHEN METABOLIC PANEL: CPT

## 2024-10-15 PROCEDURE — 99215 OFFICE O/P EST HI 40 MIN: CPT

## 2024-10-15 PROCEDURE — 99417 PROLNG OP E/M EACH 15 MIN: CPT

## 2024-10-15 PROCEDURE — 85027 COMPLETE CBC AUTOMATED: CPT

## 2024-10-15 PROCEDURE — 82607 VITAMIN B-12: CPT

## 2024-10-15 ASSESSMENT — PAIN SCALES - GENERAL: PAINLEVEL: NO PAIN (0)

## 2024-10-15 NOTE — PROGRESS NOTES
Assessment & Plan     Lightheadedness  Patient is a 54 year-old female with stage 3 CKD presenting with concerns of intermittent lightheadedness for the past 3 days. Differential diagnoses include but are not limited to electrolyte/vitamin deficiency, anemia, orthostatic hypotension, dysrhythmia, BPPV, etc. Serologic testing as ordered below for further evaluation. EKG- normal sinus rhythm without ectopy or ST/T-wave abnormalities, unchanged from previous tracing. Orthostatic vitals completed with normal findings. Neurologically intact without deficits. [place] Hallpike maneuver did not elicit nystagmus or vertiginous symptoms bilaterally. Labs pending at this time. Discussed signs and symptoms warranting immediate medical evaluation. Patient understands and is agreeable to plan as discussed in clinic.  - Comprehensive metabolic panel (BMP + Alb, Alk Phos, ALT, AST, Total. Bili, TP); Future  - CBC with platelets; Future  - EKG 12-lead complete w/read - Clinics  - Orthostatic blood pressure and pulse  - Vitamin B12; Future  - Magnesium; Future    Decreased appetite  Diet consistently mainly of protein. Advised to eat small frequent meals throughout the day and to attempt incorporating fruits and vegetable. Given poor diet, plan to check vitamin B12 and magnesium, results pending.   - Vitamin B12; Future  - Magnesium; Future    Weight loss  Has sustained a nearly 50 pound weight loss since January 2024. Likely related to decreased appetite secondary to depression given significant life events.     Poor sleep  Did not tolerate previously prescribed trazodone or clonazepam. Recommended OTC sleep aids in the form of melatonin, magnesium glycinate, and sleepy time tea. Discussed sleep hygiene practices.     Stressful life event affecting family  Currently going through a divorce which has been difficult. Feels that mental health is stable at this time. Good support in friends and family.     Stage 3a chronic kidney disease  (H)  Historically stable. S/p nephrectomy. Donated left 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.     I spent a total of 55 minutes on the day of the visit.   Time spent by me doing chart review, history and exam, documentation and further activities per the note    Subjective   Sailaja is a 54 year old, presenting for the following health issues:  Dizziness and Jaundice      10/15/2024    11:12 AM   Additional Questions   Roomed by Kristi LINDSAY   Accompanied by Girlfriend Marita     History of Present Illness       Reason for visit:  Dizziness   She is taking medications regularly.     Dizziness  Onset/Duration: 3 days  Description:   Do you feel faint: No  Does it feel like the surroundings (bed, room) are moving: No  Unsteady/off balance: No  Have you passed out or fallen: YES  Intensity: mild  Progression of Symptoms: worsening  Accompanying Signs & Symptoms:  Heart palpitations or chest pain: YES- comes and goes  Nausea, vomiting: Yes- dry hives in the morning when waking   Weakness or lack of coordination in arms or legs: No  Vision or speech changes: YES- Vision eye sight is off when gets lightheaded  Numbness or tingling: Yes in the legs but is not new  Ringing in ears (Tinnitus): No  Hearing Loss: No  History:   Head trauma/concussion history: No  Previous similar symptoms: No  Recent bleeding history: YES- has been seeing light pink blood when she wipes on sure if bleeding issues   Any new medications (BP?): No  Precipitating factors:   Worse with activity: YES  Worse with head movement: No  Alleviating factors:   Does staying in a fixed position give relief: no   Therapies tried and outcome: None    Presents with concerns of intermittent lightheadedness that has been ongoing for a nearly 3 days. Promoted to make an appointment today due to falling this morning due to lightheadedness. Lightheadedness symptoms last a few seconds to minutes and occurring intermittently throughout the day. Denies symptoms being triggered by head/eye  "movement or position changes. Associated vision \"fuzziness\" when lightheaded symptoms are present. States that eyes will refocus and vision symptom will improve.     A friend told her she looked slightly yellow this morning and should be evaluated. Has a non-traumatic bruise on sternum and lower extremity that was noted a few days ago.     Denies palpitation, exertional chest pain, dyspnea, orthopnea, changes in bowel habits, or abnormal blood loss (vaginal or rectal).     Has been going through a divorce which has been difficulty. Feels that mental health is stable and improving. Has a good support system in friends and family. Recently started going to [employer] again. Sleep remains poor. PCP prescribed trazodone and clonazepam. Did not like how medications made her feel, so discontinued.         Objective    /80   Pulse 86   Temp 97.6  F (36.4  C) (Temporal)   Resp 18   Ht 1.636 m (5' 4.41\")   Wt 88.9 kg (196 lb)   LMP 09/19/2011   SpO2 99%   BMI 33.22 kg/m    Body mass index is 33.22 kg/m .  Physical Exam  Vitals reviewed.   Constitutional:       General: She is not in acute distress.     Appearance: Normal appearance. She is not ill-appearing.   HENT:      Head: Normocephalic and atraumatic.      Right Ear: Tympanic membrane, ear canal and external ear normal.      Left Ear: Tympanic membrane, ear canal and external ear normal.      Ears:      Comments: Negative for middle ear effusion, TM injection, bulging, and erythema bilaterally.     Nose: No congestion or rhinorrhea.      Mouth/Throat:      Mouth: Mucous membranes are moist.      Pharynx: Oropharynx is clear. No oropharyngeal exudate or posterior oropharyngeal erythema.   Eyes:      Extraocular Movements: Extraocular movements intact.      Right eye: No nystagmus.      Left eye: No nystagmus.      Conjunctiva/sclera: Conjunctivae normal.      Right eye: Right conjunctiva is not injected. No exudate.     Left eye: Left conjunctiva is not " injected. No exudate.     Pupils: Pupils are equal, round, and reactive to light.   Neck:      Vascular: No carotid bruit.   Cardiovascular:      Rate and Rhythm: Normal rate and regular rhythm.      Heart sounds: Normal heart sounds, S1 normal and S2 normal. No murmur heard.  Pulmonary:      Effort: Pulmonary effort is normal.      Breath sounds: Normal breath sounds. No wheezing.      Comments: Negative for adventitious breath sounds in all lung fields.  Musculoskeletal:      Right lower leg: No edema.      Left lower leg: No edema.   Lymphadenopathy:      Cervical: No cervical adenopathy.   Skin:     General: Skin is warm and dry.      Capillary Refill: Capillary refill takes less than 2 seconds.      Findings: No lesion or rash.   Neurological:      General: No focal deficit present.      Mental Status: She is alert and oriented to person, place, and time.      GCS: GCS eye subscore is 4. GCS verbal subscore is 5. GCS motor subscore is 6.      Cranial Nerves: Cranial nerves 2-12 are intact. No cranial nerve deficit, dysarthria or facial asymmetry.      Sensory: Sensation is intact. No sensory deficit.      Motor: Motor function is intact. No weakness or pronator drift.      Coordination: Coordination is intact. Romberg sign negative. Coordination normal. Finger-Nose-Finger Test normal.      Gait: Gait is intact.      Comments: [place] Hallpike negative, did not elicit nystagmus of vertiginous symptoms bilaterally.    Psychiatric:         Attention and Perception: Attention and perception normal.         Mood and Affect: Mood normal. Affect is tearful.        EKG - Reviewed and interpreted by me appears normal, NSR, normal axis, normal intervals, no acute ST/T changes c/w ischemia, no LVH by voltage criteria, unchanged from previous tracings  Labs pending        Signed Electronically by: EREN Reyes CNP

## 2024-10-15 NOTE — PATIENT INSTRUCTIONS
I am concerned that the ongoing lightheadedness is related to the recent weight loss and dietary changes.     Please continue increasing fluid intake and eating small frequent meals throughout the day. Try to incorporate fruits and vegetables into daily diet.     Today we are going to complete labs for further evaluation of symptoms along with an EKG to look at heart rate and rhythm.     I will call with abnormal results or sent a letter to your home address.     As you did not tolerate the prescribed trazodone or clonazepam for sleep, I would recommend using over the counter melatonin. Melatonin is a hormone our brain releases to regulate our sleep wake cycles. I would start with a 3-5 mg dose at bedtime. I would also recommend using a sleepy time tea to help with sleep. You could also you magnesium glycinate to help with sleep and relax muscles.

## 2024-10-15 NOTE — TELEPHONE ENCOUNTER
"Patient has a yellow bruise on her leg and one on her breast.    She states she has been dizzy and she was told she looks more yellow than normal.    Patient only has one kidney.  She has had a fall today.  Her eyes are not yellowish.  She does have some chest pain. She states like a dull ache not constant. It comes and goes.    Per protocol patient advised to be seen today, appointment made for today.  Jamee Dsouza RN on 10/15/2024 at 10:01 AM      Reason for Disposition   Chest pain   Dizziness or lightheadedness    Additional Information   Negative: Unconscious or difficult to awaken   Negative: Acting confused (e.g., disoriented, slurred speech)   Negative: Seizure has occurred   Negative: Has fainted (passed out)   Negative: Very weak (e.g., can't stand)   Negative: Acetaminophen overdose or poisoning suspected   Negative: Sounds like a life-threatening emergency to the triager   Negative: [1] Abdominal pain AND [2] severe   Negative: [1] Constant abdominal pain AND [2] present > 2 hours   Negative: [1] Vomiting AND [2] contains red blood or black (\"coffee ground\") material   Negative: [1] Vomiting AND [2] signs of dehydration (e.g., very dry mouth, lightheaded)   Negative: Fever   Negative: Shaking chills   Negative: [1] Drinking very little AND [2] dehydration suspected (e.g., no urine > 12 hours, very dry mouth, very lightheaded)   Negative: Patient sounds very sick or weak to the triager   Negative: Abdominal pain   Negative: SEVERE difficulty breathing (e.g., struggling for each breath, speaks in single words)   Negative: Shock suspected (e.g., cold/pale/clammy skin, too weak to stand, low BP, rapid pulse)   Negative: Difficult to awaken or acting confused (e.g., disoriented, slurred speech)   Negative: Fainted, and still feels dizzy afterwards   Negative: Overdose (accidental or intentional) of medications   Negative: New neurologic deficit that is present now: * Weakness of the face, arm, or leg on " one side of the body * Numbness of the face, arm, or leg on one side of the body * Loss of speech or garbled speech   Negative: Heart beating < 50 beats per minute OR > 140 beats per minute   Negative: Sounds like a life-threatening emergency to the triager   Negative: SEVERE difficulty breathing (e.g., struggling for each breath, speaks in single words)   Negative: Difficult to awaken or acting confused (e.g., disoriented, slurred speech)   Negative: Shock suspected (e.g., cold/pale/clammy skin, too weak to stand, low BP, rapid pulse)   Negative: Passed out (i.e., lost consciousness, collapsed and was not responding)   Negative: Followed an injury to chest   Negative: Heart beating < 50 beats per minute OR > 140 beats per minute   Negative: Chest pain lasting longer than 5 minutes and ANY of the following:         Pain is crushing, pressure-like, or heavy         Over 44 years old          Over 30 years old and one cardiac risk factor (e.g diabetes, high blood pressure, high cholesterol, smoker, or family history of heart disease)         History of heart disease (e.g. angina, heart attack, heart failure, bypass surgery, takes nitroglycerin)   Negative: Visible sweat on face or sweat dripping down face   Negative: Sounds like a life-threatening emergency to the triager   Negative: SEVERE chest pain   Negative: Pain also in shoulder(s) or arm(s) or jaw   Negative: Difficulty breathing   Negative: Cocaine use within last 3 days   Negative: Major surgery in the past month   Negative: Hip or leg fracture (broken bone) in past month (or had cast on leg or ankle in past month)   Negative: Illness requiring prolonged bedrest in past month (e.g., immobilization, long hospital stay)   Negative: Long-distance travel in past month (e.g., car, bus, train, plane; with trip lasting 6 or more hours)   Negative: History of prior 'blood clot' in leg or lungs (i.e., deep vein thrombosis, pulmonary embolism)   Negative: History of  inherited increased risk of blood clots (e.g., Factor 5 Leiden, Anti-thrombin 3, Protein C or Protein S deficiency, Prothrombin mutation)   Negative: Cancer treatment in the past two months (or has cancer now)   Negative: Heart beating irregularly or very rapidly   Negative: Chest pain lasting longer than 5 minutes and occurred in last 3 days (72 hours) (Exception: Feels exactly the same as previously diagnosed heartburn and has accompanying sour taste in mouth.)   Negative: Chest pain or 'angina' comes and goes and is happening more often (increasing in frequency) or getting worse (increasing in severity) (Exception: Chest pains that last only a few seconds.)    Protocols used: [id number], Dizziness-A-OH, Chest Pain-A-OH

## 2024-10-16 LAB — VIT B12 SERPL-MCNC: 601 PG/ML (ref 232–1245)

## 2024-10-22 ENCOUNTER — OFFICE VISIT (OUTPATIENT)
Dept: FAMILY MEDICINE | Facility: OTHER | Age: 54
End: 2024-10-22
Payer: COMMERCIAL

## 2024-10-22 VITALS
SYSTOLIC BLOOD PRESSURE: 104 MMHG | DIASTOLIC BLOOD PRESSURE: 78 MMHG | BODY MASS INDEX: 32.65 KG/M2 | WEIGHT: 196 LBS | RESPIRATION RATE: 17 BRPM | TEMPERATURE: 98.1 F | OXYGEN SATURATION: 97 % | HEART RATE: 70 BPM | HEIGHT: 65 IN

## 2024-10-22 DIAGNOSIS — F43.23 ADJUSTMENT DISORDER WITH MIXED ANXIETY AND DEPRESSED MOOD: ICD-10-CM

## 2024-10-22 DIAGNOSIS — R63.4 WEIGHT LOSS: ICD-10-CM

## 2024-10-22 DIAGNOSIS — Z00.00 ROUTINE GENERAL MEDICAL EXAMINATION AT A HEALTH CARE FACILITY: Primary | ICD-10-CM

## 2024-10-22 DIAGNOSIS — C56.2 MALIGNANT NEOPLASM OF LEFT OVARY (H): ICD-10-CM

## 2024-10-22 DIAGNOSIS — Z63.79 STRESSFUL LIFE EVENT AFFECTING FAMILY: ICD-10-CM

## 2024-10-22 PROBLEM — E66.01 MORBID OBESITY (H): Status: RESOLVED | Noted: 2019-06-10 | Resolved: 2024-10-22

## 2024-10-22 PROCEDURE — 99213 OFFICE O/P EST LOW 20 MIN: CPT | Mod: 25 | Performed by: PHYSICIAN ASSISTANT

## 2024-10-22 PROCEDURE — 99396 PREV VISIT EST AGE 40-64: CPT | Performed by: PHYSICIAN ASSISTANT

## 2024-10-22 SDOH — HEALTH STABILITY: PHYSICAL HEALTH: ON AVERAGE, HOW MANY DAYS PER WEEK DO YOU ENGAGE IN MODERATE TO STRENUOUS EXERCISE (LIKE A BRISK WALK)?: 5 DAYS

## 2024-10-22 SDOH — HEALTH STABILITY: PHYSICAL HEALTH: ON AVERAGE, HOW MANY MINUTES DO YOU ENGAGE IN EXERCISE AT THIS LEVEL?: 10 MIN

## 2024-10-22 ASSESSMENT — SOCIAL DETERMINANTS OF HEALTH (SDOH): HOW OFTEN DO YOU GET TOGETHER WITH FRIENDS OR RELATIVES?: MORE THAN THREE TIMES A WEEK

## 2024-10-22 ASSESSMENT — PAIN SCALES - GENERAL: PAINLEVEL: NO PAIN (0)

## 2024-10-22 NOTE — PROGRESS NOTES
"Preventive Care Visit  [facility]  Suellen Millard PA-C, Family Medicine  Oct 22, 2024      Assessment & Plan     Routine general medical examination at a health care facility  Performed general medical examination, vaccines offered but declined. Risks of not receiving immunizations reviewed including hospitalization and death.     Stressful life event affecting family  Adjustment disorder with mixed anxiety and depressed mood  Strongly encouraged patient to consider therapy or trying an antidepressant but she continues to decline. Reviewed potential side effects, patient can follow up through [facility] if she decides she would like to try either. Reviewed plan of who she can go to if she experiences more thoughts of suicide.      Malignant neoplasm of left ovary (H)  Weight loss  Continues to experience unintentional weight loss which may be related depression but would like to perform a CT chest/abdomen/pelvis due to history of ovarian cancer. Endorses some symptoms of bloating similar to when she was diagnosed with the ovarian cancer.   - CT Chest/Abdomen/Pelvis w Contrast; Future  - CT Chest/Abdomen/Pelvis w Contrast; Future        BMI  Estimated body mass index is 33.02 kg/m  as calculated from the following:    Height as of this encounter: 1.641 m (5' 4.61\").    Weight as of this encounter: 88.9 kg (196 lb).   Weight management plan: Discussed healthy diet and exercise guidelines    Counseling  Appropriate preventive services were addressed with this patient via screening, questionnaire, or discussion as appropriate for fall prevention, nutrition, physical activity, Tobacco-use cessation, social engagement, weight loss and cognition.  Checklist reviewing preventive services available has been given to the patient.  Reviewed patient's diet, addressing concerns and/or questions.   The patient was instructed to see the dentist every 6 months.         Aquiles Menard is a 54 year old, " presenting for the following:  Wellness Visit        10/22/2024     9:02 AM   Additional Questions   Roomed by amie   Accompanied by self        Health Care Directive  Patient does not have a Health Care Directive or Living Will: Discussed advance care planning with patient; information given to patient to review.    HPI    Patient is a 54 year old female presenting for a physical. She has lost 24 lbs since her last visit in April which she attributes to lack of appetite due to ongoing divorce. She is not eating as much as usually and is no longer drinking soda. She tries to stay busy but is not exercising.   She continues to experience depression because of the divorce and does endorse some thoughts of suicide. Three weeks ago she drove to the hospital and sat outside in the parking lot for a few hours after feeling suicidal but then was able to talk with family members and drive home. She denies any current plans of suicide. She has been prescribed clonazepam and trazodone in the past for insomnia but took the trazodone once and did not like the effect, did not take the clonazepam at all and disposed of it. She is not interested in therapy or trying any other medications for depression.               10/22/2024   General Health   How would you rate your overall physical health? Good   Feel stress (tense, anxious, or unable to sleep) Very much      (!) STRESS CONCERN      10/22/2024   Nutrition   Three or more servings of calcium each day? Yes   Diet: Regular (no restrictions)   How many servings of fruit and vegetables per day? (!) 2-3   How many sweetened beverages each day? 0-1            10/22/2024   Exercise   Days per week of moderate/strenous exercise 5 days   Average minutes spent exercising at this level 10 min            10/22/2024   Social Factors   Frequency of gathering with friends or relatives More than three times a week   Worry food won't last until get money to buy more No   Food not last or not  have enough money for food? No   Do you have housing? (Housing is defined as stable permanent housing and does not include staying ouside in a car, in a tent, in an abandoned building, in an overnight shelter, or couch-surfing.) Yes   Are you worried about losing your housing? No   Lack of transportation? No   Unable to get utilities (heat,electricity)? No            10/22/2024   Fall Risk   Fallen 2 or more times in the past year? No   Trouble with walking or balance? No             10/22/2024   Dental   Dentist two times every year? (!) NO            10/22/2024   TB Screening   Were you born outside of the [place]? No              Today's PHQ-2 Score:       4/17/2024     9:52 AM   PHQ-2 ( 1999 [employer])   Q1: Little interest or pleasure in doing things 3   Q2: Feeling down, depressed or hopeless 2   PHQ-2 Score 5   Q1: Little interest or pleasure in doing things Nearly every day   Q2: Feeling down, depressed or hopeless More than half the days   PHQ-2 Score 5         10/22/2024   Substance Use   Alcohol more than 3/day or more than 7/wk No   Do you use any other substances recreationally? No        Social History     Tobacco Use    Smoking status: Never     Passive exposure: Never    Smokeless tobacco: Never    Tobacco comments:     no smokers in the household   Vaping Use    Vaping status: Never Used   Substance Use Topics    Alcohol use: Yes     Comment: rarely    Drug use: No           8/8/2023   LAST FHS-7 RESULTS   1st degree relative breast or ovarian cancer No   Any relative bilateral breast cancer No   Any male have breast cancer No   Any ONE woman have BOTH breast AND ovarian cancer No   Any woman with breast cancer before 50yrs No   2 or more relatives with breast AND/OR ovarian cancer No   2 or more relatives with breast AND/OR bowel cancer No           Mammogram Screening - Mammogram every 1-2 years updated in Health Maintenance based on mutual decision making        10/22/2024   STI Screening   New sexual  partner(s) since last STI/HIV test? No        History of abnormal Pap smear: Status post hysterectomy with removal of cervix and no history of CIN2 or greater or cervical cancer. Health Maintenance and Surgical History updated.        9/14/2011     9:30 AM 1/11/2010    12:00 AM 7/16/2008    12:00 AM   PAP / HPV   PAP (Historical) NIL  NIL  NIL      ASCVD Risk   The 10-year ASCVD risk score (Raymond BURNETT, et al., 2019) is: 1%    Values used to calculate the score:      Age: 54 years      Sex: Female      Is Non- : No      Diabetic: No      Tobacco smoker: No      Systolic Blood Pressure: 104 mmHg      Is BP treated: No      HDL Cholesterol: 57 mg/dL      Total Cholesterol: 198 mg/dL         Reviewed and updated as needed this visit by Provider                    Past Medical History:   Diagnosis Date    CKD (chronic kidney disease)     Stage 3    Gastroesophageal reflux disease     History of blood transfusion     Ovarian tumor of borderline malignancy, left     Seizure (H) 2002    corrected by surgical procedure    Solitary kidney 04/2009    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     Past Surgical History:   Procedure Laterality Date    COLONOSCOPY N/A 7/10/2019    Procedure: COLONOSCOPY;  Surgeon: Sheba Tenorio MD;  [facility]    COLONOSCOPY N/A 1/10/2024    Procedure: Colonoscopy;  Surgeon: Shine Jaeegr MD;  [facility]    CYSTOSCOPY  10/24/2011    Procedure:CYSTOSCOPY; Surgeon:NAYELY MADRIGAL; [facility]    CYSTOSCOPY N/A 5/16/2022    Procedure: cystoscopy,;  Surgeon: Ivy Soto MD;  [facility]    CYSTOSCOPY  5/16/2022    Procedure: ;  Surgeon: Ivy Soto MD;  [facility]    EXAM UNDER ANESTHESIA, FISTULOTOMY RECTUM, COMBINED N/A 4/22/2022    Procedure: Exam under anesthesia, drainage of abscess and placement of seton;  Surgeon: Pham Winn MD;  Location: [facility]    EXAM UNDER ANESTHESIA, FISTULOTOMY RECTUM, COMBINED  4/22/2022     Procedure: ;  Surgeon: Pham Winn MD;  Location: [facility] OR    HC BRAIN MAPPING, 1ST HOUR MD ATTENDANCE      ablation of seizure focus    HC REDUCTION OF LARGE BREAST      HYSTERECTOMY TOTAL ABD, [place] SALPINGO-OOPHORECTOMY, NODE DISSECTION, TUMOR DEBULKING, COMBINED Bilateral 7/15/2019    Procedure: Exploratory Laparotomy, Total Abdominal Hysterectomy, Removal Of Both Tubes And Ovaries, Omentectomy, Appendectomy, Peritoneal Biopsies;  Surgeon: Ivy Soto MD;  [facility]    HYSTERECTOMY VAGINAL  10/24/2011    Procedure:HYSTERECTOMY VAGINAL; vaginal hysterectomy and cystoscopy; Surgeon:NAYELY MADRIGAL; [facility]    HYSTEROSCOPY,ABLATION ENDOMETRIUM      VAGINECTOMY, ROBOT-ASSISTED N/A 2022    Procedure: Robotic assisted radical upper vaginectomy, pelvic mass excision,;  Surgeon: Ivy Soto MD;  [facility]    [facility] LAP,DONOR KIDNEY REMOV,LIVING  2009    Left laparoscopic donor nephrectomy.  Donating to brother. [facility] [place].     OB History    Para Term  AB Living   5 5 0 0 0 0   SAB IAB Ectopic Multiple Live Births   0 0 0 0 0      # Outcome Date GA Lbr You/2nd Weight Sex Type Anes PTL Lv   5 Para            4 Para            3 Para            2 Para            1 Para              BP Readings from Last 3 Encounters:   10/22/24 104/78   10/15/24 118/80   24 128/82    Wt Readings from Last 3 Encounters:   10/22/24 88.9 kg (196 lb)   10/15/24 88.9 kg (196 lb)   24 100 kg (220 lb 8 oz)                  Patient Active Problem List   Diagnosis    GERD (gastroesophageal reflux disease)    CARDIOVASCULAR SCREENING; LDL GOAL LESS THAN 160    Dysmenorrhea    Menorrhagia    Donor of kidney for transplant    Anemia    S/P laparoscopic hysterectomy    CKD (chronic kidney disease) stage 3, GFR 30-59 ml/min (H)    Right lower quadrant abdominal mass    S/P BSO (bilateral salpingo-oophorectomy)    Transient global amnesia    History of  seizures    Ovarian tumor of borderline malignancy, left    Bilateral carpal tunnel syndrome    Migraine with aura, not intractable, without status migrainosus     Past Surgical History:   Procedure Laterality Date    COLONOSCOPY N/A 7/10/2019    Procedure: COLONOSCOPY;  Surgeon: Sheba Tenorio MD;  [facility]    COLONOSCOPY N/A 1/10/2024    Procedure: Colonoscopy;  Surgeon: Shine Jaeger MD;  [facility]    CYSTOSCOPY  10/24/2011    Procedure:CYSTOSCOPY; Surgeon:NAYELY MADRIGAL; [facility]    CYSTOSCOPY N/A 5/16/2022    Procedure: cystoscopy,;  Surgeon: Ivy Soto MD;  [facility]    CYSTOSCOPY  5/16/2022    Procedure: ;  Surgeon: Ivy Soto MD;  [facility]    EXAM UNDER ANESTHESIA, FISTULOTOMY RECTUM, COMBINED N/A 4/22/2022    Procedure: Exam under anesthesia, drainage of abscess and placement of seton;  Surgeon: Pham Winn MD;  [facility]    EXAM UNDER ANESTHESIA, FISTULOTOMY RECTUM, COMBINED  4/22/2022    Procedure: ;  Surgeon: Pham Winn MD;  [facility]    HC BRAIN MAPPING, 1ST HOUR MD ATTENDANCE  2001    ablation of seizure focus    HC REDUCTION OF LARGE BREAST  2001    HYSTERECTOMY TOTAL ABD, [place] SALPINGO-OOPHORECTOMY, NODE DISSECTION, TUMOR DEBULKING, COMBINED Bilateral 7/15/2019    Procedure: Exploratory Laparotomy, Total Abdominal Hysterectomy, Removal Of Both Tubes And Ovaries, Omentectomy, Appendectomy, Peritoneal Biopsies;  Surgeon: Ivy Soto MD;  [facility]    HYSTERECTOMY VAGINAL  10/24/2011    Procedure:HYSTERECTOMY VAGINAL; vaginal hysterectomy and cystoscopy; Surgeon:NAYELY MADRIGAL; [facility]    HYSTEROSCOPY,ABLATION ENDOMETRIUM  2001    VAGINECTOMY, ROBOT-ASSISTED N/A 5/16/2022    Procedure: Robotic assisted radical upper vaginectomy, pelvic mass excision,;  Surgeon: Ivy Soto MD;  [facility]    [facility] LAP,DONOR KIDNEY REMOV,LIVING  4/2/2009    Left laparoscopic donor nephrectomy.   "Donating to brother. [facility].       Social History     Tobacco Use    Smoking status: Never     Passive exposure: Never    Smokeless tobacco: Never    Tobacco comments:     no smokers in the household   Substance Use Topics    Alcohol use: Yes     Comment: rarely     Family History   Problem Relation Age of Onset    Cancer Paternal Grandfather         colon cancer    Diabetes Maternal Grandmother     Cerebrovascular Disease Maternal Grandmother     Arthritis Maternal Grandmother     Arthritis Mother     Cardiovascular Maternal Grandfather         heart attack x2    Heart Disease Maternal Grandfather         heart attack x2    Gastrointestinal Disease Paternal Grandmother         liver failure    Genitourinary Problems Brother         kidney failure    Cancer Father         lung cancer diagnosed 7/11 due to chemical exposure war    Cancer Maternal Aunt     Asthma No family hx of     C.A.D. No family hx of     Hypertension No family hx of     Breast Cancer No family hx of     Cancer - colorectal No family hx of     Prostate Cancer No family hx of     Alzheimer Disease No family hx of     Blood Disease No family hx of     Circulatory No family hx of     Eye Disorder No family hx of     Lipids No family hx of     Musculoskeletal Disorder No family hx of     Neurologic Disorder No family hx of     Respiratory No family hx of     Thyroid Disease No family hx of          No current outpatient medications on file.     Allergies   Allergen Reactions    Aspirin Rash     Rash on face         Review of Systems  Constitutional, HEENT, cardiovascular, pulmonary, GI, , musculoskeletal, neuro, skin, endocrine and psych systems are negative, except as otherwise noted.     Objective    Exam  /78   Pulse 70   Temp 98.1  F (36.7  C) (Temporal)   Resp 17   Ht 1.641 m (5' 4.61\")   Wt 88.9 kg (196 lb)   LMP 09/19/2011   SpO2 97%   BMI 33.02 kg/m     Estimated body mass index is 33.02 kg/m  as calculated from the " "following:    Height as of this encounter: 1.641 m (5' 4.61\").    Weight as of this encounter: 88.9 kg (196 lb).    Physical Exam  GENERAL: alert and no distress  EYES: Eyes grossly normal to inspection, PERRL and conjunctivae and sclerae normal  HENT: ear canals and TM's normal, nose and mouth without ulcers or lesions  NECK: no adenopathy, no asymmetry, masses, or scars  RESP: lungs clear to auscultation - no rales, rhonchi or wheezes  CV: regular rate and rhythm, normal S1 S2, no S3 or S4, no murmur, click or rub, no peripheral edema  ABDOMEN: soft, nontender, no hepatosplenomegaly, no masses and bowel sounds normal  MS: no gross musculoskeletal defects noted, no edema  SKIN: no suspicious lesions or rashes  NEURO: Normal strength and tone, mentation intact and speech normal  PSYCH: mentation appears normal        Signed Electronically by: SIOMARA Esqueda Emalie A Scott PA-C, was present with the Physician Assistant student who participated in the service and in the documentation of the note.  I have verified the history and personally performed the physical exam and medical decision making.  I agree with the assessment and plan of care as documented in the note.     Signed Electronically by COLEEN Trevizo    "

## 2024-10-22 NOTE — PATIENT INSTRUCTIONS
Patient Education   Preventive Care Advice   This is general advice given by our system to help you stay healthy. However, your care team may have specific advice just for you. Please talk to your care team about your preventive care needs.  Nutrition  Eat 5 or more servings of fruits and vegetables each day.  Try wheat bread, brown rice and whole grain pasta (instead of white bread, rice, and pasta).  Get enough calcium and vitamin D. Check the label on foods and aim for 100% of the RDA (recommended daily allowance).  Lifestyle  Exercise at least 150 minutes each week  (30 minutes a day, 5 days a week).  Do muscle strengthening activities 2 days a week. These help control your weight and prevent disease.  No smoking.  Wear sunscreen to prevent skin cancer.  Have a dental exam and cleaning every 6 months.  Yearly exams  See your health care team every year to talk about:  Any changes in your health.  Any medicines your care team has prescribed.  Preventive care, family planning, and ways to prevent chronic diseases.  Shots (vaccines)   HPV shots (up to age 26), if you've never had them before.  Hepatitis B shots (up to age 59), if you've never had them before.  COVID-19 shot: Get this shot when it's due.  Flu shot: Get a flu shot every year.  Tetanus shot: Get a tetanus shot every 10 years.  Pneumococcal, hepatitis A, and RSV shots: Ask your care team if you need these based on your risk.  Shingles shot (for age 50 and up)  General health tests  Diabetes screening:  Starting at age 35, Get screened for diabetes at least every 3 years.  If you are younger than age 35, ask your care team if you should be screened for diabetes.  Cholesterol test: At age 39, start having a cholesterol test every 5 years, or more often if advised.  Bone density scan (DEXA): At age 50, ask your care team if you should have this scan for osteoporosis (brittle bones).  Hepatitis C: Get tested at least once in your life.  STIs (sexually  transmitted infections)  Before age 24: Ask your care team if you should be screened for STIs.  After age 24: Get screened for STIs if you're at risk. You are at risk for STIs (including HIV) if:  You are sexually active with more than one person.  You don't use condoms every time.  You or a partner was diagnosed with a sexually transmitted infection.  If you are at risk for HIV, ask about PrEP medicine to prevent HIV.  Get tested for HIV at least once in your life, whether you are at risk for HIV or not.  Cancer screening tests  Cervical cancer screening: If you have a cervix, begin getting regular cervical cancer screening tests starting at age 21.  Breast cancer scan (mammogram): If you've ever had breasts, begin having regular mammograms starting at age 40. This is a scan to check for breast cancer.  Colon cancer screening: It is important to start screening for colon cancer at age 45.  Have a colonoscopy test every 10 years (or more often if you're at risk) Or, ask your provider about stool tests like a FIT test every year or Cologuard test every 3 years.  To learn more about your testing options, visit:   .  For help making a decision, visit:   https://bit.ly/wo68919.  Prostate cancer screening test: If you have a prostate, ask your care team if a prostate cancer screening test (PSA) at age 55 is right for you.  Lung cancer screening: If you are a current or former smoker ages 50 to 80, ask your care team if ongoing lung cancer screenings are right for you.  For informational purposes only. Not to replace the advice of your health care provider. Copyright   2023 [employer]. All rights reserved. Clinically reviewed by the [facility]. [employer] [id number] - REV 01/24.

## 2024-10-31 ENCOUNTER — HOSPITAL ENCOUNTER (OUTPATIENT)
Dept: CT IMAGING | Facility: CLINIC | Age: 54
Discharge: HOME | End: 2024-10-31
Attending: PHYSICIAN ASSISTANT | Admitting: PHYSICIAN ASSISTANT
Payer: COMMERCIAL

## 2024-10-31 DIAGNOSIS — C56.2 MALIGNANT NEOPLASM OF LEFT OVARY (H): ICD-10-CM

## 2024-10-31 DIAGNOSIS — R63.4 WEIGHT LOSS: ICD-10-CM

## 2024-10-31 PROCEDURE — 250N000009 HC RX 250: Performed by: PHYSICIAN ASSISTANT

## 2024-10-31 PROCEDURE — 250N000011 HC RX IP 250 OP 636: Performed by: PHYSICIAN ASSISTANT

## 2024-10-31 PROCEDURE — 74177 CT ABD & PELVIS W/CONTRAST: CPT

## 2024-10-31 RX ORDER — IOPAMIDOL 755 MG/ML
500 INJECTION, SOLUTION INTRAVASCULAR ONCE
Status: COMPLETED | OUTPATIENT
Start: 2024-10-31 | End: 2024-10-31

## 2024-10-31 RX ADMIN — IOPAMIDOL 96 ML: 755 INJECTION, SOLUTION INTRAVENOUS at 09:45

## 2024-10-31 RX ADMIN — SODIUM CHLORIDE 60 ML: 9 INJECTION, SOLUTION INTRAVENOUS at 09:45

## 2024-11-01 ENCOUNTER — TELEPHONE (OUTPATIENT)
Dept: FAMILY MEDICINE | Facility: OTHER | Age: 54
End: 2024-11-01
Payer: COMMERCIAL

## 2024-11-01 NOTE — TELEPHONE ENCOUNTER
----- Message from Suellen Millard sent at 11/1/2024  6:16 AM CDT -----  Please call patient. Her CT abdomen/pelvis/chest looks reassuring, everything is stable, no new findings.     Suellen Millard PA-C

## 2024-11-25 ENCOUNTER — HOSPITAL ENCOUNTER (OUTPATIENT)
Dept: MAMMOGRAPHY | Facility: CLINIC | Age: 54
Discharge: HOME | End: 2024-11-25
Attending: PHYSICIAN ASSISTANT | Admitting: PHYSICIAN ASSISTANT
Payer: COMMERCIAL

## 2024-11-25 DIAGNOSIS — Z12.31 VISIT FOR SCREENING MAMMOGRAM: ICD-10-CM

## 2024-11-25 PROCEDURE — 77063 BREAST TOMOSYNTHESIS BI: CPT

## 2024-11-25 PROCEDURE — 77067 SCR MAMMO BI INCL CAD: CPT

## 2025-01-02 ENCOUNTER — OFFICE VISIT (OUTPATIENT)
Dept: FAMILY MEDICINE | Facility: OTHER | Age: 55
End: 2025-01-02
Payer: COMMERCIAL

## 2025-01-02 ENCOUNTER — TELEPHONE (OUTPATIENT)
Dept: FAMILY MEDICINE | Facility: OTHER | Age: 55
End: 2025-01-02

## 2025-01-02 VITALS
HEIGHT: 65 IN | SYSTOLIC BLOOD PRESSURE: 112 MMHG | HEART RATE: 91 BPM | WEIGHT: 199.5 LBS | OXYGEN SATURATION: 99 % | BODY MASS INDEX: 33.24 KG/M2 | TEMPERATURE: 97.4 F | DIASTOLIC BLOOD PRESSURE: 80 MMHG

## 2025-01-02 DIAGNOSIS — Z23 NEED FOR TETANUS BOOSTER: ICD-10-CM

## 2025-01-02 DIAGNOSIS — L03.115 CELLULITIS OF RIGHT LOWER EXTREMITY: Primary | ICD-10-CM

## 2025-01-02 DIAGNOSIS — S81.801A OPEN WOUND OF RIGHT LOWER EXTREMITY, INITIAL ENCOUNTER: ICD-10-CM

## 2025-01-02 LAB
ERYTHROCYTE [DISTWIDTH] IN BLOOD BY AUTOMATED COUNT: 12.7 % (ref 10–15)
HCT VFR BLD AUTO: 40.2 % (ref 35–47)
HGB BLD-MCNC: 13.2 G/DL (ref 11.7–15.7)
MCH RBC QN AUTO: 29.5 PG (ref 26.5–33)
MCHC RBC AUTO-ENTMCNC: 32.8 G/DL (ref 31.5–36.5)
MCV RBC AUTO: 90 FL (ref 78–100)
PLATELET # BLD AUTO: 321 10E3/UL (ref 150–450)
RBC # BLD AUTO: 4.47 10E6/UL (ref 3.8–5.2)
WBC # BLD AUTO: 7.8 10E3/UL (ref 4–11)

## 2025-01-02 RX ORDER — DOXYCYCLINE 100 MG/1
100 CAPSULE ORAL 2 TIMES DAILY
Qty: 14 CAPSULE | Refills: 0 | Status: SHIPPED | OUTPATIENT
Start: 2025-01-02 | End: 2025-01-09

## 2025-01-02 ASSESSMENT — PAIN SCALES - GENERAL: PAINLEVEL_OUTOF10: NO PAIN (0)

## 2025-01-02 NOTE — TELEPHONE ENCOUNTER
Reason for Call:  Appointment Request    Patient requesting this type of appt:  Office Visit    Requested provider: Suellen Millard    Reason patient unable to be scheduled: Not within requested timeframe    When does patient want to be seen/preferred time: 1-2 days    Comments: Pt states that about 3 weeks ago, she went outside to  a tree branch that fell and her leg hit an exposed pipe in the ground and took a gouge out of her leg and it isn't getting better. Pt looking to be seen today or tomorrow with Suellen or anyone.     Okay to leave a detailed message?: Yes at Cell number on file:    Telephone Information:   Mobile [phone]       Call taken on 1/2/2025 at 11:30 AM by Ankita Rojas

## 2025-01-02 NOTE — PATIENT INSTRUCTIONS
The redness around the scab on your right lower leg is consistent with cellulitis, a superficial skin infection. I have prescribed an antibiotic called doxycycline. Please take one pill twice per day for the next 7 days. Take with food to prevent stomach upset. I would recommend eating yogurt/cottage cheese while taking to prevent diarrhea. We will check your cell counts today given duration of wound.     Today we updated your tetanus. Your arm will be sore over the next few days.

## 2025-01-02 NOTE — PROGRESS NOTES
Assessment & Plan     Cellulitis of right lower extremity  Patient is a 54 year-old female presenting with concerns of erythema and pain around right lower extremity wound that has been present for nearly 3 weeks. Consistent with cellulitis. Prescribed 7 day course of doxycycline for MRSA coverage given open wound from outdoor source and duration of symptoms. Plan to check CBC today to ensure no leukocytosis present. Discussed signs and symptoms warranting emergency medical attention.Discussed proper use of medication(s) and potential side effects. Follow-up if symptoms fail to improve despite above interventions. Patient understands and is agreeable to plan as discussed in clinic.  - doxycycline hyclate (VIBRAMYCIN) 100 MG capsule; Take 1 capsule (100 mg) by mouth 2 times daily for 7 days.  - CBC with platelets; Future    Open wound of right lower extremity, initial encounter  Wound is now scabbed over. Updated TDAP during encounter as she is overdue and given wound from metal ground post.   - TDAP 7+ (ADACEL,BOOSTRIX)    Need for tetanus booster  Updated during encounter.   - TDAP 7+ (ADACEL,BOOSTRIX)        Subjective   Sailaja is a 54 year old, presenting for the following health issues:  Wound is not healing       1/2/2025     4:39 PM   Additional Questions   Roomed by juan carlos MARTINEZ   Accompanied by Self     History of Present Illness       Reason for visit:  Sore on my lag  Symptom onset:  3-4 weeks ago  Symptoms include:  None  Symptom intensity:  Mild  Symptom progression:  Staying the same  Had these symptoms before:  No  What makes it worse:  No  What makes it better:  No   She is taking medications regularly.     Presents with concerns of right lower extremity wound with surrounding redness and pain. Reports tripping over a post in the ground outside roughly 3 weeks ago. Post is metal and she did not see it. Has been treating with topical antibiotic ointment and hydrogen peroxide. Redness and pain continue to  "persist. Denies discharge from area.     Denies fever, chills, or other systemic symptoms.       Objective    /80   Pulse 91   Temp 97.4  F (36.3  C) (Temporal)   Ht 1.641 m (5' 4.61\")   Wt 90.5 kg (199 lb 8 oz)   LMP 09/19/2011   SpO2 99%   BMI 33.60 kg/m    Body mass index is 33.6 kg/m .  Physical Exam  Vitals reviewed.   Constitutional:       General: She is not in acute distress.     Appearance: Normal appearance. She is not ill-appearing.   Cardiovascular:      Rate and Rhythm: Normal rate and regular rhythm.      Heart sounds: Normal heart sounds, S1 normal and S2 normal. No murmur heard.  Pulmonary:      Effort: Pulmonary effort is normal. No respiratory distress.      Breath sounds: Normal breath sounds.   Skin:     General: Skin is warm.      Capillary Refill: Capillary refill takes less than 2 seconds.      Findings: Wound (Scabbed right lower extremity wound with surrounding cellulitis. No discharge or induration.) present.   Neurological:      Mental Status: She is alert.   Psychiatric:         Attention and Perception: Attention and perception normal.         Mood and Affect: Mood and affect normal.           Media Information    Document Information    Other: Photograph   Right lower extremity   01/02/2025 5:07 PM   Attached To:   Office Visit on 1/2/25 with Yani Phelps APRN CNP   Source Information    Yani Phelps APRN CNP  [facility]   Document History      Labs pending        Signed Electronically by: EREN Reyes CNP    "

## (undated) DEVICE — PAD PERI INDIV WRAP 11" 2022A

## (undated) DEVICE — LINEN TOWEL PACK X30 5481

## (undated) DEVICE — ESU LIGASURE IMPACT OPEN SEALER/DVDR CVD LG JAW LF4418

## (undated) DEVICE — SU MONOCRYL 4-0 PS-2 27" UND Y426H

## (undated) DEVICE — PREP CHLORAPREP 26ML TINTED ORANGE  260815

## (undated) DEVICE — DAVINCI XI SEAL UNIVERSAL 5-8MM 470361

## (undated) DEVICE — KIT PATIENT POSITIONING PIGAZZI LATEX FREE 40580

## (undated) DEVICE — TUBING SUCTION 12"X1/4" N612

## (undated) DEVICE — ESU GROUND PAD ADULT REM W/15' CORD E7507DB

## (undated) DEVICE — SYR BULB IRRIG 50ML LATEX FREE 0035280

## (undated) DEVICE — TUBING IRRIG CYSTO/BLADDER SET 81" LF 2C4040

## (undated) DEVICE — NDL INSUFFLATION 13GA 150MM C2202

## (undated) DEVICE — PANTIES MESH LG/XLG 2PK 706M2

## (undated) DEVICE — TAPE CLOTH ADHESIVE 3" LATEX FREE

## (undated) DEVICE — LINEN TOWEL PACK X10 5473

## (undated) DEVICE — LINEN FULL SHEET 5511

## (undated) DEVICE — Device

## (undated) DEVICE — WIPES FOLEY CARE SURESTEP PROVON DFC100

## (undated) DEVICE — DAVINCI HOT SHEARS TIP COVER  400180

## (undated) DEVICE — SOL WATER IRRIG 1000ML BOTTLE 2F7114

## (undated) DEVICE — SUCTION CANISTER MEDIVAC LINER 3000ML W/LID 65651-530

## (undated) DEVICE — DRSG GAUZE 2X2" 8042

## (undated) DEVICE — VESSEL LOOPS RED MINI 31145710

## (undated) DEVICE — PREP POVIDONE IODINE SOLUTION 10% 4OZ BOTTLE 29906-004

## (undated) DEVICE — DAVINCI XI MONOPOLAR SCISSORS HOT SHEARS 8MM 470179

## (undated) DEVICE — DAVINCI XI ESU FCP BIPOLAR MARYLAND 470172

## (undated) DEVICE — SUCTION IRR STRYKERFLOW II W/TIP 250-070-520

## (undated) DEVICE — SPONGE LAP 18X18" X8435

## (undated) DEVICE — DRAPE LEGGINGS CLEAR 8430

## (undated) DEVICE — GLOVE PROTEXIS W/NEU-THERA 6.5  2D73TE65

## (undated) DEVICE — SU VICRYL 0 TIE 54" J608H

## (undated) DEVICE — BLADE KNIFE SURG 15 371115

## (undated) DEVICE — KOH COLPOTOMIZER OCCLUDER  CPO-6

## (undated) DEVICE — SURGICEL ABSORBABLE HEMOSTAT SNOW 4"X4" 2083

## (undated) DEVICE — LINEN TOWEL PACK X6 WHITE 5487

## (undated) DEVICE — DAVINCI XI GRASPER PROGRASP 8MM EXT 471093

## (undated) DEVICE — DAVINCI XI OBTURATOR BLADELESS 8MM 470359

## (undated) DEVICE — GLOVE PROTEXIS W/NEU-THERA 7.0  2D73TE70

## (undated) DEVICE — PACK AB HYST II

## (undated) DEVICE — JELLY LUBRICATING SURGILUBE 2OZ TUBE

## (undated) DEVICE — TUBING FILTER TRI-LUMEN AIRSEAL ASC-EVAC1

## (undated) DEVICE — SU VICRYL 4-0 RB-1 27" UND J214H

## (undated) DEVICE — BLADE CLIPPER SGL USE 9680

## (undated) DEVICE — SU WND CLOSURE VLOC 180 ABS 0 9" GS-21 VLOCL0346

## (undated) DEVICE — SOL NACL 0.9% INJ 1000ML BAG 2B1324X

## (undated) DEVICE — SUCTION MANIFOLD NEPTUNE 2 SYS 4 PORT 0702-020-000

## (undated) DEVICE — LINEN HALF SHEET 5512

## (undated) DEVICE — KIT ENDO TURNOVER/PROCEDURE CARRY-ON 101822

## (undated) DEVICE — SU VICRYL 0 CT-2 27" J334H

## (undated) DEVICE — KIT ENDO FIRST STEP DISINFECTANT 200ML W/POUCH EP-4

## (undated) DEVICE — SYSTEM LAPAROVUE VISIBILITY LAPVUE10

## (undated) DEVICE — SU VICRYL 3-0 SH 27" UND J416H

## (undated) DEVICE — DAVINCI XI DRIVER NDL MEGA SUTURECUT 8MM EXT 471309

## (undated) DEVICE — DAVINCI XI DRAPE COLUMN 470341

## (undated) DEVICE — SU SILK 3-0 SH 30" K832H

## (undated) DEVICE — ESU GROUND PAD ADULT W/CORD E7507

## (undated) DEVICE — STPL LINEAR CUT 55MM TLC55

## (undated) DEVICE — SU PDS II 0 TP-1 60" Z991G

## (undated) DEVICE — CATH TRAY FOLEY SURESTEP 16FR W/URNE MTR STLK LATEX A303316A

## (undated) DEVICE — CLIP HORIZON LG ORANGE 004200

## (undated) DEVICE — ENDO POUCH UNIVERSAL RETRIEVAL SYSTEM INZII 5MM CD003

## (undated) DEVICE — ADH SKIN CLOSURE PREMIERPRO EXOFIN 1.0ML 3470

## (undated) DEVICE — PREP CHLORAPREP 26ML TINTED HI-LITE ORANGE 930815

## (undated) DEVICE — PREP TECHNI-CARE CHLOROXYLENOL 3% 4OZ BOTTLE C222-4ZWO

## (undated) DEVICE — PREP SCRUB SOL EXIDINE 4% CHG 4OZ 29002-404

## (undated) DEVICE — DRAPE SHEET REV FOLD 3/4 9349

## (undated) DEVICE — PROTECTOR ARM ONE-STEP TRENDELENBURG 40418

## (undated) DEVICE — SU SILK 0 24" TIE SA76G

## (undated) DEVICE — PACK GOWN 3/PK DISP XL SBA32GPFCB

## (undated) DEVICE — SOL NACL 0.9% IRRIG 1000ML BOTTLE 2F7124

## (undated) DEVICE — SYR 30ML LL W/O NDL 302832

## (undated) DEVICE — PREP SKIN SCRUB TRAY 4461A

## (undated) DEVICE — PACK MINOR CUSTOM RIDGES SBA32RMRMA

## (undated) DEVICE — GOWN IMPERVIOUS 2XL BLUE

## (undated) DEVICE — TUBING SUCTION 6"X3/16" N56A

## (undated) DEVICE — DAVINCI XI DRAPE ARM 470015

## (undated) DEVICE — DRSG TELFA 3X8" 1238

## (undated) DEVICE — STPL RELOAD LINEAR CUT 55MM TCR55

## (undated) DEVICE — DRSG TEGADERM 2 3/8X2 3/4" 1624W

## (undated) DEVICE — SUCTION MANIFOLD NEPTUNE 2 SYS 1 PORT 702-025-000

## (undated) DEVICE — SU VICRYL 4-0 PS-2 18" UND J496H

## (undated) DEVICE — DRAPE LAP PEDS DISP 29492

## (undated) DEVICE — BAG CLEAR TRASH 1.3M 39X33" P4040C

## (undated) DEVICE — DAVINCI XI ESU BIPOLAR 3MM ENDOWRIST FENESTRATED EXT 471205

## (undated) RX ORDER — HYDROMORPHONE HYDROCHLORIDE 1 MG/ML
INJECTION, SOLUTION INTRAMUSCULAR; INTRAVENOUS; SUBCUTANEOUS
Status: DISPENSED
Start: 2022-05-16

## (undated) RX ORDER — FENTANYL CITRATE 50 UG/ML
INJECTION, SOLUTION INTRAMUSCULAR; INTRAVENOUS
Status: DISPENSED
Start: 2019-07-15

## (undated) RX ORDER — SODIUM CHLORIDE 9 MG/ML
INJECTION, SOLUTION INTRAVENOUS
Status: DISPENSED
Start: 2019-07-15

## (undated) RX ORDER — FENTANYL CITRATE 50 UG/ML
INJECTION, SOLUTION INTRAMUSCULAR; INTRAVENOUS
Status: DISPENSED
Start: 2019-07-10

## (undated) RX ORDER — CEFAZOLIN SODIUM/WATER 2 G/20 ML
SYRINGE (ML) INTRAVENOUS
Status: DISPENSED
Start: 2022-05-16

## (undated) RX ORDER — FENTANYL CITRATE 50 UG/ML
INJECTION, SOLUTION INTRAMUSCULAR; INTRAVENOUS
Status: DISPENSED
Start: 2022-05-16

## (undated) RX ORDER — APREPITANT 40 MG/1
CAPSULE ORAL
Status: DISPENSED
Start: 2022-05-16

## (undated) RX ORDER — LIDOCAINE HYDROCHLORIDE 10 MG/ML
INJECTION, SOLUTION EPIDURAL; INFILTRATION; INTRACAUDAL; PERINEURAL
Status: DISPENSED
Start: 2022-04-22

## (undated) RX ORDER — ONDANSETRON 2 MG/ML
INJECTION INTRAMUSCULAR; INTRAVENOUS
Status: DISPENSED
Start: 2022-05-16

## (undated) RX ORDER — ALBUTEROL SULFATE 90 UG/1
AEROSOL, METERED RESPIRATORY (INHALATION)
Status: DISPENSED
Start: 2019-07-15

## (undated) RX ORDER — DIPHENHYDRAMINE HYDROCHLORIDE 50 MG/ML
INJECTION INTRAMUSCULAR; INTRAVENOUS
Status: DISPENSED
Start: 2019-07-10

## (undated) RX ORDER — ACETAMINOPHEN 325 MG/1
TABLET ORAL
Status: DISPENSED
Start: 2022-05-16

## (undated) RX ORDER — LIDOCAINE HYDROCHLORIDE 20 MG/ML
INJECTION, SOLUTION EPIDURAL; INFILTRATION; INTRACAUDAL; PERINEURAL
Status: DISPENSED
Start: 2019-07-15

## (undated) RX ORDER — DEXAMETHASONE SODIUM PHOSPHATE 4 MG/ML
INJECTION, SOLUTION INTRA-ARTICULAR; INTRALESIONAL; INTRAMUSCULAR; INTRAVENOUS; SOFT TISSUE
Status: DISPENSED
Start: 2019-07-15

## (undated) RX ORDER — OXYCODONE HYDROCHLORIDE 5 MG/1
TABLET ORAL
Status: DISPENSED
Start: 2022-05-16

## (undated) RX ORDER — PROPOFOL 10 MG/ML
INJECTION, EMULSION INTRAVENOUS
Status: DISPENSED
Start: 2019-07-15

## (undated) RX ORDER — SODIUM CHLORIDE 9 MG/ML
INJECTION, SOLUTION INTRAVENOUS
Status: DISPENSED
Start: 2022-03-31

## (undated) RX ORDER — PROPOFOL 10 MG/ML
INJECTION, EMULSION INTRAVENOUS
Status: DISPENSED
Start: 2022-04-22

## (undated) RX ORDER — CEFAZOLIN SODIUM 1 G/3ML
INJECTION, POWDER, FOR SOLUTION INTRAMUSCULAR; INTRAVENOUS
Status: DISPENSED
Start: 2019-07-15

## (undated) RX ORDER — EPHEDRINE SULFATE 50 MG/ML
INJECTION, SOLUTION INTRAMUSCULAR; INTRAVENOUS; SUBCUTANEOUS
Status: DISPENSED
Start: 2019-07-15

## (undated) RX ORDER — ONDANSETRON 2 MG/ML
INJECTION INTRAMUSCULAR; INTRAVENOUS
Status: DISPENSED
Start: 2022-04-22

## (undated) RX ORDER — ALBUMIN, HUMAN INJ 5% 5 %
SOLUTION INTRAVENOUS
Status: DISPENSED
Start: 2019-07-15

## (undated) RX ORDER — SCOLOPAMINE TRANSDERMAL SYSTEM 1 MG/1
PATCH, EXTENDED RELEASE TRANSDERMAL
Status: DISPENSED
Start: 2019-07-15

## (undated) RX ORDER — CEFAZOLIN SODIUM 2 G/100ML
INJECTION, SOLUTION INTRAVENOUS
Status: DISPENSED
Start: 2019-07-15

## (undated) RX ORDER — ROCURONIUM BROMIDE 50 MG/5 ML
SYRINGE (ML) INTRAVENOUS
Status: DISPENSED
Start: 2022-05-16

## (undated) RX ORDER — PHENYLEPHRINE HCL IN 0.9% NACL 1 MG/10 ML
SYRINGE (ML) INTRAVENOUS
Status: DISPENSED
Start: 2019-07-15

## (undated) RX ORDER — HYDROMORPHONE HYDROCHLORIDE 1 MG/ML
INJECTION, SOLUTION INTRAMUSCULAR; INTRAVENOUS; SUBCUTANEOUS
Status: DISPENSED
Start: 2019-07-15

## (undated) RX ORDER — LIDOCAINE HYDROCHLORIDE 10 MG/ML
INJECTION, SOLUTION EPIDURAL; INFILTRATION; INTRACAUDAL; PERINEURAL
Status: DISPENSED
Start: 2022-03-31

## (undated) RX ORDER — BUPIVACAINE HYDROCHLORIDE AND EPINEPHRINE 2.5; 5 MG/ML; UG/ML
INJECTION, SOLUTION EPIDURAL; INFILTRATION; INTRACAUDAL; PERINEURAL
Status: DISPENSED
Start: 2022-04-22

## (undated) RX ORDER — ONDANSETRON 2 MG/ML
INJECTION INTRAMUSCULAR; INTRAVENOUS
Status: DISPENSED
Start: 2019-07-15

## (undated) RX ORDER — FENTANYL CITRATE 50 UG/ML
INJECTION, SOLUTION INTRAMUSCULAR; INTRAVENOUS
Status: DISPENSED
Start: 2022-03-31

## (undated) RX ORDER — LABETALOL 20 MG/4 ML (5 MG/ML) INTRAVENOUS SYRINGE
Status: DISPENSED
Start: 2022-04-22

## (undated) RX ORDER — FENTANYL CITRATE 50 UG/ML
INJECTION, SOLUTION INTRAMUSCULAR; INTRAVENOUS
Status: DISPENSED
Start: 2022-04-22

## (undated) RX ORDER — ACETAMINOPHEN 325 MG/1
TABLET ORAL
Status: DISPENSED
Start: 2022-04-22